# Patient Record
Sex: MALE | Race: WHITE | NOT HISPANIC OR LATINO | Employment: OTHER | URBAN - METROPOLITAN AREA
[De-identification: names, ages, dates, MRNs, and addresses within clinical notes are randomized per-mention and may not be internally consistent; named-entity substitution may affect disease eponyms.]

---

## 2017-07-03 ENCOUNTER — AMBULATORY - RIVER FALLS (OUTPATIENT)
Dept: FAMILY MEDICINE | Facility: CLINIC | Age: 67
End: 2017-07-03
Payer: COMMERCIAL

## 2017-07-04 LAB — CREAT SERPL-MCNC: 1.14 MG/DL (ref 0.7–1.25)

## 2017-09-27 ENCOUNTER — COMMUNICATION - HEALTHEAST (OUTPATIENT)
Dept: CARDIOLOGY | Facility: TELEHEALTH | Age: 67
End: 2017-09-27

## 2017-09-27 DIAGNOSIS — I10 ESSENTIAL HYPERTENSION WITH GOAL BLOOD PRESSURE LESS THAN 140/90: ICD-10-CM

## 2017-10-05 ENCOUNTER — AMBULATORY - RIVER FALLS (OUTPATIENT)
Dept: FAMILY MEDICINE | Facility: CLINIC | Age: 67
End: 2017-10-05
Payer: COMMERCIAL

## 2017-10-06 LAB
CREAT SERPL-MCNC: 1.18 MG/DL (ref 0.7–1.25)
GLUCOSE BLD-MCNC: 62 MG/DL (ref 65–99)
PSA SERPL-MCNC: 1.2 NG/ML

## 2017-10-11 ENCOUNTER — OFFICE VISIT - RIVER FALLS (OUTPATIENT)
Dept: FAMILY MEDICINE | Facility: CLINIC | Age: 67
End: 2017-10-11
Payer: COMMERCIAL

## 2017-10-11 ASSESSMENT — MIFFLIN-ST. JEOR: SCORE: 1440.76

## 2017-12-07 ENCOUNTER — OFFICE VISIT - RIVER FALLS (OUTPATIENT)
Dept: FAMILY MEDICINE | Facility: CLINIC | Age: 67
End: 2017-12-07
Payer: COMMERCIAL

## 2017-12-07 ASSESSMENT — MIFFLIN-ST. JEOR: SCORE: 1444.39

## 2017-12-14 ENCOUNTER — AMBULATORY - HEALTHEAST (OUTPATIENT)
Dept: CARDIOLOGY | Facility: CLINIC | Age: 67
End: 2017-12-14

## 2017-12-14 ENCOUNTER — RECORDS - HEALTHEAST (OUTPATIENT)
Dept: ADMINISTRATIVE | Facility: OTHER | Age: 67
End: 2017-12-14

## 2017-12-15 ENCOUNTER — OFFICE VISIT - HEALTHEAST (OUTPATIENT)
Dept: CARDIOLOGY | Facility: CLINIC | Age: 67
End: 2017-12-15

## 2017-12-15 DIAGNOSIS — E78.5 HYPERLIPIDEMIA: ICD-10-CM

## 2017-12-15 DIAGNOSIS — R07.2 PRECORDIAL PAIN: ICD-10-CM

## 2017-12-15 DIAGNOSIS — I10 ESSENTIAL HYPERTENSION: ICD-10-CM

## 2017-12-15 ASSESSMENT — MIFFLIN-ST. JEOR: SCORE: 1448.24

## 2017-12-18 ENCOUNTER — HOSPITAL ENCOUNTER (OUTPATIENT)
Dept: CARDIOLOGY | Facility: HOSPITAL | Age: 67
Discharge: HOME | End: 2017-12-18
Attending: INTERNAL MEDICINE

## 2017-12-18 DIAGNOSIS — R07.2 PRECORDIAL PAIN: ICD-10-CM

## 2017-12-18 DIAGNOSIS — E78.5 HYPERLIPIDEMIA: ICD-10-CM

## 2017-12-18 LAB
CV STRESS CURRENT BP HE: NORMAL
CV STRESS DEVIATION TIME HE: NORMAL
CV STRESS ECHO PERCENT HR HE: NORMAL
CV STRESS EXERCISE STAGE HE: NORMAL
CV STRESS FINAL RESTING BP HE: NORMAL
CV STRESS FINAL RESTING HR HE: 70
CV STRESS MAX HR HE: 133
CV STRESS MAX TREADMILL GRADE HE: 18
CV STRESS MAX TREADMILL SPEED HE: 5
CV STRESS PEAK DIA BP HE: NORMAL
CV STRESS PEAK SYS BP HE: NORMAL
CV STRESS PHASE HE: NORMAL
CV STRESS PROTOCOL HE: NORMAL
CV STRESS RESTING PT POSITION HE: NORMAL
CV STRESS RESTING PT POSITION HE: NORMAL
CV STRESS ST DEVIATION AMOUNT HE: NORMAL
CV STRESS ST DEVIATION ELEVATION HE: NORMAL
CV STRESS ST EVELATION AMOUNT HE: NORMAL
CV STRESS TEST TYPE HE: NORMAL
CV STRESS TOTAL STAGE TIME MIN 1 HE: NORMAL
ECHO EJECTION FRACTION ESTIMATED: 65 %
STRESS ECHO BASELINE BP: NORMAL
STRESS ECHO BASELINE HR: 54
STRESS ECHO CALCULATED PERCENT HR: 86 %
STRESS ECHO LAST STRESS BP: NORMAL
STRESS ECHO LAST STRESS HR: 133
STRESS ECHO POST ESTIMATED WORKLOAD: 12.9
STRESS ECHO POST EXERCISE DUR MIN: 12
STRESS ECHO POST EXERCISE DUR SEC: 40
STRESS ECHO TARGET HR: 132

## 2018-06-24 ENCOUNTER — COMMUNICATION - HEALTHEAST (OUTPATIENT)
Dept: CARDIOLOGY | Facility: TELEHEALTH | Age: 68
End: 2018-06-24

## 2018-06-24 DIAGNOSIS — I10 ESSENTIAL HYPERTENSION WITH GOAL BLOOD PRESSURE LESS THAN 140/90: ICD-10-CM

## 2018-06-27 ENCOUNTER — COMMUNICATION - HEALTHEAST (OUTPATIENT)
Dept: CARDIOLOGY | Facility: TELEHEALTH | Age: 68
End: 2018-06-27

## 2018-06-27 DIAGNOSIS — I10 ESSENTIAL HYPERTENSION WITH GOAL BLOOD PRESSURE LESS THAN 140/90: ICD-10-CM

## 2018-08-09 ENCOUNTER — OFFICE VISIT - RIVER FALLS (OUTPATIENT)
Dept: FAMILY MEDICINE | Facility: CLINIC | Age: 68
End: 2018-08-09
Payer: COMMERCIAL

## 2018-08-09 ENCOUNTER — COMMUNICATION - RIVER FALLS (OUTPATIENT)
Dept: FAMILY MEDICINE | Facility: CLINIC | Age: 68
End: 2018-08-09
Payer: COMMERCIAL

## 2018-08-09 ASSESSMENT — MIFFLIN-ST. JEOR: SCORE: 1415.36

## 2018-08-10 LAB
CREAT SERPL-MCNC: 1.13 MG/DL (ref 0.7–1.25)
GLUCOSE BLD-MCNC: 95 MG/DL (ref 65–99)
PSA SERPL-MCNC: 1.3 NG/ML

## 2018-08-21 ENCOUNTER — AMBULATORY - RIVER FALLS (OUTPATIENT)
Dept: FAMILY MEDICINE | Facility: CLINIC | Age: 68
End: 2018-08-21
Payer: COMMERCIAL

## 2018-08-31 ENCOUNTER — OFFICE VISIT - RIVER FALLS (OUTPATIENT)
Dept: FAMILY MEDICINE | Facility: CLINIC | Age: 68
End: 2018-08-31
Payer: COMMERCIAL

## 2018-08-31 ASSESSMENT — MIFFLIN-ST. JEOR: SCORE: 1415.36

## 2018-09-04 ENCOUNTER — AMBULATORY - RIVER FALLS (OUTPATIENT)
Dept: FAMILY MEDICINE | Facility: CLINIC | Age: 68
End: 2018-09-04
Payer: COMMERCIAL

## 2018-09-20 ENCOUNTER — OFFICE VISIT - RIVER FALLS (OUTPATIENT)
Dept: FAMILY MEDICINE | Facility: CLINIC | Age: 68
End: 2018-09-20
Payer: COMMERCIAL

## 2018-10-01 ENCOUNTER — OFFICE VISIT - RIVER FALLS (OUTPATIENT)
Dept: FAMILY MEDICINE | Facility: CLINIC | Age: 68
End: 2018-10-01
Payer: COMMERCIAL

## 2018-10-01 ASSESSMENT — MIFFLIN-ST. JEOR: SCORE: 1415.36

## 2018-10-10 ENCOUNTER — COMMUNICATION - HEALTHEAST (OUTPATIENT)
Dept: ADMINISTRATIVE | Facility: CLINIC | Age: 68
End: 2018-10-10

## 2018-11-02 ENCOUNTER — OFFICE VISIT - RIVER FALLS (OUTPATIENT)
Dept: FAMILY MEDICINE | Facility: CLINIC | Age: 68
End: 2018-11-02
Payer: COMMERCIAL

## 2018-11-02 ASSESSMENT — MIFFLIN-ST. JEOR: SCORE: 1415.36

## 2018-12-13 ENCOUNTER — OFFICE VISIT - RIVER FALLS (OUTPATIENT)
Dept: FAMILY MEDICINE | Facility: CLINIC | Age: 68
End: 2018-12-13
Payer: COMMERCIAL

## 2018-12-13 ASSESSMENT — MIFFLIN-ST. JEOR: SCORE: 1415.36

## 2018-12-16 ENCOUNTER — COMMUNICATION - HEALTHEAST (OUTPATIENT)
Dept: CARDIOLOGY | Facility: TELEHEALTH | Age: 68
End: 2018-12-16

## 2018-12-16 DIAGNOSIS — I10 ESSENTIAL HYPERTENSION WITH GOAL BLOOD PRESSURE LESS THAN 140/90: ICD-10-CM

## 2018-12-17 ENCOUNTER — OFFICE VISIT - RIVER FALLS (OUTPATIENT)
Dept: FAMILY MEDICINE | Facility: CLINIC | Age: 68
End: 2018-12-17
Payer: COMMERCIAL

## 2018-12-17 ASSESSMENT — MIFFLIN-ST. JEOR: SCORE: 1428.97

## 2018-12-27 ENCOUNTER — RECORDS - HEALTHEAST (OUTPATIENT)
Dept: ADMINISTRATIVE | Facility: OTHER | Age: 68
End: 2018-12-27

## 2018-12-27 ENCOUNTER — AMBULATORY - HEALTHEAST (OUTPATIENT)
Dept: CARDIOLOGY | Facility: CLINIC | Age: 68
End: 2018-12-27

## 2019-01-02 ENCOUNTER — OFFICE VISIT - HEALTHEAST (OUTPATIENT)
Dept: CARDIOLOGY | Facility: TELEHEALTH | Age: 69
End: 2019-01-02

## 2019-01-02 DIAGNOSIS — I10 ESSENTIAL HYPERTENSION: ICD-10-CM

## 2019-01-02 DIAGNOSIS — E78.5 HYPERLIPIDEMIA LDL GOAL <100: ICD-10-CM

## 2019-01-02 ASSESSMENT — MIFFLIN-ST. JEOR: SCORE: 1407.42

## 2019-02-20 ENCOUNTER — COMMUNICATION - HEALTHEAST (OUTPATIENT)
Dept: CARDIOLOGY | Facility: TELEHEALTH | Age: 69
End: 2019-02-20

## 2019-02-20 DIAGNOSIS — I10 ESSENTIAL HYPERTENSION WITH GOAL BLOOD PRESSURE LESS THAN 140/90: ICD-10-CM

## 2019-03-11 ENCOUNTER — COMMUNICATION - HEALTHEAST (OUTPATIENT)
Dept: CARDIOLOGY | Facility: TELEHEALTH | Age: 69
End: 2019-03-11

## 2019-03-11 DIAGNOSIS — I10 ESSENTIAL HYPERTENSION WITH GOAL BLOOD PRESSURE LESS THAN 140/90: ICD-10-CM

## 2019-06-17 ENCOUNTER — COMMUNICATION - HEALTHEAST (OUTPATIENT)
Dept: CARDIOLOGY | Facility: CLINIC | Age: 69
End: 2019-06-17

## 2019-06-17 DIAGNOSIS — R07.2 PRECORDIAL PAIN: ICD-10-CM

## 2019-06-18 ENCOUNTER — COMMUNICATION - HEALTHEAST (OUTPATIENT)
Dept: CARDIOLOGY | Facility: CLINIC | Age: 69
End: 2019-06-18

## 2019-06-18 DIAGNOSIS — R07.2 PRECORDIAL PAIN: ICD-10-CM

## 2019-06-28 ENCOUNTER — HOSPITAL ENCOUNTER (OUTPATIENT)
Dept: CARDIOLOGY | Facility: HOSPITAL | Age: 69
Discharge: HOME | End: 2019-06-28
Attending: INTERNAL MEDICINE

## 2019-06-28 DIAGNOSIS — R07.2 PRECORDIAL PAIN: ICD-10-CM

## 2019-06-28 LAB
CV STRESS CURRENT BP HE: NORMAL
CV STRESS CURRENT HR HE: 102
CV STRESS CURRENT HR HE: 109
CV STRESS CURRENT HR HE: 111
CV STRESS CURRENT HR HE: 114
CV STRESS CURRENT HR HE: 121
CV STRESS CURRENT HR HE: 132
CV STRESS CURRENT HR HE: 62
CV STRESS CURRENT HR HE: 74
CV STRESS CURRENT HR HE: 75
CV STRESS CURRENT HR HE: 76
CV STRESS CURRENT HR HE: 76
CV STRESS CURRENT HR HE: 77
CV STRESS CURRENT HR HE: 78
CV STRESS CURRENT HR HE: 79
CV STRESS CURRENT HR HE: 80
CV STRESS CURRENT HR HE: 83
CV STRESS CURRENT HR HE: 84
CV STRESS CURRENT HR HE: 86
CV STRESS CURRENT HR HE: 88
CV STRESS CURRENT HR HE: 92
CV STRESS CURRENT HR HE: 96
CV STRESS CURRENT HR HE: 98
CV STRESS DEVIATION TIME HE: NORMAL
CV STRESS ECHO PERCENT HR HE: NORMAL
CV STRESS EXERCISE STAGE HE: NORMAL
CV STRESS FINAL RESTING BP HE: NORMAL
CV STRESS FINAL RESTING HR HE: 74
CV STRESS MAX HR HE: 140
CV STRESS MAX TREADMILL GRADE HE: 18
CV STRESS MAX TREADMILL SPEED HE: 5
CV STRESS PEAK DIA BP HE: NORMAL
CV STRESS PEAK SYS BP HE: NORMAL
CV STRESS PHASE HE: NORMAL
CV STRESS PROTOCOL HE: NORMAL
CV STRESS RESTING PT POSITION HE: NORMAL
CV STRESS ST DEVIATION AMOUNT HE: NORMAL
CV STRESS ST DEVIATION ELEVATION HE: NORMAL
CV STRESS ST EVELATION AMOUNT HE: NORMAL
CV STRESS TEST TYPE HE: NORMAL
CV STRESS TOTAL STAGE TIME MIN 1 HE: NORMAL
ECHO EJECTION FRACTION ESTIMATED: 60 %
STRESS ECHO BASELINE BP: NORMAL
STRESS ECHO BASELINE HR: 61
STRESS ECHO CALCULATED PERCENT HR: 92 %
STRESS ECHO LAST STRESS BP: NORMAL
STRESS ECHO LAST STRESS HR: 132
STRESS ECHO POST ESTIMATED WORKLOAD: 13.5
STRESS ECHO POST EXERCISE DUR MIN: 13
STRESS ECHO POST EXERCISE DUR SEC: 0
STRESS ECHO TARGET HR: 130

## 2019-08-20 ENCOUNTER — AMBULATORY - RIVER FALLS (OUTPATIENT)
Dept: FAMILY MEDICINE | Facility: CLINIC | Age: 69
End: 2019-08-20
Payer: COMMERCIAL

## 2019-08-21 LAB
BUN SERPL-MCNC: 21 MG/DL (ref 7–25)
BUN/CREAT RATIO - HISTORICAL: 16 (ref 6–22)
CALCIUM SERPL-MCNC: 9.4 MG/DL (ref 8.6–10.3)
CHLORIDE BLD-SCNC: 104 MMOL/L (ref 98–110)
CHOLEST SERPL-MCNC: 147 MG/DL
CHOLEST/HDLC SERPL: 3.6 {RATIO}
CO2 SERPL-SCNC: 31 MMOL/L (ref 20–32)
CREAT SERPL-MCNC: 1.31 MG/DL (ref 0.7–1.25)
EGFRCR SERPLBLD CKD-EPI 2021: 56 ML/MIN/1.73M2
GLUCOSE BLD-MCNC: 86 MG/DL (ref 65–99)
HDLC SERPL-MCNC: 41 MG/DL
LDLC SERPL CALC-MCNC: 83 MG/DL
NONHDLC SERPL-MCNC: 106 MG/DL
POTASSIUM BLD-SCNC: 4.6 MMOL/L (ref 3.5–5.3)
PSA SERPL-MCNC: 1.5 NG/ML
SODIUM SERPL-SCNC: 141 MMOL/L (ref 135–146)
TRIGL SERPL-MCNC: 133 MG/DL
TSH SERPL DL<=0.005 MIU/L-ACNC: 0.61 MIU/L (ref 0.4–4.5)

## 2019-08-27 ENCOUNTER — OFFICE VISIT - RIVER FALLS (OUTPATIENT)
Dept: FAMILY MEDICINE | Facility: CLINIC | Age: 69
End: 2019-08-27
Payer: COMMERCIAL

## 2019-08-27 ASSESSMENT — MIFFLIN-ST. JEOR: SCORE: 1428.97

## 2019-09-03 ENCOUNTER — AMBULATORY - HEALTHEAST (OUTPATIENT)
Dept: CARDIOLOGY | Facility: CLINIC | Age: 69
End: 2019-09-03

## 2019-10-04 ENCOUNTER — AMBULATORY - RIVER FALLS (OUTPATIENT)
Dept: FAMILY MEDICINE | Facility: CLINIC | Age: 69
End: 2019-10-04
Payer: COMMERCIAL

## 2019-10-23 ENCOUNTER — OFFICE VISIT - RIVER FALLS (OUTPATIENT)
Dept: FAMILY MEDICINE | Facility: CLINIC | Age: 69
End: 2019-10-23
Payer: COMMERCIAL

## 2019-10-30 ENCOUNTER — COMMUNICATION - HEALTHEAST (OUTPATIENT)
Dept: CARDIOLOGY | Facility: TELEHEALTH | Age: 69
End: 2019-10-30

## 2019-10-30 ENCOUNTER — OFFICE VISIT - RIVER FALLS (OUTPATIENT)
Dept: FAMILY MEDICINE | Facility: CLINIC | Age: 69
End: 2019-10-30
Payer: COMMERCIAL

## 2020-05-27 ENCOUNTER — RECORDS - HEALTHEAST (OUTPATIENT)
Dept: ADMINISTRATIVE | Facility: OTHER | Age: 70
End: 2020-05-27

## 2020-05-27 ENCOUNTER — AMBULATORY - HEALTHEAST (OUTPATIENT)
Dept: CARDIOLOGY | Facility: CLINIC | Age: 70
End: 2020-05-27

## 2020-05-29 ENCOUNTER — OFFICE VISIT - HEALTHEAST (OUTPATIENT)
Dept: CARDIOLOGY | Facility: CLINIC | Age: 70
End: 2020-05-29

## 2020-05-29 DIAGNOSIS — R07.89 ATYPICAL CHEST PAIN: ICD-10-CM

## 2020-05-29 DIAGNOSIS — E78.5 HYPERLIPIDEMIA LDL GOAL <100: ICD-10-CM

## 2020-05-29 DIAGNOSIS — I10 ESSENTIAL HYPERTENSION: ICD-10-CM

## 2020-08-25 ENCOUNTER — COMMUNICATION - RIVER FALLS (OUTPATIENT)
Dept: FAMILY MEDICINE | Facility: CLINIC | Age: 70
End: 2020-08-25
Payer: COMMERCIAL

## 2020-08-26 ENCOUNTER — AMBULATORY - RIVER FALLS (OUTPATIENT)
Dept: FAMILY MEDICINE | Facility: CLINIC | Age: 70
End: 2020-08-26
Payer: COMMERCIAL

## 2020-08-27 ENCOUNTER — COMMUNICATION - RIVER FALLS (OUTPATIENT)
Dept: FAMILY MEDICINE | Facility: CLINIC | Age: 70
End: 2020-08-27
Payer: COMMERCIAL

## 2020-08-27 LAB
BUN SERPL-MCNC: 21 MG/DL (ref 7–25)
BUN/CREAT RATIO - HISTORICAL: NORMAL (ref 6–22)
CALCIUM SERPL-MCNC: 9.7 MG/DL (ref 8.6–10.3)
CHLORIDE BLD-SCNC: 102 MMOL/L (ref 98–110)
CHOLEST SERPL-MCNC: 164 MG/DL
CHOLEST/HDLC SERPL: 3 {RATIO}
CO2 SERPL-SCNC: 30 MMOL/L (ref 20–32)
CREAT SERPL-MCNC: 1.21 MG/DL (ref 0.7–1.25)
EGFRCR SERPLBLD CKD-EPI 2021: 61 ML/MIN/1.73M2
GLUCOSE BLD-MCNC: 87 MG/DL (ref 65–99)
HDLC SERPL-MCNC: 55 MG/DL
LDLC SERPL CALC-MCNC: 93 MG/DL
NONHDLC SERPL-MCNC: 109 MG/DL
POTASSIUM BLD-SCNC: 4.9 MMOL/L (ref 3.5–5.3)
PSA SERPL-MCNC: 1.3 NG/ML
SODIUM SERPL-SCNC: 138 MMOL/L (ref 135–146)
TRIGL SERPL-MCNC: 69 MG/DL
TSH SERPL DL<=0.005 MIU/L-ACNC: 9.35 MIU/L (ref 0.4–4.5)

## 2020-09-09 ENCOUNTER — OFFICE VISIT - RIVER FALLS (OUTPATIENT)
Dept: FAMILY MEDICINE | Facility: CLINIC | Age: 70
End: 2020-09-09
Payer: COMMERCIAL

## 2020-09-09 ENCOUNTER — TRANSFERRED RECORDS (OUTPATIENT)
Dept: MULTI SPECIALTY CLINIC | Facility: CLINIC | Age: 70
End: 2020-09-09
Payer: COMMERCIAL

## 2020-09-09 ASSESSMENT — MIFFLIN-ST. JEOR: SCORE: 1428.97

## 2020-11-25 ENCOUNTER — AMBULATORY - RIVER FALLS (OUTPATIENT)
Dept: FAMILY MEDICINE | Facility: CLINIC | Age: 70
End: 2020-11-25
Payer: COMMERCIAL

## 2020-11-26 LAB — TSH SERPL DL<=0.005 MIU/L-ACNC: 2.28 MIU/L (ref 0.4–4.5)

## 2020-12-01 ENCOUNTER — COMMUNICATION - RIVER FALLS (OUTPATIENT)
Dept: FAMILY MEDICINE | Facility: CLINIC | Age: 70
End: 2020-12-01
Payer: COMMERCIAL

## 2021-03-16 ENCOUNTER — COMMUNICATION - RIVER FALLS (OUTPATIENT)
Dept: FAMILY MEDICINE | Facility: CLINIC | Age: 71
End: 2021-03-16
Payer: COMMERCIAL

## 2021-05-29 NOTE — TELEPHONE ENCOUNTER
----- Message -----  From: Tramaine Drummond MD  Sent: 6/17/2019   8:46 PM  To: Ximena Lynch RN    Stress echo would be good to evaluate his chest pain.    ThanksTramaine

## 2021-05-29 NOTE — TELEPHONE ENCOUNTER
Patient called back to review recommendations per Dr. Drummond. Patient agreeable to proceed with stress echo-- order placed and patient was warm transferred to scheduling to arrange testing. -duglas

## 2021-05-29 NOTE — TELEPHONE ENCOUNTER
Return call to patient. Patient advised he would receive call from nurse prior to test to review prep. He verbalized understanding and can call me directly with any other questions or concerns. -duglas

## 2021-05-29 NOTE — TELEPHONE ENCOUNTER
"Return call to patient to discuss symptoms. Patient reports that he had an episode of left sided chest pain that lasted about 10-15 minutes. He states the pain started when he was driving and was located on the left side of his chest just below his nipple line. Patient notes that the pain was sharp in nature. He denies any shortness of breath, lightheadedness, diaphoresis or radiation of pain during this episode. He has since done activities requiring exertion without recurrence of pain.     Reviewed with patient reasons to seek emergent evaluation. Patient verbalized understanding. Patient also wonders states it has been \"awhile\" since last Stress Echo and wonders if it would be worth repeating. Dr. Drummond, please review patient updated and advise. -ejb  "

## 2021-05-29 NOTE — TELEPHONE ENCOUNTER
----- Message from Stephanie Paredes sent at 6/17/2019  9:45 AM CDT -----  Contact: patient  General phone call:    Caller: Patient  Primary cardiologist: Dr. Drummond  Detailed reason for call: Patient experienced a sharp pain yesterday that lasted 10-15 minutes it was 2 inches  Below the heart on the left side.  Since then he has been feeling like he got punched(dull pain).  He is leaving Wednesday for a 4 day vacation.  New or active symptoms? sharp pain yesterday that lasted 10-15 minutes it was 2 inches  Below the heart on the left side.  Since then he has been feeling like he got punched(dull pain).    Best phone number: [phone]  Best time to contact: Anytime  Ok to leave a detailed message? Yes  Device? no    Additional Info:

## 2021-05-29 NOTE — TELEPHONE ENCOUNTER
----- Message from Cinthia Barrett sent at 6/18/2019  9:52 AM CDT -----  Contact: Patient  General phone call:    Caller: Patient  Primary cardiologist: Dr. Drummond  Detailed reason for call: Pt sched for stress Echo 6/28- has ques re taking meds before test  New or active symptoms?   Best phone number: [phone]  Best time to contact:   Ok to leave a detailed message?   Device?     Additional Info:

## 2021-05-31 VITALS — WEIGHT: 158 LBS | HEIGHT: 68 IN | BODY MASS INDEX: 23.95 KG/M2

## 2021-06-02 VITALS — BODY MASS INDEX: 22.58 KG/M2 | HEIGHT: 68 IN | WEIGHT: 149 LBS

## 2021-06-02 NOTE — TELEPHONE ENCOUNTER
----- Message -----  From: Tramaine Drummond MD  Sent: 10/31/2019  12:46 PM CDT  To: Ximena Lynch RN    Thank you.

## 2021-06-02 NOTE — TELEPHONE ENCOUNTER
Patient calling to report PCP, Dr. Cortez, reduced his Metoprolol from 50 mg daily to 25 mg daily to due bradycardia. Patient will monitor heart rate and blood pressure and call with further concerns.     Dr. Drummond, patient wanted to make sure you were aware and in agreement with this change. -ejb

## 2021-06-04 VITALS — WEIGHT: 152 LBS | BODY MASS INDEX: 23.46 KG/M2

## 2021-06-08 NOTE — PROGRESS NOTES
Review of Systems - History obtained from the patient  General ROS: negative  Psychological ROS: negative  Ophthalmic ROS: negative  ENT ROS: negative  Allergy and Immunology ROS: negative  Hematological and Lymphatic ROS: negative  Endocrine ROS: negative  Respiratory ROS: negative  Cardiovascular ROS: negative  Gastrointestinal ROS: negative  Genito-Urinary ROS: negative  Musculoskeletal ROS: negative  Neurological ROS: positive for - dizziness  Dermatological ROS: negative

## 2021-06-25 NOTE — PROGRESS NOTES
Progress Notes by Tramaine Drummond MD at 12/15/2017  9:10 AM     Author: Tramaine Drummond MD Service: -- Author Type: Physician    Filed: 12/15/2017  9:52 AM Encounter Date: 12/15/2017 Status: Signed    : Tramaine Drummond MD (Physician)           Click to link to [facility] HEART CARE NOTE    Thank you, Dr. Cortez, for asking us to see Jarrod Mancera at the [facility].      Assessment/Recommendations   Patient with known risk factors for coronary artery disease.  His blood pressures been well treated, his hyperlipidemia is well treated as well.  He takes an antiplatelet agent.    He does have some discomfort which I think is atypical for angina but would recommend a stress echocardiogram as it is been a couple of years.  He is agreeable to this and will try to arrange that in the near future.    I have encouraged him to continue his active lifestyle and regular exercise and to call if there is change in his functional capacity.  If he remains stable, we will see him back in 1 year.    Thank you for allowing us to participate in his care.         History of Present Illness    Mr. Jarrod Mancera is a 67 y.o. male with hypertension as well as a history of hyperlipidemia.  He has a fairly dramatic family history of premature coronary artery disease in his sister who had bypass surgery recently had to have some stents placed.  He has been feeling well.  He exercises for 30 minutes almost every day.  He alternates with weightlifting and walking on the treadmill.  He has had some discomfort in his left chest.  It is in a line just to the left of the sternum and occurs typically when he is in bed.  It will be mild to moderate in intensity and last about 15 minutes and then go away.  The discomfort is not associated with shortness of breath or diaphoresis.  It does not radiate to his neck or arm.  It is mild to moderate in intensity.    He denies orthopnea, paroxysmal  nocturnal dyspnea, peripheral edema, syncope or near syncopal episodes.  He has not had palpitations.  Recent lipid panel drawn at his work showed a total cholesterol of 164, LDL of 69, HDL of 51 and triglycerides of 171.  His blood sugar was normal.    He is thinking about retiring and likely will retire in March 2018.         Physical Examination Review of Systems   Vitals:    12/15/17 0913   BP: 130/76   Pulse: (!) 54   Resp: 16     Body mass index is 24.38 kg/(m^2).  Wt Readings from Last 3 Encounters:   12/15/17 158 lb (71.7 kg)   09/21/16 153 lb (69.4 kg)   08/05/15 152 lb (68.9 kg)     General Appearance:   Alert, cooperative and in no acute distress.   ENT/Mouth: Oral mucuos membranes pink and moist .      EYES:  No scleral icterus. No Xanthelasma.    Neck: JVP normal. No Hepatojugular reflux. Thyroid not visualized   Chest/Lungs:   Lungs are clear to auscultation, equal chest wall expansion    Cardiovascular:   S1, S2 without murmur ,clicks or rubs. Brachial, radial and posterior tibial pulses are intact and symetric. No carotid bruits noted   Abdomen:  Nontender. BS+. No bruits.      Extremities: No cyanosis, clubbing or edema   Skin: no xanthelasma, warm.    Psych: Appropriate affect.   Neurologic: normal gait, normal  bilateral, no tremors        General: WNL  Eyes: WNL  Ears/Nose/Throat: WNL  Lungs: WNL  Heart: Chest Pain  Stomach: WNL  Bladder: WNL  Muscle/Joints: WNL  Skin: WNL  Nervous System: WNL  Mental Health: WNL     Blood: WNL     Medical History  Surgical History Family History Social History   No past medical history on file. No past surgical history on file. Family History   Problem Relation Age of Onset   ? Acute Myocardial Infarction Mother    ? Acute Myocardial Infarction Father    ? CABG Sister     Social History     Social History   ? Marital status:      Spouse name: N/A   ? Number of children: N/A   ? Years of education: N/A     Occupational History   ? Not on file.      Social History Main Topics   ? Smoking status: Former Smoker     Quit date: 8/5/1977   ? Smokeless tobacco: Not on file   ? Alcohol use Not on file   ? Drug use: Not on file   ? Sexual activity: Not on file     Other Topics Concern   ? Not on file     Social History Narrative          Medications  Allergies   Current Outpatient Prescriptions   Medication Sig Dispense Refill   ? aspirin 325 MG EC tablet Take 325 mg by mouth daily.     ? atorvastatin (LIPITOR) 80 MG tablet Take 80 mg by mouth bedtime.     ? baclofen (LIORESAL) 20 MG tablet Take 20 mg by mouth 3 (three) times a day.      ? cholecalciferol, vitamin D3, (VITAMIN D3) 2,000 unit Tab Take 2 tablets by mouth daily. 3,000 units once daily.     ? citalopram (CELEXA) 40 MG tablet Take 20 mg by mouth daily.      ? gabapentin (NEURONTIN) 300 MG capsule Take 300 mg by mouth 3 (three) times a day.     ? glatiramer (COPAXONE) 40 mg/mL Syrg injection 3 times weekly.     ? levothyroxine (SYNTHROID, LEVOTHROID) 125 MCG tablet Take 125 mcg by mouth daily.     ? losartan (COZAAR) 50 MG tablet TAKE 1 1/2 TABLETS (75 MG) BY MOUTH DAILY 135 tablet 2   ? multivitamin (MULTIVITAMIN) per tablet Take 1 tablet by mouth daily.     ? nitroglycerin (NITROSTAT) 0.4 MG SL tablet Place 0.4 mg under the tongue every 5 (five) minutes as needed for chest pain.     ? COPAXONE 20 mg/mL Syrg injection 40 mg. 3 times a week.       No current facility-administered medications for this visit.       Allergies   Allergen Reactions   ? Codeine          Lab Results    Chemistry/lipid CBC Cardiac Enzymes/BNP/TSH/INR   No results found for: CHOL, HDL, LDLCALC, TRIG, CREATININE, BUN, K, NA, CL, CO2 No results found for: WBC, HGB, HCT, MCV, PLT No results found for: CKTOTAL, CKMB, CKMBINDEX, TROPONINI, BNP, TSH, INR

## 2021-06-27 NOTE — PROGRESS NOTES
"Progress Notes by Tramaine Drummond MD at 1/2/2019  3:10 PM     Author: Tramaine Drummond MD Service: -- Author Type: Physician    Filed: 1/2/2019  3:40 PM Encounter Date: 1/2/2019 Status: Signed    : Tramaine Drummond MD (Physician)           Click to link to [facility] HEART CARE NOTE    Thank you, Dr. Cortez, for asking us to see Jarrod Mancera at the [facility].      Assessment/Recommendations   Patient with known risk factors for coronary artery disease but no symptoms and a normal stress echocardiogram 1 year ago.  He takes an excellent dose of our atorvastatin.  I would repeat his lipid panel in the next few months and he will check with his primary care physician in this regard.    His blood pressure is at goal but he will continue to check at home and  some readings to ensure that it is not rising.  If he did have rising blood pressures I would have a low threshold to increase his anti-hypertensive regimen.    He will continue to exercise on a regular basis and call us if he has any changes in his functional capacity or new symptomatology in particular shortness of breath, chest jaw or arm discomfort.    Thank you for allowing us to participate in his care.         History of Present Illness    Mr. Jarrod Mancera is a 68 y.o. male with known risk factors for coronary artery disease including positive family history, hyperlipidemia, and hypertension.  He had a stress echocardiogram about 1 year ago which was normal.  He takes our atorvastatin 80 mg and his last LDL cholesterol was in the 70s in late 2017.  His blood pressure at home tends to run in the normal range.  He continues to exercise on a daily basis.  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  Her graph he denies orthopnea, paroxysmal nocturnal dyspnea, peripheral edema, syncope or near syncopal " episodes.  He denies palpitations or chest discomfort.         Physical Examination Review of Systems   Vitals:    01/02/19 1535   BP: 132/82   Pulse:    Resp:      Body mass index is 22.99 kg/m .  Wt Readings from Last 3 Encounters:   01/02/19 149 lb (67.6 kg)   12/15/17 158 lb (71.7 kg)   09/21/16 153 lb (69.4 kg)     General Appearance:   Alert, cooperative and in no acute distress.   ENT/Mouth: Oral mucuos membranes pink and moist .      EYES:  No scleral icterus. No Xanthelasma.    Neck: JVP normal. No Hepatojugular reflux. Thyroid not visualized   Chest/Lungs:   Lungs are clear to auscultation, equal chest wall expansion    Cardiovascular:   S1, S2 without murmur ,clicks or rubs. Brachial, radial and posterior tibial pulses are intact and symetric. No carotid bruits noted   Abdomen:  Nontender. BS+.       Extremities: No cyanosis, clubbing or edema   Skin: no xanthelasma, warm.    Psych: Appropriate affect.   Neurologic: normal gait, normal  bilateral, no tremors        General: WNL  Eyes: WNL  Ears/Nose/Throat: WNL  Lungs: WNL  Heart: WNL  Stomach: WNL  Bladder: WNL  Muscle/Joints: WNL  Skin: WNL  Nervous System: WNL  Mental Health: WNL     Blood: WNL     Medical History  Surgical History Family History Social History   Past Medical History:   Diagnosis Date   ? Family history of myocardial infarction    ? High cholesterol    ? Hypertension    ? Multiple sclerosis (H)     No past surgical history on file. Family History   Problem Relation Age of Onset   ? Acute Myocardial Infarction Mother    ? Acute Myocardial Infarction Father    ? CABG Sister     Social History     Socioeconomic History   ? Marital status:      Spouse name: Not on file   ? Number of children: Not on file   ? Years of education: Not on file   ? Highest education level: Not on file   Social Needs   ? Financial resource strain: Not on file   ? Food insecurity - worry: Not on file   ? Food insecurity - inability: Not on file   ?  Transportation needs - medical: Not on file   ? Transportation needs - non-medical: Not on file   Occupational History   ? Not on file   Tobacco Use   ? Smoking status: Former Smoker     Last attempt to quit: 1977     Years since quittin.4   ? Smokeless tobacco: Never Used   Substance and Sexual Activity   ? Alcohol use: Not on file   ? Drug use: Not on file   ? Sexual activity: Not on file   Other Topics Concern   ? Not on file   Social History Narrative   ? Not on file          Medications  Allergies   Current Outpatient Medications   Medication Sig Dispense Refill   ? aspirin 325 MG EC tablet Take 325 mg by mouth daily.     ? atorvastatin (LIPITOR) 80 MG tablet Take 80 mg by mouth bedtime.     ? baclofen (LIORESAL) 20 MG tablet Take 20 mg by mouth 3 (three) times a day.      ? cholecalciferol, vitamin D3, (VITAMIN D3) 2,000 unit Tab Take 2 tablets by mouth daily. 3,000 units once daily.     ? COPAXONE 20 mg/mL Syrg injection 40 mg. 3 times a week.     ? gabapentin (NEURONTIN) 300 MG capsule Take 300 mg by mouth 3 (three) times a day.     ? glatiramer (COPAXONE) 40 mg/mL Syrg injection 3 times weekly.     ? levothyroxine (SYNTHROID, LEVOTHROID) 125 MCG tablet Take 125 mcg by mouth daily.     ? losartan (COZAAR) 50 MG tablet TAKE ONE AND ONE-HALF TABLETS [75MG] BY MOUTH EVERY  tablet 0   ? multivitamin (MULTIVITAMIN) per tablet Take 1 tablet by mouth daily.     ? nitroglycerin (NITROSTAT) 0.4 MG SL tablet Place 0.4 mg under the tongue every 5 (five) minutes as needed for chest pain.       No current facility-administered medications for this visit.       Allergies   Allergen Reactions   ? Codeine          Lab Results    Chemistry/lipid CBC Cardiac Enzymes/BNP/TSH/INR   No results found for: CHOL, HDL, LDLCALC, TRIG, CREATININE, BUN, K, NA, CL, CO2 No results found for: WBC, HGB, HCT, MCV, PLT No results found for: CKTOTAL, CKMB, CKMBINDEX, TROPONINI, BNP, TSH, INR

## 2021-06-29 NOTE — PROGRESS NOTES
"Progress Notes by Tramaine Drummond MD at 5/29/2020  1:10 PM     Author: Tramaine Drummond MD Service: -- Author Type: Physician    Filed: 5/29/2020  1:34 PM Encounter Date: 5/29/2020 Status: Signed    : Tramaine Drummond MD (Physician)           The patient has been notified of following:     \"This video visit will be conducted via a call between you and your physician/provider. We have found that certain health care needs can be provided without the need for an in-person physical exam.  This service lets us provide the care you need with a video conversation.  If a prescription is necessary we can send it directly to your pharmacy.  If lab work is needed we can place an order for that and you can then stop by our lab to have the test done at a later time.      Patient has given verbal consent to a Video visit? Yes    HEART CARE VIDEO ENCOUNTER        The patient has chosen to have the visit conducted as a video visit, to reduce risk of exposure given the current status of Coronavirus in our community. This video visit is being conducted via a call between the patient and physician/provider. Health care needs are being provided without a physical exam.     Assessment/Recommendations   Assessment/Plan:  Patient with known risk factors for coronary artery disease and we have been aggressive at modifying his risk factors.  His most recent LDL cholesterol was 83, blood pressures have been at goal, and he exercises for an hour almost every day.  He has no new symptomatology.    I encouraged him to continue his exercise which I know he will do and to call if he has any new symptoms.    His chest discomfort that comes only at rest and last 1 minute is not angina and I reassured him in this regard.    Thank you for allowing us to participate in his care.      I have reviewed the note as documented.  This accurately captures the substance of my conversation with the patient.    Total time of video between patient " and provider was 12 minutes   Start Time: 1:15 PM  Stop Time: 1:27 PM    Originating Location (pt. Location):  Home    Distant Location (provider location):  [facility]    Mode of Communication:  Video Conference via [email or web address]       History of Present Illness/Subjective    Jarrod Mancera is a 70 y.o. male who is being evaluated via a billable video visit and has consented to a video visit. Jarrod Mancera has a history of risk factors for coronary artery disease and we have been aggressive at modifying his risk factors.  He had a lipid panel last fall which showed an LDL of 83.  His blood pressures at home in the morning run in the 120 systolic range.  Because of some reduced reduction in his heart rate his primary care physician reduced his metoprolol.  His heart rate still run 48-50 at rest.  When he did a stress test last year he was able to get his heart rate up to 140 and he took his beta-blocker that morning.  He has not had any chest discomfort he exercises every day.  He has not had chest discomfort with exercise that is but he does get a sharp type pain once in a while when he lies down which is on the left side of his chest and does not radiate.  It lasts about 1 minute and then goes away on its own.  It is not associated with shortness of breath nausea or vomiting and it does not radiate.  This pain does not come on when he is physically active and he does not have any other kinds of pain when he is physically active.  He denies orthopnea, paroxysmal nocturnal dyspnea and has a little bit of peripheral edema in his left lower extremity.  He had a fracture in that extremity.      I have reviewed and updated the patient's Past Medical History, Social History, Family History and Medication List.     Physical Examination performed via live video encounter Review of Systems   General Appearance:   no distress, normal body habitus, upright.   ENT/Mouth: membranes moist, no nasal discharge or bleeding  gums.  Normal head shape, no evidence of injury or laceration.     EYES:  no scleral icterus, normal conjunctivae   Neck: no evidence of thyromegaly.  Supple   Chest/Lungs:   No audible wheezing equal chest wall expansion. Non labored breathing.  No cough.   Cardiovascular:   No evidence of elevated jugular venous pressure.  No evidence of pitting edema bilaterally.  There is a little puffiness noted when visualizing his left ankle.   Abdomen:  no evidence of abdominal distention. No observe juandice.     Extremities: no cyanosis or clubbing noted.    Skin: no xanthelasma, normal skin color. No evidence of facial lacerations.      Neurologic: Normal arm motion bilateral, no tremors.  No evidence of focal defect.       Psychiatric: alert and oriented , calm                                               Medical History  Surgical History Family History Social History   Past Medical History:   Diagnosis Date   ? Family history of myocardial infarction    ? High cholesterol    ? Hypertension    ? Multiple sclerosis (H)     No past surgical history on file. Family History   Problem Relation Age of Onset   ? Acute Myocardial Infarction Mother    ? Acute Myocardial Infarction Father    ? CABG Sister       Social History     Socioeconomic History   ? Marital status:      Spouse name: Not on file   ? Number of children: Not on file   ? Years of education: Not on file   ? Highest education level: Not on file   Occupational History   ? Not on file   Social Needs   ? Financial resource strain: Not on file   ? Food insecurity     Worry: Not on file     Inability: Not on file   ? Transportation needs     Medical: Not on file     Non-medical: Not on file   Tobacco Use   ? Smoking status: Former Smoker     Last attempt to quit: 1977     Years since quittin.8   ? Smokeless tobacco: Never Used   Substance and Sexual Activity   ? Alcohol use: Not on file   ? Drug use: Not on file   ? Sexual activity: Not on file    Lifestyle   ? Physical activity     Days per week: Not on file     Minutes per session: Not on file   ? Stress: Not on file   Relationships   ? Social connections     Talks on phone: Not on file     Gets together: Not on file     Attends Synagogue service: Not on file     Active member of club or organization: Not on file     Attends meetings of clubs or organizations: Not on file     Relationship status: Not on file   ? Intimate partner violence     Fear of current or ex partner: Not on file     Emotionally abused: Not on file     Physically abused: Not on file     Forced sexual activity: Not on file   Other Topics Concern   ? Not on file   Social History Narrative   ? Not on file          Medications  Allergies   Current Outpatient Medications   Medication Sig Dispense Refill   ? aspirin 325 MG EC tablet Take 325 mg by mouth daily.     ? atorvastatin (LIPITOR) 80 MG tablet Take 80 mg by mouth bedtime.     ? baclofen (LIORESAL) 20 MG tablet Take 20 mg by mouth 3 (three) times a day.      ? cholecalciferol, vitamin D3, (VITAMIN D3) 2,000 unit Tab Take 1 tablet by mouth daily. 3,000 units once daily.     ? citalopram (CELEXA) 40 MG tablet Take 40 mg by mouth daily.     ? COPAXONE 20 mg/mL Syrg injection 40 mg. 3 times a week.     ? gabapentin (NEURONTIN) 300 MG capsule Take 300 mg by mouth 3 (three) times a day.     ? glatiramer (COPAXONE) 40 mg/mL Syrg injection 3 times weekly.     ? levothyroxine (SYNTHROID, LEVOTHROID) 125 MCG tablet Take 125 mcg by mouth daily.     ? losartan (COZAAR) 50 MG tablet TAKE ONE AND ONE-HALF TABLETS BY MOUTH EVERY  tablet 1   ? losartan (COZAAR) 50 MG tablet TAKE ONE AND ONE-HALF TABLETS BY MOUTH EVERY  tablet 1   ? metoprolol tartrate (LOPRESSOR) 25 MG tablet Take 25 mg by mouth daily.     ? multivitamin (MULTIVITAMIN) per tablet Take 1 tablet by mouth daily.       No current facility-administered medications for this visit.     Allergies   Allergen Reactions   ?  Codeine          Lab Results    Chemistry/lipid CBC Cardiac Enzymes/BNP/TSH/INR   No results found for: CHOL, HDL, LDLCALC, TRIG, CREATININE, BUN, K, NA, CL, CO2 No results found for: WBC, HGB, HCT, MCV, PLT No results found for: CKTOTAL, CKMB, CKMBINDEX, TROPONINI, BNP, TSH, INR     Tramaine Drummond

## 2021-08-25 ENCOUNTER — COMMUNICATION - RIVER FALLS (OUTPATIENT)
Dept: FAMILY MEDICINE | Facility: CLINIC | Age: 71
End: 2021-08-25
Payer: COMMERCIAL

## 2021-08-27 ENCOUNTER — OFFICE VISIT (OUTPATIENT)
Dept: CARDIOLOGY | Facility: CLINIC | Age: 71
End: 2021-08-27
Payer: COMMERCIAL

## 2021-08-27 VITALS
DIASTOLIC BLOOD PRESSURE: 96 MMHG | RESPIRATION RATE: 16 BRPM | WEIGHT: 152.7 LBS | HEART RATE: 64 BPM | BODY MASS INDEX: 23.56 KG/M2 | SYSTOLIC BLOOD PRESSURE: 150 MMHG

## 2021-08-27 DIAGNOSIS — E78.00 PURE HYPERCHOLESTEROLEMIA: Primary | ICD-10-CM

## 2021-08-27 DIAGNOSIS — I20.9 ANGINA PECTORIS, UNSPECIFIED (H): ICD-10-CM

## 2021-08-27 DIAGNOSIS — G35 MULTIPLE SCLEROSIS (H): ICD-10-CM

## 2021-08-27 DIAGNOSIS — R07.2 PRECORDIAL PAIN: ICD-10-CM

## 2021-08-27 PROBLEM — R07.9 CHEST PAIN: Status: ACTIVE | Noted: 2021-08-27

## 2021-08-27 PROCEDURE — 93000 ELECTROCARDIOGRAM COMPLETE: CPT | Performed by: INTERNAL MEDICINE

## 2021-08-27 PROCEDURE — 99214 OFFICE O/P EST MOD 30 MIN: CPT | Performed by: INTERNAL MEDICINE

## 2021-08-27 RX ORDER — BACLOFEN 10 MG/1
1 TABLET ORAL 3 TIMES DAILY
COMMUNITY
Start: 2021-03-06 | End: 2024-01-19

## 2021-08-27 RX ORDER — GABAPENTIN 300 MG/1
2 CAPSULE ORAL 3 TIMES DAILY
COMMUNITY
Start: 2021-03-06 | End: 2022-09-28

## 2021-08-27 NOTE — PROGRESS NOTES
Assessment/Recommendations   Patient with multiple sclerosis in her recent flare but also has known significant elevation in his calcium score.  He has a newer type of chest discomfort which have some typical features and some atypical features.  I think it would be important to clarify if he has significant epicardial coronary artery disease and has had multiple stress test in the past so I am recommending a CT angiogram.  We will obtain a basic metabolic panel early next week and hopefully can accomplish the CT angiogram next week.    He is on a good dose of statin.  His blood pressure is up a bit today and if his basic metabolic panel on Monday is unremarkable, we will go up on his H2 receptor blocker or add low-dose beta-blocker.    He did have a soft carotid bruit and after his CT scan I think we will also arrange for a carotid ultrasound.    Thank you for allowing us to participate in his care.    35 minutes spent in chart review, patient visit, and documentation.       History of Present Illness/Subjective    Mr. Jarrod Mancera is a 71 year old male with known family history of premature coronary artery disease as well as elevated calcium score.  We have been treating his lipids and blood pressure over the last several years.  He has had chest discomforts of varying types over the years and has had several stress echocardiograms which have been unremarkable.  He came up with a flare of his multiple sclerosis recently which he described as his right foot feeling heavy and numb although he can use it.  He also had a numbness across his shoulders and a feeling in his chest some numbness which is sometimes described as a tightness.  He continues to workout going 30 minutes on a stationary bike and this has no impact on the discomfort.  The discomforts and numbness are constant nature.  His breathing has been stable and he denies orthopnea, paroxysmal nocturnal dyspnea, peripheral edema, syncope or near  syncopal episodes.    ECG: Personally reviewed. {Sinus rhythm with right bundle branch block pattern.  No acute ST-T wave changes noted.       Physical Examination Review of Systems   BP (!) 150/96 (BP Location: Right arm, Patient Position: Sitting, Cuff Size: Adult Regular)   Pulse 64   Resp 16   Wt 69.3 kg (152 lb 11.2 oz)   BMI 23.56 kg/m    Body mass index is 23.56 kg/m .  Wt Readings from Last 3 Encounters:   08/27/21 69.3 kg (152 lb 11.2 oz)   05/29/20 68.9 kg (152 lb)   01/02/19 67.6 kg (149 lb)     General Appearance:   Alert, cooperative and in no acute distress.   ENT/Mouth: Patient wearing a mask.      EYES:  no scleral icterus, normal conjunctivae   Neck: JVP normal. No Hepatojugular reflux. Thyroid not visualized.   Chest/Lungs:   Lungs are clear to auscultation, equal chest wall expansion.   Cardiovascular:   S1, S2 without murmur ,clicks or rubs. Brachial, radial and posterior tibial pulses are intact and symetric.  Soft right carotid bruits noted.   Abdomen:  Nontender. BS+.    Extremities: No cyanosis, clubbing or edema   Skin: no xanthelasma, warm.    Neurologic: normal arm movement bilateral, no tremors     Psychiatric: Appropriate affect.      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;     Family history of myocardial infarction      High cholesterol      Hypertension      Hypertension      MS (multiple sclerosis) (H)      Multiple sclerosis (H)     Past Surgical History:   Procedure Laterality Date     ARTHROSCOPY SHOULDER  1990    L Shoulder     HEAD & NECK SURGERY      Robotic Surgery @ [place] 2011     HERNIA REPAIR  2006     KNEE SURGERY  1970    Family History   Problem Relation Age of Onset     Cancer No family hx of         no skin cancer     Acute  Myocardial Infarction Mother      Acute Myocardial Infarction Father      CABG Sister     Social History     Socioeconomic History     Marital status:      Spouse name: Not on file     Number of children: Not on file     Years of education: Not on file     Highest education level: Not on file   Occupational History     Not on file   Tobacco Use     Smoking status: Former Smoker     Quit date: 1977     Years since quittin.0     Smokeless tobacco: Never Used   Substance and Sexual Activity     Alcohol use: Not on file     Drug use: Not on file     Sexual activity: Not on file   Other Topics Concern     Parent/sibling w/ CABG, MI or angioplasty before 65F 55M? Not Asked   Social History Narrative     Not on file     Social Determinants of Health     Financial Resource Strain:      Difficulty of Paying Living Expenses:    Food Insecurity:      Worried About Running Out of Food in the Last Year:      Ran Out of Food in the Last Year:    Transportation Needs:      Lack of Transportation (Medical):      Lack of Transportation (Non-Medical):    Physical Activity:      Days of Exercise per Week:      Minutes of Exercise per Session:    Stress:      Feeling of Stress :    Social Connections:      Frequency of Communication with Friends and Family:      Frequency of Social Gatherings with Friends and Family:      Attends Church Services:      Active Member of Clubs or Organizations:      Attends Club or Organization Meetings:      Marital Status:    Intimate Partner Violence:      Fear of Current or Ex-Partner:      Emotionally Abused:      Physically Abused:      Sexually Abused:           Medications  Allergies   Current Outpatient Medications   Medication Sig Dispense Refill     aspirin 325 MG tablet Take 1 tablet by mouth daily. 100 tablet 3     atorvastatin (LIPITOR) 40 MG tablet Take 40 mg by mouth daily       baclofen (LIORESAL) 10 MG tablet Take 1 tablet by mouth 3 times daily       baclofen  (LIORESAL) 20 MG tablet Take 20 mg by mouth 3 times daily. 270 tablet 3     cholecalciferol (VITAMIN-D) 1000 UNIT capsule Take 1 capsule by mouth daily. 90 capsule 3     citalopram (CELEXA) 20 MG tablet Take 2 tablets by mouth daily. 180 tablet 1     gabapentin (NEURONTIN) 300 MG capsule Take 2 capsules by mouth 3 times daily       glatiramer (COPAXONE) 20 MG/ML injection Inject 40 mg Subcutaneous three times a week  1 each 12     levothyroxine (LEVOTHROID) 125 MCG tablet Take 1 tablet by mouth daily.       losartan (COZAAR) 50 MG tablet Take 1 tablet by mouth daily. 90 tablet 3     Multiple Vitamin (MULTIVITAMIN) per tablet Take 1 tablet by mouth daily. 100 tablet 12    Allergies   Allergen Reactions     Codeine Sulfate Rash         Lab Results    Chemistry/lipid CBC Cardiac Enzymes/BNP/TSH/INR   No results found for: CHOL, HDL, TRIG, CREATININE, BUN, NA, CO2 No results found for: WBC, HGB, HCT, MCV, PLT Lab Results   Component Value Date    TSH 6.97 (A) 08/02/2012

## 2021-08-27 NOTE — H&P (VIEW-ONLY)
Assessment/Recommendations   Patient with multiple sclerosis in her recent flare but also has known significant elevation in his calcium score.  He has a newer type of chest discomfort which have some typical features and some atypical features.  I think it would be important to clarify if he has significant epicardial coronary artery disease and has had multiple stress test in the past so I am recommending a CT angiogram.  We will obtain a basic metabolic panel early next week and hopefully can accomplish the CT angiogram next week.    He is on a good dose of statin.  His blood pressure is up a bit today and if his basic metabolic panel on Monday is unremarkable, we will go up on his H2 receptor blocker or add low-dose beta-blocker.    He did have a soft carotid bruit and after his CT scan I think we will also arrange for a carotid ultrasound.    Thank you for allowing us to participate in his care.    35 minutes spent in chart review, patient visit, and documentation.       History of Present Illness/Subjective    Mr. Jarrod Mancera is a 71 year old male with known family history of premature coronary artery disease as well as elevated calcium score.  We have been treating his lipids and blood pressure over the last several years.  He has had chest discomforts of varying types over the years and has had several stress echocardiograms which have been unremarkable.  He came up with a flare of his multiple sclerosis recently which he described as his right foot feeling heavy and numb although he can use it.  He also had a numbness across his shoulders and a feeling in his chest some numbness which is sometimes described as a tightness.  He continues to workout going 30 minutes on a stationary bike and this has no impact on the discomfort.  The discomforts and numbness are constant nature.  His breathing has been stable and he denies orthopnea, paroxysmal nocturnal dyspnea, peripheral edema, syncope or near  syncopal episodes.    ECG: Personally reviewed. {Sinus rhythm with right bundle branch block pattern.  No acute ST-T wave changes noted.       Physical Examination Review of Systems   BP (!) 150/96 (BP Location: Right arm, Patient Position: Sitting, Cuff Size: Adult Regular)   Pulse 64   Resp 16   Wt 69.3 kg (152 lb 11.2 oz)   BMI 23.56 kg/m    Body mass index is 23.56 kg/m .  Wt Readings from Last 3 Encounters:   08/27/21 69.3 kg (152 lb 11.2 oz)   05/29/20 68.9 kg (152 lb)   01/02/19 67.6 kg (149 lb)     General Appearance:   Alert, cooperative and in no acute distress.   ENT/Mouth: Patient wearing a mask.      EYES:  no scleral icterus, normal conjunctivae   Neck: JVP normal. No Hepatojugular reflux. Thyroid not visualized.   Chest/Lungs:   Lungs are clear to auscultation, equal chest wall expansion.   Cardiovascular:   S1, S2 without murmur ,clicks or rubs. Brachial, radial and posterior tibial pulses are intact and symetric.  Soft right carotid bruits noted.   Abdomen:  Nontender. BS+.    Extremities: No cyanosis, clubbing or edema   Skin: no xanthelasma, warm.    Neurologic: normal arm movement bilateral, no tremors     Psychiatric: Appropriate affect.      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;     Family history of myocardial infarction      High cholesterol      Hypertension      Hypertension      MS (multiple sclerosis) (H)      Multiple sclerosis (H)     Past Surgical History:   Procedure Laterality Date     ARTHROSCOPY SHOULDER  1990    L Shoulder     HEAD & NECK SURGERY      Robotic Surgery @ [place] 2011     HERNIA REPAIR  2006     KNEE SURGERY  1970    Family History   Problem Relation Age of Onset     Cancer No family hx of         no skin cancer     Acute  Myocardial Infarction Mother      Acute Myocardial Infarction Father      CABG Sister     Social History     Socioeconomic History     Marital status:      Spouse name: Not on file     Number of children: Not on file     Years of education: Not on file     Highest education level: Not on file   Occupational History     Not on file   Tobacco Use     Smoking status: Former Smoker     Quit date: 1977     Years since quittin.0     Smokeless tobacco: Never Used   Substance and Sexual Activity     Alcohol use: Not on file     Drug use: Not on file     Sexual activity: Not on file   Other Topics Concern     Parent/sibling w/ CABG, MI or angioplasty before 65F 55M? Not Asked   Social History Narrative     Not on file     Social Determinants of Health     Financial Resource Strain:      Difficulty of Paying Living Expenses:    Food Insecurity:      Worried About Running Out of Food in the Last Year:      Ran Out of Food in the Last Year:    Transportation Needs:      Lack of Transportation (Medical):      Lack of Transportation (Non-Medical):    Physical Activity:      Days of Exercise per Week:      Minutes of Exercise per Session:    Stress:      Feeling of Stress :    Social Connections:      Frequency of Communication with Friends and Family:      Frequency of Social Gatherings with Friends and Family:      Attends [employer] Services:      Active Member of Clubs or Organizations:      Attends Club or Organization Meetings:      Marital Status:    Intimate Partner Violence:      Fear of Current or Ex-Partner:      Emotionally Abused:      Physically Abused:      Sexually Abused:           Medications  Allergies   Current Outpatient Medications   Medication Sig Dispense Refill     aspirin 325 MG tablet Take 1 tablet by mouth daily. 100 tablet 3     atorvastatin (LIPITOR) 40 MG tablet Take 40 mg by mouth daily       baclofen (LIORESAL) 10 MG tablet Take 1 tablet by mouth 3 times daily       baclofen  (LIORESAL) 20 MG tablet Take 20 mg by mouth 3 times daily. 270 tablet 3     cholecalciferol (VITAMIN-D) 1000 UNIT capsule Take 1 capsule by mouth daily. 90 capsule 3     citalopram (CELEXA) 20 MG tablet Take 2 tablets by mouth daily. 180 tablet 1     gabapentin (NEURONTIN) 300 MG capsule Take 2 capsules by mouth 3 times daily       glatiramer (COPAXONE) 20 MG/ML injection Inject 40 mg Subcutaneous three times a week  1 each 12     levothyroxine (LEVOTHROID) 125 MCG tablet Take 1 tablet by mouth daily.       losartan (COZAAR) 50 MG tablet Take 1 tablet by mouth daily. 90 tablet 3     Multiple Vitamin (MULTIVITAMIN) per tablet Take 1 tablet by mouth daily. 100 tablet 12    Allergies   Allergen Reactions     Codeine Sulfate Rash         Lab Results    Chemistry/lipid CBC Cardiac Enzymes/BNP/TSH/INR   No results found for: CHOL, HDL, TRIG, CREATININE, BUN, NA, CO2 No results found for: WBC, HGB, HCT, MCV, PLT Lab Results   Component Value Date    TSH 6.97 (A) 08/02/2012

## 2021-08-27 NOTE — LETTER
8/27/2021    Danyel Cortez MD  [facility] [address] [facility] [address]    RE: Jarrod Mancera       Dear Colleague,    I had the pleasure of seeing Jarrod Mancera in the [facility].            Assessment/Recommendations   Patient with multiple sclerosis in her recent flare but also has known significant elevation in his calcium score.  He has a newer type of chest discomfort which have some typical features and some atypical features.  I think it would be important to clarify if he has significant epicardial coronary artery disease and has had multiple stress test in the past so I am recommending a CT angiogram.  We will obtain a basic metabolic panel early next week and hopefully can accomplish the CT angiogram next week.    He is on a good dose of statin.  His blood pressure is up a bit today and if his basic metabolic panel on Monday is unremarkable, we will go up on his H2 receptor blocker or add low-dose beta-blocker.    He did have a soft carotid bruit and after his CT scan I think we will also arrange for a carotid ultrasound.    Thank you for allowing us to participate in his care.    35 minutes spent in chart review, patient visit, and documentation.       History of Present Illness/Subjective    Mr. Jarrod Mancera is a 71 year old male with known family history of premature coronary artery disease as well as elevated calcium score.  We have been treating his lipids and blood pressure over the last several years.  He has had chest discomforts of varying types over the years and has had several stress echocardiograms which have been unremarkable.  He came up with a flare of his multiple sclerosis recently which he described as his right foot feeling heavy and numb although he can use it.  He also had a numbness across his shoulders and a feeling in his chest some numbness which is sometimes described as a tightness.  He continues to  workout going 30 minutes on a stationary bike and this has no impact on the discomfort.  The discomforts and numbness are constant nature.  His breathing has been stable and he denies orthopnea, paroxysmal nocturnal dyspnea, peripheral edema, syncope or near syncopal episodes.    ECG: Personally reviewed. {Sinus rhythm with right bundle branch block pattern.  No acute ST-T wave changes noted.       Physical Examination Review of Systems   BP (!) 150/96 (BP Location: Right arm, Patient Position: Sitting, Cuff Size: Adult Regular)   Pulse 64   Resp 16   Wt 69.3 kg (152 lb 11.2 oz)   BMI 23.56 kg/m    Body mass index is 23.56 kg/m .  Wt Readings from Last 3 Encounters:   08/27/21 69.3 kg (152 lb 11.2 oz)   05/29/20 68.9 kg (152 lb)   01/02/19 67.6 kg (149 lb)     General Appearance:   Alert, cooperative and in no acute distress.   ENT/Mouth: Patient wearing a mask.      EYES:  no scleral icterus, normal conjunctivae   Neck: JVP normal. No Hepatojugular reflux. Thyroid not visualized.   Chest/Lungs:   Lungs are clear to auscultation, equal chest wall expansion.   Cardiovascular:   S1, S2 without murmur ,clicks or rubs. Brachial, radial and posterior tibial pulses are intact and symetric.  Soft right carotid bruits noted.   Abdomen:  Nontender. BS+.    Extremities: No cyanosis, clubbing or edema   Skin: no xanthelasma, warm.    Neurologic: normal arm movement bilateral, no tremors     Psychiatric: Appropriate affect.      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;     Family history of myocardial infarction      High cholesterol      Hypertension      Hypertension      MS (multiple sclerosis) (H)      Multiple sclerosis (H)     Past Surgical History:   Procedure Laterality Date      ARTHROSCOPY SHOULDER      L Shoulder     HEAD & NECK SURGERY      Robotic Surgery @ [place]      HERNIA REPAIR  2006     KNEE SURGERY  1970    Family History   Problem Relation Age of Onset     Cancer No family hx of         no skin cancer     Acute Myocardial Infarction Mother      Acute Myocardial Infarction Father      CABG Sister     Social History     Socioeconomic History     Marital status:      Spouse name: Not on file     Number of children: Not on file     Years of education: Not on file     Highest education level: Not on file   Occupational History     Not on file   Tobacco Use     Smoking status: Former Smoker     Quit date: 1977     Years since quittin.0     Smokeless tobacco: Never Used   Substance and Sexual Activity     Alcohol use: Not on file     Drug use: Not on file     Sexual activity: Not on file   Other Topics Concern     Parent/sibling w/ CABG, MI or angioplasty before 65F 55M? Not Asked   Social History Narrative     Not on file     Social Determinants of Health     Financial Resource Strain:      Difficulty of Paying Living Expenses:    Food Insecurity:      Worried About Running Out of Food in the Last Year:      Ran Out of Food in the Last Year:    Transportation Needs:      Lack of Transportation (Medical):      Lack of Transportation (Non-Medical):    Physical Activity:      Days of Exercise per Week:      Minutes of Exercise per Session:    Stress:      Feeling of Stress :    Social Connections:      Frequency of Communication with Friends and Family:      Frequency of Social Gatherings with Friends and Family:      Attends Mu-ism Services:      Active Member of Clubs or Organizations:      Attends Club or Organization Meetings:      Marital Status:    Intimate Partner Violence:      Fear of Current or Ex-Partner:      Emotionally Abused:      Physically Abused:      Sexually Abused:           Medications  Allergies   Current Outpatient Medications    Medication Sig Dispense Refill     aspirin 325 MG tablet Take 1 tablet by mouth daily. 100 tablet 3     atorvastatin (LIPITOR) 40 MG tablet Take 40 mg by mouth daily       baclofen (LIORESAL) 10 MG tablet Take 1 tablet by mouth 3 times daily       baclofen (LIORESAL) 20 MG tablet Take 20 mg by mouth 3 times daily. 270 tablet 3     cholecalciferol (VITAMIN-D) 1000 UNIT capsule Take 1 capsule by mouth daily. 90 capsule 3     citalopram (CELEXA) 20 MG tablet Take 2 tablets by mouth daily. 180 tablet 1     gabapentin (NEURONTIN) 300 MG capsule Take 2 capsules by mouth 3 times daily       glatiramer (COPAXONE) 20 MG/ML injection Inject 40 mg Subcutaneous three times a week  1 each 12     levothyroxine (LEVOTHROID) 125 MCG tablet Take 1 tablet by mouth daily.       losartan (COZAAR) 50 MG tablet Take 1 tablet by mouth daily. 90 tablet 3     Multiple Vitamin (MULTIVITAMIN) per tablet Take 1 tablet by mouth daily. 100 tablet 12    Allergies   Allergen Reactions     Codeine Sulfate Rash         Lab Results    Chemistry/lipid CBC Cardiac Enzymes/BNP/TSH/INR   No results found for: CHOL, HDL, TRIG, CREATININE, BUN, NA, CO2 No results found for: WBC, HGB, HCT, MCV, PLT Lab Results   Component Value Date    TSH 6.97 (A) 08/02/2012                                               Thank you for allowing me to participate in the care of your patient.      Sincerely,     Tramaine Drummond MD     [facility]  cc:   No referring provider defined for this encounter.

## 2021-08-30 ENCOUNTER — AMBULATORY - RIVER FALLS (OUTPATIENT)
Dept: FAMILY MEDICINE | Facility: CLINIC | Age: 71
End: 2021-08-30
Payer: COMMERCIAL

## 2021-08-30 DIAGNOSIS — I10 ESSENTIAL HYPERTENSION: Primary | ICD-10-CM

## 2021-08-30 LAB
ATRIAL RATE - MUSE: 64 BPM
DIASTOLIC BLOOD PRESSURE - MUSE: NORMAL MMHG
INTERPRETATION ECG - MUSE: NORMAL
P AXIS - MUSE: 61 DEGREES
PR INTERVAL - MUSE: 190 MS
QRS DURATION - MUSE: 132 MS
QT - MUSE: 428 MS
QTC - MUSE: 441 MS
R AXIS - MUSE: -17 DEGREES
SYSTOLIC BLOOD PRESSURE - MUSE: NORMAL MMHG
T AXIS - MUSE: 17 DEGREES
VENTRICULAR RATE- MUSE: 64 BPM

## 2021-08-31 LAB
BUN SERPL-MCNC: 18 MG/DL (ref 7–25)
BUN/CREAT RATIO - HISTORICAL: ABNORMAL (ref 6–22)
CALCIUM SERPL-MCNC: 9.5 MG/DL (ref 8.6–10.3)
CHLORIDE BLD-SCNC: 101 MMOL/L (ref 98–110)
CO2 SERPL-SCNC: 32 MMOL/L (ref 20–32)
CREAT SERPL-MCNC: 1.1 MG/DL (ref 0.7–1.25)
EGFRCR SERPLBLD CKD-EPI 2021: 68 ML/MIN/1.73M2
GLUCOSE BLD-MCNC: 105 MG/DL (ref 65–99)
POTASSIUM BLD-SCNC: 4.3 MMOL/L (ref 3.5–5.3)
SODIUM SERPL-SCNC: 139 MMOL/L (ref 135–146)

## 2021-09-07 ENCOUNTER — HOSPITAL ENCOUNTER (OUTPATIENT)
Dept: CT IMAGING | Facility: CLINIC | Age: 71
Discharge: HOME | End: 2021-09-07
Attending: INTERNAL MEDICINE | Admitting: INTERNAL MEDICINE
Payer: MEDICARE

## 2021-09-07 VITALS
HEART RATE: 65 BPM | BODY MASS INDEX: 22.73 KG/M2 | SYSTOLIC BLOOD PRESSURE: 155 MMHG | WEIGHT: 150 LBS | DIASTOLIC BLOOD PRESSURE: 83 MMHG | HEIGHT: 68 IN

## 2021-09-07 DIAGNOSIS — I20.9 ANGINA PECTORIS, UNSPECIFIED (H): ICD-10-CM

## 2021-09-07 DIAGNOSIS — G35 MULTIPLE SCLEROSIS (H): ICD-10-CM

## 2021-09-07 DIAGNOSIS — R07.9 CHEST PAIN: Primary | ICD-10-CM

## 2021-09-07 DIAGNOSIS — E78.00 PURE HYPERCHOLESTEROLEMIA: ICD-10-CM

## 2021-09-07 LAB
BSA FOR ECHO PROCEDURE: 1.81 M2
CCTA ASCENDING AORTA: 3.5
CCTA SINUS: 3.6
CV CALCIUM SCORE AGATSTON LM: 42
CV CALCIUM SCORING AGATSON LAD: 649
CV CALCIUM SCORING AGATSTON CX: 142
CV CALCIUM SCORING AGATSTON RCA: 0
CV CALCIUM SCORING AGATSTON TOTAL: 833

## 2021-09-07 PROCEDURE — 75574 CT ANGIO HRT W/3D IMAGE: CPT | Mod: 26 | Performed by: GENERAL ACUTE CARE HOSPITAL

## 2021-09-07 PROCEDURE — 250N000013 HC RX MED GY IP 250 OP 250 PS 637: Performed by: INTERNAL MEDICINE

## 2021-09-07 PROCEDURE — 250N000011 HC RX IP 250 OP 636: Performed by: INTERNAL MEDICINE

## 2021-09-07 PROCEDURE — 75574 CT ANGIO HRT W/3D IMAGE: CPT

## 2021-09-07 RX ORDER — METOPROLOL TARTRATE 1 MG/ML
5-20 INJECTION, SOLUTION INTRAVENOUS
Status: DISCONTINUED | OUTPATIENT
Start: 2021-09-07 | End: 2021-09-08 | Stop reason: HOSPADM

## 2021-09-07 RX ORDER — DILTIAZEM HYDROCHLORIDE 5 MG/ML
5-25 INJECTION INTRAVENOUS
Status: DISCONTINUED | OUTPATIENT
Start: 2021-09-07 | End: 2021-09-08 | Stop reason: HOSPADM

## 2021-09-07 RX ORDER — IOPAMIDOL 755 MG/ML
100 INJECTION, SOLUTION INTRAVASCULAR ONCE
Status: COMPLETED | OUTPATIENT
Start: 2021-09-07 | End: 2021-09-07

## 2021-09-07 RX ORDER — DILTIAZEM HYDROCHLORIDE 5 MG/ML
10 INJECTION INTRAVENOUS
Status: DISCONTINUED | OUTPATIENT
Start: 2021-09-07 | End: 2021-09-08 | Stop reason: HOSPADM

## 2021-09-07 RX ORDER — NITROGLYCERIN 0.4 MG/1
0.4 TABLET SUBLINGUAL ONCE
Status: COMPLETED | OUTPATIENT
Start: 2021-09-07 | End: 2021-09-07

## 2021-09-07 RX ADMIN — NITROGLYCERIN 0.4 MG: 0.4 TABLET SUBLINGUAL at 09:17

## 2021-09-07 RX ADMIN — IOPAMIDOL 100 ML: 755 INJECTION, SOLUTION INTRAVENOUS at 09:43

## 2021-09-07 ASSESSMENT — MIFFLIN-ST. JEOR: SCORE: 1419.9

## 2021-09-08 ENCOUNTER — PREP FOR PROCEDURE (OUTPATIENT)
Dept: CARDIOLOGY | Facility: CLINIC | Age: 71
End: 2021-09-08

## 2021-09-08 ENCOUNTER — TELEPHONE (OUTPATIENT)
Dept: CARDIOLOGY | Facility: CLINIC | Age: 71
End: 2021-09-08

## 2021-09-08 DIAGNOSIS — R93.1 ABNORMAL COMPUTED TOMOGRAPHY ANGIOGRAPHY OF HEART: Primary | ICD-10-CM

## 2021-09-08 RX ORDER — DIAZEPAM 5 MG
5 TABLET ORAL
Status: CANCELLED | OUTPATIENT
Start: 2021-09-08

## 2021-09-08 RX ORDER — FENTANYL CITRATE 50 UG/ML
25 INJECTION, SOLUTION INTRAMUSCULAR; INTRAVENOUS
Status: DISCONTINUED | OUTPATIENT
Start: 2021-09-08 | End: 2021-09-08 | Stop reason: HOSPADM

## 2021-09-08 RX ORDER — ASPIRIN 81 MG/1
243 TABLET, CHEWABLE ORAL ONCE
Status: CANCELLED | OUTPATIENT
Start: 2021-09-08

## 2021-09-08 RX ORDER — ASPIRIN 325 MG
325 TABLET ORAL ONCE
Status: CANCELLED | OUTPATIENT
Start: 2021-09-08 | End: 2021-09-08

## 2021-09-08 RX ORDER — SODIUM CHLORIDE 9 MG/ML
INJECTION, SOLUTION INTRAVENOUS CONTINUOUS
Status: CANCELLED | OUTPATIENT
Start: 2021-09-08

## 2021-09-08 RX ORDER — LIDOCAINE 40 MG/G
CREAM TOPICAL
Status: CANCELLED | OUTPATIENT
Start: 2021-09-08

## 2021-09-08 NOTE — TELEPHONE ENCOUNTER
Jarrod Mancera  [address]  [facility] [address] [phone] (home)   Emergency Contact: Estela Nicholson, [phone]    Procedure cardiologist:  Dr. Ang or Dr. Glass  PCP:  Danyel Cortez  H&P completed by:  8/27/21, [facility]  Admit date: 9/9/21  Arrival time:  0730  Anticoagulation: None  CPAP: No  Previous PCI: No  Bypass Grafts: No, Received iodinated contrast dye on 9/7/21  Renal Issues: No  Diabetic?: No  Device?: No  Type:  n/a  Ambulation status: Independent    Patient Education/  Angiogram Teaching    Reason for Visit:  Telephone call to discuss pre-procedure education in preparation for: CA poss PCI    Procedure Prep:  Primary Cardiologist note dated: 8/27/21  EKG results obtained, dated: am of procedure  Hemogram results obtained: am of procedure  Basic Metabolic Panel results obtained: am of procedure  Lipid Profile results obtained: am of procedure  Pertinent cardiac test results: 9/7/21 CCTA w/ Ca+ Score  COVID-19 test results obtained: am of procedure      Pre-procedure instructions  Patient instructed to be NPO after midnight.  Patient instructed to shower the evening before or the morning of the procedure.  Patient instructed to arrange for transportation home following procedure from a responsible family member of friend. No driving for at least 24 hours.  Patient instructed to have a responsible adult with them for 24 hours post-procedure.  Post-procedure follow up process.  Conscious sedation discussed.    Pre-procedure medication instructions  Patient instructed on antiplatelet medication.  Continue medications as scheduled, with a small amount of water on the day of the procedure unless indicated.  Patient instructed to take 325 mg of Aspirin am of procedure: Yes  Other medication: instructed to hold over-the-counter vitamins, minerals or supplements a.m. of the procedure.      Patient states understanding of procedure and agrees to proceed.    *PATIENTS RECORDS AVAILABLE IN  EPIC UNLESS OTHERWISE INDICATED*      Patient Active Problem List   Diagnosis     Squamous cell carcinoma of tonsil (H)     Multiple sclerosis (H)     Depressive disorder, not elsewhere classified     Pure hypercholesterolemia     HYPOTHYROIDISM IATROGENIC, OTHER     Chest pain       Current Outpatient Medications   Medication Sig Dispense Refill     aspirin 325 MG tablet Take 1 tablet by mouth daily. 100 tablet 3     atorvastatin (LIPITOR) 40 MG tablet Take 40 mg by mouth daily       baclofen (LIORESAL) 10 MG tablet Take 1 tablet by mouth 3 times daily       baclofen (LIORESAL) 20 MG tablet Take 20 mg by mouth 3 times daily. 270 tablet 3     cholecalciferol (VITAMIN-D) 1000 UNIT capsule Take 1 capsule by mouth daily. 90 capsule 3     citalopram (CELEXA) 20 MG tablet Take 2 tablets by mouth daily. 180 tablet 1     gabapentin (NEURONTIN) 300 MG capsule Take 2 capsules by mouth 3 times daily       glatiramer (COPAXONE) 20 MG/ML injection Inject 40 mg Subcutaneous three times a week  1 each 12     levothyroxine (LEVOTHROID) 125 MCG tablet Take 1 tablet by mouth daily.       losartan (COZAAR) 50 MG tablet Take 1 tablet by mouth daily. 90 tablet 3     Multiple Vitamin (MULTIVITAMIN) per tablet Take 1 tablet by mouth daily. 100 tablet 12       Allergies   Allergen Reactions     Codeine Sulfate Rash         Ximena Lynch, RN

## 2021-09-08 NOTE — TELEPHONE ENCOUNTER
----- Message -----  From: Tramaine Drummond MD  Sent: 9/8/2021   7:22 AM CDT  To: KALPANA Carlos,    Should get cor/possible ASAP.    Thanks,    Tramaine

## 2021-09-08 NOTE — TELEPHONE ENCOUNTER
----- Message -----  From: Clifford Cobos  Sent: 9/8/2021  12:03 PM CDT  To: Ximena Lynch RN  Subject: [facility] ORDERING                                     CORS POSS PCI WITH [facility]/EM[facility]     730AM ADMIT ON 9/9    H&P ON 8/27 WITH [facility]     NO ALERTS     RAPID [place] [facility] [place] PER NP SIL[place]    THANKS!   -CLIFFORD

## 2021-09-09 ENCOUNTER — HOSPITAL ENCOUNTER (OUTPATIENT)
Facility: HOSPITAL | Age: 71
Discharge: HOME | End: 2021-09-09
Attending: INTERNAL MEDICINE | Admitting: INTERNAL MEDICINE
Payer: MEDICARE

## 2021-09-09 VITALS
HEIGHT: 68 IN | WEIGHT: 150 LBS | HEART RATE: 57 BPM | BODY MASS INDEX: 22.73 KG/M2 | DIASTOLIC BLOOD PRESSURE: 98 MMHG | TEMPERATURE: 97.9 F | RESPIRATION RATE: 22 BRPM | SYSTOLIC BLOOD PRESSURE: 171 MMHG | OXYGEN SATURATION: 96 %

## 2021-09-09 DIAGNOSIS — I25.119 CORONARY ARTERY DISEASE INVOLVING NATIVE CORONARY ARTERY OF NATIVE HEART WITH ANGINA PECTORIS (H): Primary | ICD-10-CM

## 2021-09-09 DIAGNOSIS — R93.1 ABNORMAL COMPUTED TOMOGRAPHY ANGIOGRAPHY OF HEART: ICD-10-CM

## 2021-09-09 LAB
ACT BLD: 276 SECONDS (ref 74–150)
ANION GAP SERPL CALCULATED.3IONS-SCNC: 11 MMOL/L (ref 5–18)
ATRIAL RATE - MUSE: 65 BPM
BUN SERPL-MCNC: 19 MG/DL (ref 8–22)
CALCIUM SERPL-MCNC: 9.6 MG/DL (ref 8.5–10.5)
CHLORIDE BLD-SCNC: 106 MMOL/L (ref 98–107)
CHOLEST SERPL-MCNC: 138 MG/DL
CO2 SERPL-SCNC: 24 MMOL/L (ref 22–31)
CREAT SERPL-MCNC: 1.07 MG/DL (ref 0.7–1.3)
DIASTOLIC BLOOD PRESSURE - MUSE: NORMAL MMHG
ERYTHROCYTE [DISTWIDTH] IN BLOOD BY AUTOMATED COUNT: 12.7 % (ref 10–15)
FASTING STATUS PATIENT QL REPORTED: YES
GFR SERPL CREATININE-BSD FRML MDRD: 70 ML/MIN/1.73M2
GLUCOSE BLD-MCNC: 81 MG/DL (ref 70–125)
HCT VFR BLD AUTO: 41.7 % (ref 40–53)
HDLC SERPL-MCNC: 44 MG/DL
HGB BLD-MCNC: 14 G/DL (ref 13.3–17.7)
INTERPRETATION ECG - MUSE: NORMAL
LDLC SERPL CALC-MCNC: 81 MG/DL
MCH RBC QN AUTO: 32.6 PG (ref 26.5–33)
MCHC RBC AUTO-ENTMCNC: 33.6 G/DL (ref 31.5–36.5)
MCV RBC AUTO: 97 FL (ref 78–100)
P AXIS - MUSE: 57 DEGREES
PLATELET # BLD AUTO: 230 10E3/UL (ref 150–450)
POTASSIUM BLD-SCNC: 4.3 MMOL/L (ref 3.5–5)
PR INTERVAL - MUSE: 206 MS
QRS DURATION - MUSE: 90 MS
QT - MUSE: 404 MS
QTC - MUSE: 420 MS
R AXIS - MUSE: 2 DEGREES
RBC # BLD AUTO: 4.29 10E6/UL (ref 4.4–5.9)
SARS-COV-2 RNA RESP QL NAA+PROBE: NEGATIVE
SODIUM SERPL-SCNC: 141 MMOL/L (ref 136–145)
SYSTOLIC BLOOD PRESSURE - MUSE: NORMAL MMHG
T AXIS - MUSE: 11 DEGREES
TRIGL SERPL-MCNC: 66 MG/DL
VENTRICULAR RATE- MUSE: 65 BPM
WBC # BLD AUTO: 6.1 10E3/UL (ref 4–11)

## 2021-09-09 PROCEDURE — C1894 INTRO/SHEATH, NON-LASER: HCPCS | Performed by: INTERNAL MEDICINE

## 2021-09-09 PROCEDURE — 36415 COLL VENOUS BLD VENIPUNCTURE: CPT | Performed by: INTERNAL MEDICINE

## 2021-09-09 PROCEDURE — 258N000003 HC RX IP 258 OP 636: Performed by: INTERNAL MEDICINE

## 2021-09-09 PROCEDURE — 93458 L HRT ARTERY/VENTRICLE ANGIO: CPT | Mod: 26 | Performed by: INTERNAL MEDICINE

## 2021-09-09 PROCEDURE — 93458 L HRT ARTERY/VENTRICLE ANGIO: CPT | Performed by: INTERNAL MEDICINE

## 2021-09-09 PROCEDURE — 93010 ELECTROCARDIOGRAM REPORT: CPT | Performed by: INTERNAL MEDICINE

## 2021-09-09 PROCEDURE — 255N000002 HC RX 255 OP 636: Performed by: INTERNAL MEDICINE

## 2021-09-09 PROCEDURE — 250N000011 HC RX IP 250 OP 636: Performed by: INTERNAL MEDICINE

## 2021-09-09 PROCEDURE — 250N000013 HC RX MED GY IP 250 OP 250 PS 637: Performed by: INTERNAL MEDICINE

## 2021-09-09 PROCEDURE — 93571 IV DOP VEL&/PRESS C FLO 1ST: CPT | Mod: LD | Performed by: INTERNAL MEDICINE

## 2021-09-09 PROCEDURE — 999N000054 HC STATISTIC EKG NON-CHARGEABLE

## 2021-09-09 PROCEDURE — 99152 MOD SED SAME PHYS/QHP 5/>YRS: CPT | Performed by: INTERNAL MEDICINE

## 2021-09-09 PROCEDURE — 80061 LIPID PANEL: CPT | Performed by: INTERNAL MEDICINE

## 2021-09-09 PROCEDURE — 250N000009 HC RX 250: Performed by: INTERNAL MEDICINE

## 2021-09-09 PROCEDURE — 93005 ELECTROCARDIOGRAM TRACING: CPT

## 2021-09-09 PROCEDURE — C1769 GUIDE WIRE: HCPCS | Performed by: INTERNAL MEDICINE

## 2021-09-09 PROCEDURE — 272N000001 HC OR GENERAL SUPPLY STERILE: Performed by: INTERNAL MEDICINE

## 2021-09-09 PROCEDURE — 99153 MOD SED SAME PHYS/QHP EA: CPT | Performed by: INTERNAL MEDICINE

## 2021-09-09 PROCEDURE — 80048 BASIC METABOLIC PNL TOTAL CA: CPT | Performed by: INTERNAL MEDICINE

## 2021-09-09 PROCEDURE — 87635 SARS-COV-2 COVID-19 AMP PRB: CPT | Performed by: NURSE PRACTITIONER

## 2021-09-09 PROCEDURE — 85014 HEMATOCRIT: CPT | Performed by: INTERNAL MEDICINE

## 2021-09-09 PROCEDURE — 250N000013 HC RX MED GY IP 250 OP 250 PS 637: Performed by: NURSE PRACTITIONER

## 2021-09-09 PROCEDURE — 85347 COAGULATION TIME ACTIVATED: CPT

## 2021-09-09 PROCEDURE — 93571 IV DOP VEL&/PRESS C FLO 1ST: CPT | Mod: 26 | Performed by: INTERNAL MEDICINE

## 2021-09-09 RX ORDER — GABAPENTIN 300 MG/1
300 CAPSULE ORAL ONCE
Status: COMPLETED | OUTPATIENT
Start: 2021-09-09 | End: 2021-09-09

## 2021-09-09 RX ORDER — ATORVASTATIN CALCIUM 80 MG/1
80 TABLET, FILM COATED ORAL DAILY
Qty: 90 TABLET | Refills: 3 | Status: SHIPPED | OUTPATIENT
Start: 2021-09-09 | End: 2021-09-17

## 2021-09-09 RX ORDER — BACLOFEN 10 MG/1
20 TABLET ORAL ONCE
Status: COMPLETED | OUTPATIENT
Start: 2021-09-09 | End: 2021-09-09

## 2021-09-09 RX ORDER — NALOXONE HYDROCHLORIDE 0.4 MG/ML
0.4 INJECTION, SOLUTION INTRAMUSCULAR; INTRAVENOUS; SUBCUTANEOUS
Status: DISCONTINUED | OUTPATIENT
Start: 2021-09-09 | End: 2021-09-09 | Stop reason: HOSPADM

## 2021-09-09 RX ORDER — ATORVASTATIN CALCIUM 80 MG/1
80 TABLET, FILM COATED ORAL DAILY
Qty: 30 TABLET | Refills: 11 | Status: SHIPPED | OUTPATIENT
Start: 2021-09-09 | End: 2022-09-28

## 2021-09-09 RX ORDER — METOPROLOL TARTRATE 1 MG/ML
5-10 INJECTION, SOLUTION INTRAVENOUS
Status: CANCELLED | OUTPATIENT
Start: 2021-09-09

## 2021-09-09 RX ORDER — HYDRALAZINE HYDROCHLORIDE 20 MG/ML
10 INJECTION INTRAMUSCULAR; INTRAVENOUS EVERY 4 HOURS PRN
Status: CANCELLED | OUTPATIENT
Start: 2021-09-09

## 2021-09-09 RX ORDER — ONDANSETRON 2 MG/ML
4 INJECTION INTRAMUSCULAR; INTRAVENOUS EVERY 6 HOURS PRN
Status: CANCELLED | OUTPATIENT
Start: 2021-09-09

## 2021-09-09 RX ORDER — FLUMAZENIL 0.1 MG/ML
0.2 INJECTION, SOLUTION INTRAVENOUS
Status: CANCELLED | OUTPATIENT
Start: 2021-09-09 | End: 2021-09-09

## 2021-09-09 RX ORDER — ACETAMINOPHEN 325 MG/1
650 TABLET ORAL EVERY 4 HOURS PRN
Status: CANCELLED | OUTPATIENT
Start: 2021-09-09

## 2021-09-09 RX ORDER — DIAZEPAM 5 MG
5 TABLET ORAL
Status: COMPLETED | OUTPATIENT
Start: 2021-09-09 | End: 2021-09-09

## 2021-09-09 RX ORDER — NALOXONE HYDROCHLORIDE 0.4 MG/ML
0.2 INJECTION, SOLUTION INTRAMUSCULAR; INTRAVENOUS; SUBCUTANEOUS
Status: CANCELLED | OUTPATIENT
Start: 2021-09-09 | End: 2021-09-09

## 2021-09-09 RX ORDER — SODIUM CHLORIDE 9 MG/ML
INJECTION, SOLUTION INTRAVENOUS CONTINUOUS
Status: CANCELLED | OUTPATIENT
Start: 2021-09-09 | End: 2021-09-09

## 2021-09-09 RX ORDER — NALOXONE HYDROCHLORIDE 0.4 MG/ML
0.4 INJECTION, SOLUTION INTRAMUSCULAR; INTRAVENOUS; SUBCUTANEOUS
Status: CANCELLED | OUTPATIENT
Start: 2021-09-09 | End: 2021-09-09

## 2021-09-09 RX ORDER — LIDOCAINE 40 MG/G
CREAM TOPICAL
Status: DISCONTINUED | OUTPATIENT
Start: 2021-09-09 | End: 2021-09-09 | Stop reason: HOSPADM

## 2021-09-09 RX ORDER — NALOXONE HYDROCHLORIDE 0.4 MG/ML
0.2 INJECTION, SOLUTION INTRAMUSCULAR; INTRAVENOUS; SUBCUTANEOUS
Status: DISCONTINUED | OUTPATIENT
Start: 2021-09-09 | End: 2021-09-09 | Stop reason: HOSPADM

## 2021-09-09 RX ORDER — FLUMAZENIL 0.1 MG/ML
0.2 INJECTION, SOLUTION INTRAVENOUS
Status: DISCONTINUED | OUTPATIENT
Start: 2021-09-09 | End: 2021-09-09 | Stop reason: HOSPADM

## 2021-09-09 RX ORDER — FENTANYL CITRATE 50 UG/ML
25 INJECTION, SOLUTION INTRAMUSCULAR; INTRAVENOUS
Status: CANCELLED | OUTPATIENT
Start: 2021-09-09

## 2021-09-09 RX ORDER — ASPIRIN 81 MG/1
81 TABLET, CHEWABLE ORAL ONCE
Status: CANCELLED | OUTPATIENT
Start: 2021-09-09 | End: 2021-09-09

## 2021-09-09 RX ORDER — ATROPINE SULFATE 0.1 MG/ML
0.5 INJECTION INTRAVENOUS
Status: DISCONTINUED | OUTPATIENT
Start: 2021-09-09 | End: 2021-09-09 | Stop reason: HOSPADM

## 2021-09-09 RX ORDER — HEPARIN SODIUM 1000 [USP'U]/ML
INJECTION, SOLUTION INTRAVENOUS; SUBCUTANEOUS
Status: DISCONTINUED | OUTPATIENT
Start: 2021-09-09 | End: 2021-09-09 | Stop reason: HOSPADM

## 2021-09-09 RX ORDER — SODIUM CHLORIDE 9 MG/ML
INJECTION, SOLUTION INTRAVENOUS CONTINUOUS
Status: DISCONTINUED | OUTPATIENT
Start: 2021-09-09 | End: 2021-09-09 | Stop reason: HOSPADM

## 2021-09-09 RX ORDER — NITROGLYCERIN 0.4 MG/1
0.4 TABLET SUBLINGUAL EVERY 5 MIN PRN
Status: CANCELLED | OUTPATIENT
Start: 2021-09-09

## 2021-09-09 RX ORDER — ASPIRIN 81 MG/1
81 TABLET ORAL DAILY
Status: CANCELLED | OUTPATIENT
Start: 2021-09-10

## 2021-09-09 RX ORDER — ASPIRIN 325 MG
325 TABLET ORAL ONCE
Status: DISCONTINUED | OUTPATIENT
Start: 2021-09-09 | End: 2021-09-09 | Stop reason: HOSPADM

## 2021-09-09 RX ORDER — ASPIRIN 81 MG/1
243 TABLET, CHEWABLE ORAL ONCE
Status: DISCONTINUED | OUTPATIENT
Start: 2021-09-09 | End: 2021-09-09 | Stop reason: HOSPADM

## 2021-09-09 RX ORDER — ONDANSETRON 4 MG/1
4 TABLET, ORALLY DISINTEGRATING ORAL EVERY 6 HOURS PRN
Status: CANCELLED | OUTPATIENT
Start: 2021-09-09

## 2021-09-09 RX ORDER — FENTANYL CITRATE 50 UG/ML
INJECTION, SOLUTION INTRAMUSCULAR; INTRAVENOUS
Status: DISCONTINUED | OUTPATIENT
Start: 2021-09-09 | End: 2021-09-09 | Stop reason: HOSPADM

## 2021-09-09 RX ORDER — AMLODIPINE BESYLATE 2.5 MG/1
2.5 TABLET ORAL DAILY
Qty: 30 TABLET | Refills: 3 | Status: SHIPPED | OUTPATIENT
Start: 2021-09-09 | End: 2021-09-17 | Stop reason: DRUGHIGH

## 2021-09-09 RX ORDER — ATROPINE SULFATE 0.1 MG/ML
0.5 INJECTION INTRAVENOUS
Status: CANCELLED | OUTPATIENT
Start: 2021-09-09 | End: 2021-09-09

## 2021-09-09 RX ORDER — LIDOCAINE 40 MG/G
CREAM TOPICAL
Status: CANCELLED | OUTPATIENT
Start: 2021-09-09

## 2021-09-09 RX ORDER — IODIXANOL 320 MG/ML
INJECTION, SOLUTION INTRAVASCULAR
Status: DISCONTINUED | OUTPATIENT
Start: 2021-09-09 | End: 2021-09-09 | Stop reason: HOSPADM

## 2021-09-09 RX ORDER — FENTANYL CITRATE 50 UG/ML
25 INJECTION, SOLUTION INTRAMUSCULAR; INTRAVENOUS
Status: DISCONTINUED | OUTPATIENT
Start: 2021-09-09 | End: 2021-09-09 | Stop reason: HOSPADM

## 2021-09-09 RX ADMIN — GABAPENTIN 300 MG: 300 CAPSULE ORAL at 09:28

## 2021-09-09 RX ADMIN — GABAPENTIN 300 MG: 300 CAPSULE ORAL at 11:00

## 2021-09-09 RX ADMIN — DIAZEPAM 5 MG: 5 TABLET ORAL at 09:28

## 2021-09-09 RX ADMIN — SODIUM CHLORIDE: 9 INJECTION, SOLUTION INTRAVENOUS at 09:31

## 2021-09-09 RX ADMIN — BACLOFEN 20 MG: 10 TABLET ORAL at 09:28

## 2021-09-09 ASSESSMENT — MIFFLIN-ST. JEOR: SCORE: 1419.77

## 2021-09-09 NOTE — DISCHARGE INSTRUCTIONS
Amlodipine Besylate Oral tablet  What is this medicine?  AMLODIPINE (am TOY maria) is a calcium-channel blocker. It affects the amount of calcium found in your heart and muscle cells. This relaxes your blood vessels, which can reduce the amount of work the heart has to do. This medicine is used to lower high blood pressure. It is also used to prevent chest pain.  This medicine may be used for other purposes; ask your health care provider or pharmacist if you have questions.  What should I tell my health care provider before I take this medicine?  They need to know if you have any of these conditions:    heart problems like heart failure or aortic stenosis    liver disease    an unusual or allergic reaction to amlodipine, other medicines, foods, dyes, or preservatives    pregnant or trying to get pregnant    breast-feeding  How should I use this medicine?  Take this medicine by mouth with a glass of water. Follow the directions on the prescription label. Take your medicine at regular intervals. Do not take more medicine than directed.  Talk to your pediatrician regarding the use of this medicine in children. Special care may be needed. This medicine has been used in children as young as 6.  Persons over 65 years old may have a stronger reaction to this medicine and need smaller doses.  Overdosage: If you think you have taken too much of this medicine contact a poison control center or emergency room at once.  NOTE: This medicine is only for you. Do not share this medicine with others.  What if I miss a dose?  If you miss a dose, take it as soon as you can. If it is almost time for your next dose, take only that dose. Do not take double or extra doses.  What may interact with this medicine?    herbal or dietary supplements    local or general anesthetics    medicines for high blood pressure    medicines for prostate problems    rifampin  This list may not describe all possible interactions. Give your health care  provider a list of all the medicines, herbs, non-prescription drugs, or dietary supplements you use. Also tell them if you smoke, drink alcohol, or use illegal drugs. Some items may interact with your medicine.  What should I watch for while using this medicine?  Visit your doctor or health care professional for regular check ups. Check your blood pressure and pulse rate regularly. Ask your health care professional what your blood pressure and pulse rate should be, and when you should contact him or her.  This medicine may make you feel confused, dizzy or lightheaded. Do not drive, use machinery, or do anything that needs mental alertness until you know how this medicine affects you. To reduce the risk of dizzy or fainting spells, do not sit or stand up quickly, especially if you are an older patient. Avoid alcoholic drinks; they can make you more dizzy.  Do not suddenly stop taking amlodipine. Ask your doctor or health care professional how you can gradually reduce the dose.  What side effects may I notice from receiving this medicine?  Side effects that you should report to your doctor or health care professional as soon as possible:    allergic reactions like skin rash, itching or hives, swelling of the face, lips, or tongue    breathing problems    changes in vision or hearing    chest pain    fast, irregular heartbeat    swelling of legs or ankles  Side effects that usually do not require medical attention (report to your doctor or health care professional if they continue or are bothersome):    dry mouth    facial flushing    nausea, vomiting    stomach gas, pain    tired, weak    trouble sleeping  This list may not describe all possible side effects. Call your doctor for medical advice about side effects. You may report side effects to FDA at 8-845-FDA-3151.  Where should I keep my medicine?  Keep out of the reach of children.  Store at room temperature between 59 and 86 degrees F (15 and 30 degrees C).  Protect from light. Keep container tightly closed. Throw away any unused medicine after the expiration date.  NOTE:This sheet is a summary. It may not cover all possible information. If you have questions about this medicine, talk to your doctor, pharmacist, or health care provider. Copyright  2016 Gold Standard      Interventional Cardiology  Coronary Angiogram/Angioplasty/Stent/Atherectomy Discharge  Instructions -   Radial (wrist) Approach     The instructions below are to help you understand how to take care of yourself. There is also information about when to call the doctor or emergency services.    **Do not stop your aspirin or platelet inhibitor unless directed by your Cardiologist.  These medications help to prevent platelets in your blood from sticking together and forming a clot.   Examples of these medications are: Ticagrelor (Brilinta), Clopidogrel (Plavix), Prasugrel (Effient)    For 24 hours after procedure:    Do not subject hand/arm to any forceful movements for 24 hours, such as supporting weight when rising from a chair or bed.    Do not drive a car for 24 hours.    The dressing on the puncture site may be removed after 24 hours and left open to air. If minor oozing, you may apply a Band-Aid and remove after 12 hours.     You may shower on the day after your procedure. Do not take a tub bath or wash dishes (no soaking wrist) with the puncture site in water for 3 days after the procedure.    For 48 hours following the procedure:    Do not operate a lawnmower, motorcycle, chainsaw or all-terrain vehicle.    Do not lift anything heavier than 5-10 pounds with affected arm for 5 days.    Avoid excessive bending (flexion/extension) wrist movement.    Do not engage in vigorous exercise (i.e. tennis, golf) using the affected arm for 5 days after discharge.    You may return to work in 72 hours if no complications and no heavy lifting.    If bleeding should occur following discharge:    Sit down and apply  firm pressure with your thumb against the puncture site and fingers against back of wrist for 10 minutes.    If the bleeding stops, continue to rest, keeping your wrist still for 2 hours. Notify your doctor as soon as possible.    If bleeding does not stop after 10 minutes or if there is a large amount of bleeding or spurting, call 911 immediately. Do not drive yourself to the hospital.           Contact the [facility] at [phone] if you develop:    Fever over 100.4, that lasts more than one day    Redness, heat, or pus at the puncture site    Change in color or temperature of the hand or arm    Expect mild tingling of hand and tenderness at the puncture site for up to 3 days. You may take Tylenol or a pain medicine recommended by your doctor.                       Our Cardiac Rehab staff may visit briefly with you while your in the hospital.   If they miss you, someone will contact you after you are home.  You are encouraged to enroll in an Outpatient Cardiac Rehab Program         Your Procedural Physician was: Dr. Placido Ang  the phone number is: [phone]    [facility]:  [phone]  If you are calling after hours, please listen to the entire voicemail, a live  will answer at the end of the message

## 2021-09-09 NOTE — PRE-PROCEDURE
GENERAL PRE-PROCEDURE:   Procedure:  Coronary angiogram with possible PCI  Date/Time:  9/9/2021 8:45 AM    Written consent obtained?: Yes    Risks and benefits: Risks, benefits and alternatives were discussed    Consent given by:  Patient  Patient states understanding of procedure being performed: Yes    Patient's understanding of procedure matches consent: Yes    Procedure consent matches procedure scheduled: Yes    Expected level of sedation:  Moderate  Appropriately NPO:  Yes  ASA Class:  3 (Chest pain, elevated Ca2+ score, + family hx of CAD, Multiple Sclerosis)  Mallampati  :  Grade 1- soft palate, uvula, tonsillar pillars, and posterior pharyngeal wall visible  Lungs:  Lungs clear with good breath sounds bilaterally  Heart:  Normal heart sounds and rate  History & Physical reviewed:  History and physical reviewed and no updates needed  Statement of review:  I have reviewed the lab findings, diagnostic data, medications, and the plan for sedation

## 2021-09-17 ENCOUNTER — OFFICE VISIT (OUTPATIENT)
Dept: CARDIOLOGY | Facility: CLINIC | Age: 71
End: 2021-09-17
Payer: COMMERCIAL

## 2021-09-17 VITALS
BODY MASS INDEX: 23.48 KG/M2 | HEART RATE: 68 BPM | WEIGHT: 154.4 LBS | SYSTOLIC BLOOD PRESSURE: 150 MMHG | DIASTOLIC BLOOD PRESSURE: 68 MMHG | RESPIRATION RATE: 16 BRPM

## 2021-09-17 DIAGNOSIS — R07.2 PRECORDIAL PAIN: ICD-10-CM

## 2021-09-17 DIAGNOSIS — R09.89 BRUIT OF RIGHT CAROTID ARTERY: ICD-10-CM

## 2021-09-17 DIAGNOSIS — E78.00 PURE HYPERCHOLESTEROLEMIA: ICD-10-CM

## 2021-09-17 DIAGNOSIS — R93.1 ABNORMAL COMPUTED TOMOGRAPHY ANGIOGRAPHY OF HEART: ICD-10-CM

## 2021-09-17 DIAGNOSIS — I25.119 CORONARY ARTERY DISEASE INVOLVING NATIVE CORONARY ARTERY OF NATIVE HEART WITH ANGINA PECTORIS (H): Primary | ICD-10-CM

## 2021-09-17 DIAGNOSIS — I10 BENIGN ESSENTIAL HYPERTENSION: ICD-10-CM

## 2021-09-17 PROCEDURE — 99215 OFFICE O/P EST HI 40 MIN: CPT | Performed by: NURSE PRACTITIONER

## 2021-09-17 RX ORDER — AMLODIPINE BESYLATE 5 MG/1
5 TABLET ORAL DAILY
Qty: 90 TABLET | Refills: 3 | Status: SHIPPED | OUTPATIENT
Start: 2021-09-17 | End: 2022-09-19

## 2021-09-17 NOTE — PATIENT INSTRUCTIONS
Jarrod Mancera,    It was a pleasure to see you today at the [facility].     My recommendations after this visit include:    - Increased Amlodipine from 2.5 mg to 5 mg daily    - Please call if you develop lightheaded, dizziness or any new side effects    - Please seek medical attention if you develop recurrent chest pain or shortness of breath or similar symptoms you experienced prior to recent cardiac event    - Follow up with Dr. Drummond as scheduled in November    - Please call Ximena (Dr. Drummond's nurse) or KALPANA Gregory  On [phone], if you have any questions    - Please call [phone], if you have any questions or concerns    Celena Good CNP    Medication     o Take all your medications as prescribed  o Do not stop any medications without talking with a healthcare provider    Exercise      o Physical activity is important for overall health  o Set a goal of 150 minutes of exercise each week  o For example, 30 minutes of exercise 5 days each week.    o These 30 minutes can be broken into shorter periods of 15 minutes twice daily or 10 minutes three times daily  o Start any exercise program slowly and work towards the goal of 150 minutes each week  o For example, you may start with 10 minutes and plan to add a few minutes each week as you get stronger   o Examples of exercise include walking, swimming, or biking  o Remember to stretch and stay hydrated with exercise    Diet     o A heart healthy diet includes:  o A variety of fruits and vegetables  o Whole grains  o Low-fat dairy (fat-free, 1% fat, and low-fat)  o Lean meats and poultry without skin   o Fish (eat fish 2 times each week)  o Nuts  o Limit saturated fat to about 13 grams each day (based on a 2000 calorie diet)  o Limit red meat  o Limit sugars (sweets and sugary beverages)  o Limit your portion sizes  o Do not add salt to your food when cooking or at the table  o Limit alcohol intake (no more than 1 drink each day for  women or 2 drinks each day for men)    Weight Loss     o Work on losing weight with diet and exercise  o You BMI (body mass index) should be between 18.5-24.9  o This is a calculation of your weight and height  o Please ask your healthcare provider for your BMI    Manage Other Chronic Health Conditions     o Control cholesterol  o Eat a diet low in saturated fat  o Exercise   o Take a statin medication as prescribed  o Manage blood pressure  o Eat a diet low in sodium  o Exercise  o Reduce stress  o Lose weight   o Take blood pressure medications as prescribed  o Control blood sugars if diabetic  o Monitor sugars and carbohydrates in your diet  o Lose weight   o Take diabetes medications as prescribed  o Follow-up with your primary care provider to make sure your blood sugars are well controlled    Stress Reduction     o Find time each day to relax  o Reading, listening to music, yoga, meditation, exercise, spending time with friends and family, volunteering   o Get 6-8 hours of sleep each night    Smoking Cessation     o Smoking causes numerous health problems including coronary artery disease  o It is never too late to quit  o Set realistic goals for quitting  o Decrease the number of cigarettes used each week  o Use nicotine gum or patches to help you quit    Information from the American Heart Association.  Please visit their website at www.heart.org    Patient Education     Eating Heart-Healthy Foods  Eating has a big impact on your heart health. In fact, eating healthier can improve several of your heart risks at once. For instance, it helps you manage weight, cholesterol, and blood pressure. Here are ideas to help you make heart-healthy changes without giving up all the foods and flavors you love.   Getting started    Talk with your healthcare provider about eating plans, such as the DASH or Mediterranean diet. You may also be referred to a dietitian.    Change a few things at a time. Give yourself time to  get used to a few eating changes before adding more.    Work to create a tasty, healthy eating plan that you can stick to for the rest of your life.    Goals for healthy eating  Below are some tips to improve your eating habits:     Limit saturated fats and trans fats. Saturated fats raise your levels of cholesterol, so keep these fats to a minimum. They are found in foods such as fatty meats, whole milk, cheese, and palm and coconut oils. Avoid trans fats because they lower good cholesterol as well as raise bad cholesterol. Trans fats are most often found in processed foods, such as pastries, cookies, pies, muffins, fried foods, stick margarines, and shortening.    Reduce how much sodium (salt) you have. Eating too much salt may increase your blood pressure. Limit your sodium intake to 2,300 milligrams (mg) per day (the amount in 1 teaspoon of salt), or less if your healthcare provider recommends it. Dining out less often and eating fewer processed foods are two great ways to decrease the amount of salt you consume. At home, flavor your foods with other spices and herbs instead of salt.    Managing calories. A calorie is a unit of energy. Your body burns calories for fuel, but if you eat more calories than your body burns, the extras are stored as fat. Your healthcare provider can help you create a diet plan to manage your calories. This will likely include eating healthier foods and getting regular exercise. To help you track your progress, keep a diary to record what you eat and how often you exercise.  Choose the right foods  Aim to make these foods staples of your diet. If you have diabetes, you may have different recommendations than what is listed here:     Fruits and vegetables provide plenty of nutrients without a lot of calories. At meals, fill half your plate with these foods. Choose between fresh, frozen, canned, or dried without added sauces, salt, or sugars. Split the other half of your plate between  whole grains and lean protein.    Whole grains are high in fiber and rich in vitamins and nutrients. Good choices include whole wheat bread, pasta, oats, and brown rice. Make at least half of your grains whole grains.    Lean proteins give you nutrition with less fat. Good choices include fish, skinless chicken and turkey, and beans. Draining the fat from cooked ground meat is another way to reduce the amount of fat you eat.    Low-fat and nonfat dairy provide nutrients without a lot of fat. Try low-fat or nonfat milk, cheese, or yogurt.    Healthy fats can be good for you in small amounts. These are unsaturated fats, such as olive oil, nuts, and fish. Try to have at least 2 servings per week of fatty fish, such as salmon, sardines, mackerel, rainbow trout, and albacore tuna. These contain omega-3 fatty acids, which are good for your heart. Flaxseed and walnuts are other sources of heart-healthy fats.  More on heart-healthy eating  Read food labels  Healthy eating starts at the grocery store. Be sure to pay attention to food labels on packaged foods. Look for products that are high in fiber and protein, and low in saturated fat, added sugars, and sodium. Avoid products that contain trans fat. And pay close attention to serving size. For instance, if you plan to eat two servings, double all the numbers on the label.   Prepare food right  A key part of healthy cooking is cutting down on added fat, sugar and salt. Look on the internet for lower-fat, lower-sodium recipes without a lot of added sugars. Also try these tips:     Remove fat from meat and skin from poultry before cooking.    Skim fat from the surface of soups and sauces.    Broil, roast, boil, bake, steam, grill, or microwave food without added fats.    Choose ingredients that spice up your food without adding calories, fat, sugar, or sodium. Try these items: horseradish, hot sauce, lemon, mustard, nonfat salad dressings, and vinegar. Small amounts of olive  oil-based vinaigrettes are OK, too. For salt-free herbs and spices, try basil, cilantro, cinnamon, cumin, paprika, pepper, and rosemary.  [employer] last reviewed this educational content on 7/1/2020 2000-2021 [employer]. All rights reserved. This information is not intended as a substitute for professional medical care. Always follow your healthcare professional's instructions.

## 2021-09-17 NOTE — PROGRESS NOTES
Assessment/Recommendations   Assessment:    1.  Coronary artery disease: Coronary angiogram done on 9/9/2021 showed 55% lesion in mid LAD, 45% lesion in RPDA, 40% lesion in ostial to proximal LAD, and 15% lesion in left main to mid left main.    Patient was initiated on amlodipine 2.5 mg daily for angina.  Dr. Ang has recommended to consider PCI to lesion in mid LAD if patient does not tolerate amlodipine or persistent symptoms.    He is currently on aspirin 325 mg daily.  Patient reports feeling overall better with his chest symptoms since recent coronary angiogram and started on amlodipine.    Reviewed most recent BMP, Hgb, platelet- stable.    2.  Dyslipidemia with LDL goal <70/Obesity with a BMI of: Jarrod Mancera is on high intensity statin therapy with atorvastatin 80 mg daily.  Most recent LDL 81 on 9/9/21.     3.  Hypertension: His blood pressure is elevated at 150/68.  He reports his home blood pressure runs in 120s to 130s systolic.  He is on amlodipine, and losartan 50 mg daily.    4.  Bruit on right carotid artery per assessment: Patient reports feeling weak in his right arm and right leg which she thinks is due to MS.  Dr. Drummond has recommended bilateral carotid ultrasound.  Patient is interested.    Plan:  -Continue aspirin    -Increase amlodipine from 2.5 mg daily to 5 mg daily.  Encouraged to call if experiences lightheadedness, dizziness or any new side effects.    - Encouraged to seek medical attention if recurrent chest pain or shortness of breath.    - Risk factor modification and lifestyle management topics were discussed including managing comorbidities, heart healthy diet, exercise, stress reduction, alcohol use and drug use.      -  We discussed a diet low in saturated fat, weight loss, and exercise along with medication for better control of cholesterol.  Highly encouraged to participate in nutrition class in cardiac rehab.    -I will request Dr. Bhanu Bueno's nurse to help  process the order for carotid ultrasound.    Follow up with Dr. Drummond as scheduled in November     History of Present Illness/Subjective    Mr. Jarrod Mancera is a 71 year old male with a past medical history of multiple sclerosis with most recent flareup in June 2021, pure hypercholesterolemia, hypothyroidism, ongoing intermittent chest discomfort and recent abnormal CTA coronary who is seen at [facility] for post coronary intervention follow up.     Patient saw Dr. Drummond in late August complaining of new vertebral chest discomfort with mixed of typical and atypical features.  He underwent CT coronary on 9/7/2021 showed calcium score of 833 with dense calcific plaque in ostial and proximal LAD which obscures the coronary lumen.  Also noted moderate to severe disease in mid LAD.  Patient underwent coronary angiogram showed mild to moderate disease.  He was recommended medical therapy.    Today, Jarrod reports that he is overall feeling better since recent coronary angiogram and was started on amlodipine.  However, he continues to have some weakness in his right arm and right leg which he thinks is due to multiple sclerosis.  He denies fatigue, lightheadedness, shortness of breath, dyspnea on exertion, orthopnea, PND, palpitations, chest pain, abdominal fullness/bloating and lower extremity edema.  He is interested in undergoing carotid ultrasound.  He does not appear any bleeding complications.  He continues to remain active and exercises regularly.    Coronary Angiogram done on 9/8/21- reviewed:  Conclusion       Mid LAD lesion is 55% stenosed.    Pressure wire/FFR was performed on the lesion. FFR: 0.81.    The left ventricular ejection fraction is grossly normal.    Ost LM to Mid LM lesion is 15% stenosed.    Ost LAD to Prox LAD lesion is 40% stenosed.    RPDA lesion is 45% stenosed.     70 yo male with strong family hx of CAD and multiple sclerosis with abn CTA coronary  arteries with fatigue and chest pressure     Noted prior to angiography baseline  to 184 mmHg     Angiography via left radial  LM mild ostial dz  LAD calcified prox 40-50% and mid 50-60% with FFR 0.81  Circ mild dz  RCA PDA diffuse 40%     LVEDP 7mmHg  LV gram normal EF no WMA noted mild LVH     Imp/plan  1. CAD - moderate to severe dz in mid LAD with boarderline FFR in the setting of fatigue but not exertional chest pain, would first trial medical therapy adding amlodipine 5mg daily for both HTN control and potential angina relief, noted LDL 80mg/dL, consider raising atorvastatin 80mg if no contraindications from having muscular dystrophy with goal LDL < 70mg/dL.  If no improvement or intolerance of amlodipine, would suggest return for IVUS and PCI with rotablator to mid LAD.  Will set up cardiac rehab for stable angina, follow up with NP and Dr. Drummond in [facility].         CTA Coronary Angiogram done on 9/7/21: Reviewed  Interpretation Summary    The total Agatston score is 833. A calcium score in this range places the individual in the 83rd percentile when compared to an age and gender matched control group.    Dense calcific plaque in the ostial and proximal LAD which obscures the coronary lumen. Cannot rule out the possiblity of severe stenosis.    Mixed calcific and noncalcific plaque in the mid LAD causing moderate-to-severe (60-70%) stenosis.        Physical Examination Review of Systems   BP (!) 150/68 (BP Location: Left arm, Patient Position: Sitting, Cuff Size: Adult Regular)   Pulse 68   Resp 16   Wt 70 kg (154 lb 6.4 oz)   BMI 23.48 kg/m    Body mass index is 23.48 kg/m .  Wt Readings from Last 3 Encounters:   09/17/21 70 kg (154 lb 6.4 oz)   09/09/21 68 kg (150 lb)   09/07/21 68 kg (150 lb)     General Appearance:   no distress, normal body habitus   ENT/Mouth: membranes moist, no oral lesions or bleeding gums.      EYES:  no scleral icterus, normal conjunctivae   Neck: no carotid bruits  or thyromegaly   Chest/Lungs:   lungs are clear to auscultation, no rales or wheezing, equal chest wall expansion    Cardiovascular:   Heart rate regular. Normal first and second heart sounds with no murmurs, rubs, or gallops; the carotid, radial and posterior tibial pulses are intact, Jugular venous pressure flat with no edema bilaterally    Abdomen:  no organomegaly, masses, bruits, or tenderness; bowel sounds are present   Extremities  Puncture Site: no cyanosis or clubbing  Left radial site is soft with .  Radial pulses and Pedal pulses intact and symmetrical.  CMS intact.   Skin: no xanthelasma, warm.    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;     Family history of myocardial infarction      High cholesterol      Hypertension      Hypertension      MS (multiple sclerosis) (H)      Multiple sclerosis (H)     Past Surgical History:   Procedure Laterality Date     ARTHROSCOPY SHOULDER  1990    L Shoulder     CV CORONARY ANGIOGRAM N/A 9/9/2021    Procedure: Coronary Angiogram;  Surgeon: Placido Ang MD;  Location: [facility] CATH LAB CV     CV FRACTIONAL FLOW RATIO WIRE N/A 9/9/2021    Procedure: Fractional Flow Ratio Wire;  Surgeon: Placido Ang MD;  Location: [facility] CATH LAB CV     CV LEFT VENTRICULOGRAM N/A 9/9/2021    Procedure: Left Ventriculogram;  Surgeon: Placido Ang MD;  Location: [facility] CATH LAB CV     HEAD & NECK SURGERY      Robotic Surgery @ [place] 2011     HERNIA REPAIR  2006     KNEE SURGERY  1970    Family History   Problem Relation Age of Onset     Cancer No family hx of         no skin cancer     Acute Myocardial Infarction Mother      Acute Myocardial Infarction Father      CABG Sister     Social  History     Socioeconomic History     Marital status:      Spouse name: Not on file     Number of children: Not on file     Years of education: Not on file     Highest education level: Not on file   Occupational History     Not on file   Tobacco Use     Smoking status: Former Smoker     Quit date: 1977     Years since quittin.1     Smokeless tobacco: Never Used   Substance and Sexual Activity     Alcohol use: Not on file     Drug use: Not on file     Sexual activity: Not on file   Other Topics Concern     Parent/sibling w/ CABG, MI or angioplasty before 65F 55M? Not Asked   Social History Narrative     Not on file     Social Determinants of Health     Financial Resource Strain:      Difficulty of Paying Living Expenses:    Food Insecurity:      Worried About Running Out of Food in the Last Year:      Ran Out of Food in the Last Year:    Transportation Needs:      Lack of Transportation (Medical):      Lack of Transportation (Non-Medical):    Physical Activity:      Days of Exercise per Week:      Minutes of Exercise per Session:    Stress:      Feeling of Stress :    Social Connections:      Frequency of Communication with Friends and Family:      Frequency of Social Gatherings with Friends and Family:      Attends Worship Services:      Active Member of Clubs or Organizations:      Attends Club or Organization Meetings:      Marital Status:    Intimate Partner Violence:      Fear of Current or Ex-Partner:      Emotionally Abused:      Physically Abused:      Sexually Abused:           Medications  Allergies   Current Outpatient Medications   Medication Sig Dispense Refill     amLODIPine (NORVASC) 5 MG tablet Take 1 tablet (5 mg) by mouth daily 90 tablet 3     aspirin 325 MG tablet Take 1 tablet by mouth daily. 100 tablet 3     atorvastatin (LIPITOR) 80 MG tablet Take 1 tablet (80 mg) by mouth daily 30 tablet 11     baclofen (LIORESAL) 10 MG tablet Take 1 tablet by mouth 3 times daily        cholecalciferol (VITAMIN-D) 1000 UNIT capsule Take 1 capsule by mouth daily. 90 capsule 3     citalopram (CELEXA) 20 MG tablet Take 2 tablets by mouth daily. 180 tablet 1     gabapentin (NEURONTIN) 300 MG capsule Take 2 capsules by mouth 3 times daily       glatiramer (COPAXONE) 20 MG/ML injection Inject 40 mg Subcutaneous three times a week  1 each 12     levothyroxine (LEVOTHROID) 125 MCG tablet Take 1 tablet by mouth daily.       losartan (COZAAR) 50 MG tablet Take 1 tablet by mouth daily. 90 tablet 3     Multiple Vitamin (MULTIVITAMIN) per tablet Take 1 tablet by mouth daily. 100 tablet 12     ANTIPLATELET MEDICATION NOT PRESCRIBED, INTENTIONAL, Antiplatelet medication not prescribed intentionally due to not indicated (Patient not taking: Reported on 9/17/2021)      Allergies   Allergen Reactions     Codeine Sulfate Rash         Lab Results    Chemistry/lipid CBC Cardiac Enzymes/BNP/TSH/INR   Lab Results   Component Value Date    CHOL 138 09/09/2021    HDL 44 09/09/2021    TRIG 66 09/09/2021    BUN 19 09/09/2021     09/09/2021    CO2 24 09/09/2021    Lab Results   Component Value Date    WBC 6.1 09/09/2021    HGB 14.0 09/09/2021    HCT 41.7 09/09/2021    MCV 97 09/09/2021     09/09/2021    Lab Results   Component Value Date    TSH 6.97 (A) 08/02/2012        50  minutes spent on the date of encounter doing chart review, review of test results, interpretation with above tests, patient visit, documentation and discussion with other provider.        This note has been dictated using voice recognition software. Any grammatical, typographical, or context distortions are unintentional and inherent to the software

## 2021-09-17 NOTE — LETTER
9/17/2021    Danyel Cortez MD  [facility] [address] [facility] [address]    RE: Jarrod Mancera       Dear Colleague,    I had the pleasure of seeing Jarrod Mancera in the [facility].            Assessment/Recommendations   Assessment:    1.  Coronary artery disease: Coronary angiogram done on 9/9/2021 showed 55% lesion in mid LAD, 45% lesion in RPDA, 40% lesion in ostial to proximal LAD, and 15% lesion in left main to mid left main.    Patient was initiated on amlodipine 2.5 mg daily for angina.  Dr. Ang has recommended to consider PCI to lesion in mid LAD if patient does not tolerate amlodipine or persistent symptoms.    He is currently on aspirin 325 mg daily.  Patient reports feeling overall better with his chest symptoms since recent coronary angiogram and started on amlodipine.    Reviewed most recent BMP, Hgb, platelet- stable.    2.  Dyslipidemia with LDL goal <70/Obesity with a BMI of: Jarrod Mancera is on high intensity statin therapy with atorvastatin 80 mg daily.  Most recent LDL 81 on 9/9/21.     3.  Hypertension: His blood pressure is elevated at 150/68.  He reports his home blood pressure runs in 120s to 130s systolic.  He is on amlodipine, and losartan 50 mg daily.    4.  Bruit on right carotid artery per assessment: Patient reports feeling weak in his right arm and right leg which she thinks is due to MS.  Dr. Drummond has recommended bilateral carotid ultrasound.  Patient is interested.    Plan:  -Continue aspirin    -Increase amlodipine from 2.5 mg daily to 5 mg daily.  Encouraged to call if experiences lightheadedness, dizziness or any new side effects.    - Encouraged to seek medical attention if recurrent chest pain or shortness of breath.    - Risk factor modification and lifestyle management topics were discussed including managing comorbidities, heart healthy diet, exercise, stress reduction, alcohol use and drug use.       -  We discussed a diet low in saturated fat, weight loss, and exercise along with medication for better control of cholesterol.  Highly encouraged to participate in nutrition class in cardiac rehab.    -I will request Dr. Bhanu Bueno's nurse to help process the order for carotid ultrasound.    Follow up with Dr. Drummond as scheduled in November     History of Present Illness/Subjective    Mr. Jarrod Mancera is a 71 year old male with a past medical history of multiple sclerosis with most recent flareup in June 2021, pure hypercholesterolemia, hypothyroidism, ongoing intermittent chest discomfort and recent abnormal CTA coronary who is seen at [facility] for post coronary intervention follow up.     Patient saw Dr. Drummond in late August complaining of new vertebral chest discomfort with mixed of typical and atypical features.  He underwent CT coronary on 9/7/2021 showed calcium score of 833 with dense calcific plaque in ostial and proximal LAD which obscures the coronary lumen.  Also noted moderate to severe disease in mid LAD.  Patient underwent coronary angiogram showed mild to moderate disease.  He was recommended medical therapy.    Today, Jarrod reports that he is overall feeling better since recent coronary angiogram and was started on amlodipine.  However, he continues to have some weakness in his right arm and right leg which he thinks is due to multiple sclerosis.  He denies fatigue, lightheadedness, shortness of breath, dyspnea on exertion, orthopnea, PND, palpitations, chest pain, abdominal fullness/bloating and lower extremity edema.  He is interested in undergoing carotid ultrasound.  He does not appear any bleeding complications.  He continues to remain active and exercises regularly.    Coronary Angiogram done on 9/8/21- reviewed:  Conclusion       Mid LAD lesion is 55% stenosed.    Pressure wire/FFR was performed on the lesion. FFR: 0.81.    The left  ventricular ejection fraction is grossly normal.    Ost LM to Mid LM lesion is 15% stenosed.    Ost LAD to Prox LAD lesion is 40% stenosed.    RPDA lesion is 45% stenosed.     72 yo male with strong family hx of CAD and multiple sclerosis with abn CTA coronary arteries with fatigue and chest pressure     Noted prior to angiography baseline  to 184 mmHg     Angiography via left radial  LM mild ostial dz  LAD calcified prox 40-50% and mid 50-60% with FFR 0.81  Circ mild dz  RCA PDA diffuse 40%     LVEDP 7mmHg  LV gram normal EF no WMA noted mild LVH     Imp/plan  1. CAD - moderate to severe dz in mid LAD with boarderline FFR in the setting of fatigue but not exertional chest pain, would first trial medical therapy adding amlodipine 5mg daily for both HTN control and potential angina relief, noted LDL 80mg/dL, consider raising atorvastatin 80mg if no contraindications from having muscular dystrophy with goal LDL < 70mg/dL.  If no improvement or intolerance of amlodipine, would suggest return for IVUS and PCI with rotablator to mid LAD.  Will set up cardiac rehab for stable angina, [facility]         CTA Coronary Angiogram done on 9/7/21: Reviewed  Interpretation Summary    The total Agatston score is 833. A calcium score in this range places the individual in the 83rd percentile when compared to an age and gender matched control group.    Dense calcific plaque in the ostial and proximal LAD which obscures the coronary lumen. Cannot rule out the possiblity of severe stenosis.    Mixed calcific and noncalcific plaque in the mid LAD causing moderate-to-severe (60-70%) stenosis.        Physical Examination Review of Systems   BP (!) 150/68 (BP Location: Left arm, Patient Position: Sitting, Cuff Size: Adult Regular)   Pulse 68   Resp 16   Wt 70 kg (154 lb 6.4 oz)   BMI 23.48 kg/m    Body mass index is 23.48 kg/m .  Wt Readings from Last 3 Encounters:   09/17/21 70 kg (154 lb  6.4 oz)   09/09/21 68 kg (150 lb)   09/07/21 68 kg (150 lb)     General Appearance:   no distress, normal body habitus   ENT/Mouth: membranes moist, no oral lesions or bleeding gums.      EYES:  no scleral icterus, normal conjunctivae   Neck: no carotid bruits or thyromegaly   Chest/Lungs:   lungs are clear to auscultation, no rales or wheezing, equal chest wall expansion    Cardiovascular:   Heart rate regular. Normal first and second heart sounds with no murmurs, rubs, or gallops; the carotid, radial and posterior tibial pulses are intact, Jugular venous pressure flat with no edema bilaterally    Abdomen:  no organomegaly, masses, bruits, or tenderness; bowel sounds are present   Extremities  Puncture Site: no cyanosis or clubbing  Left radial site is soft with .  Radial pulses and Pedal pulses intact and symmetrical.  CMS intact.   Skin: no xanthelasma, warm.    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;     Family history of myocardial infarction      High cholesterol      Hypertension      Hypertension      MS (multiple sclerosis) (H)      Multiple sclerosis (H)     Past Surgical History:   Procedure Laterality Date     ARTHROSCOPY SHOULDER  1990    L Shoulder     CV CORONARY ANGIOGRAM N/A 9/9/2021    Procedure: Coronary Angiogram;  Surgeon: Placido Ang MD;  Location: [facility] CV     CV FRACTIONAL FLOW RATIO WIRE N/A 9/9/2021    Procedure: Fractional Flow Ratio Wire;  Surgeon: Placido Ang MD;  Location: [facility] LAB CV     CV LEFT VENTRICULOGRAM N/A 9/9/2021    Procedure: Left Ventriculogram;  Surgeon: Placido Ang MD;  Location: [facility] CATH LAB CV     HEAD & NECK SURGERY      Robotic Surgery @ [facility] 2011      HERNIA REPAIR  2006     KNEE SURGERY  1970    Family History   Problem Relation Age of Onset     Cancer No family hx of         no skin cancer     Acute Myocardial Infarction Mother      Acute Myocardial Infarction Father      CABG Sister     Social History     Socioeconomic History     Marital status:      Spouse name: Not on file     Number of children: Not on file     Years of education: Not on file     Highest education level: Not on file   Occupational History     Not on file   Tobacco Use     Smoking status: Former Smoker     Quit date: 1977     Years since quittin.1     Smokeless tobacco: Never Used   Substance and Sexual Activity     Alcohol use: Not on file     Drug use: Not on file     Sexual activity: Not on file   Other Topics Concern     Parent/sibling w/ CABG, MI or angioplasty before 65F 55M? Not Asked   Social History Narrative     Not on file     Social Determinants of Health     Financial Resource Strain:      Difficulty of Paying Living Expenses:    Food Insecurity:      Worried About Running Out of Food in the Last Year:      Ran Out of Food in the Last Year:    Transportation Needs:      Lack of Transportation (Medical):      Lack of Transportation (Non-Medical):    Physical Activity:      Days of Exercise per Week:      Minutes of Exercise per Session:    Stress:      Feeling of Stress :    Social Connections:      Frequency of Communication with Friends and Family:      Frequency of Social Gatherings with Friends and Family:      Attends [employer] Services:      Active Member of Clubs or Organizations:      Attends Club or Organization Meetings:      Marital Status:    Intimate Partner Violence:      Fear of Current or Ex-Partner:      Emotionally Abused:      Physically Abused:      Sexually Abused:           Medications  Allergies   Current Outpatient Medications   Medication Sig Dispense Refill     amLODIPine (NORVASC) 5 MG tablet Take 1 tablet (5 mg) by mouth daily 90  tablet 3     aspirin 325 MG tablet Take 1 tablet by mouth daily. 100 tablet 3     atorvastatin (LIPITOR) 80 MG tablet Take 1 tablet (80 mg) by mouth daily 30 tablet 11     baclofen (LIORESAL) 10 MG tablet Take 1 tablet by mouth 3 times daily       cholecalciferol (VITAMIN-D) 1000 UNIT capsule Take 1 capsule by mouth daily. 90 capsule 3     citalopram (CELEXA) 20 MG tablet Take 2 tablets by mouth daily. 180 tablet 1     gabapentin (NEURONTIN) 300 MG capsule Take 2 capsules by mouth 3 times daily       glatiramer (COPAXONE) 20 MG/ML injection Inject 40 mg Subcutaneous three times a week  1 each 12     levothyroxine (LEVOTHROID) 125 MCG tablet Take 1 tablet by mouth daily.       losartan (COZAAR) 50 MG tablet Take 1 tablet by mouth daily. 90 tablet 3     Multiple Vitamin (MULTIVITAMIN) per tablet Take 1 tablet by mouth daily. 100 tablet 12     ANTIPLATELET MEDICATION NOT PRESCRIBED, INTENTIONAL, Antiplatelet medication not prescribed intentionally due to not indicated (Patient not taking: Reported on 9/17/2021)      Allergies   Allergen Reactions     Codeine Sulfate Rash         Lab Results    Chemistry/lipid CBC Cardiac Enzymes/BNP/TSH/INR   Lab Results   Component Value Date    CHOL 138 09/09/2021    HDL 44 09/09/2021    TRIG 66 09/09/2021    BUN 19 09/09/2021     09/09/2021    CO2 24 09/09/2021    Lab Results   Component Value Date    WBC 6.1 09/09/2021    HGB 14.0 09/09/2021    HCT 41.7 09/09/2021    MCV 97 09/09/2021     09/09/2021    Lab Results   Component Value Date    TSH 6.97 (A) 08/02/2012        50  minutes spent on the date of encounter doing chart review, review of test results, interpretation with above tests, patient visit, documentation and discussion with other provider.        This note has been dictated using voice recognition software. Any grammatical, typographical, or context distortions are unintentional and inherent to the software          Thank you for allowing me to participate  in the care of your patient.      Sincerely,     DALLAS Villanueva [facility]  cc:   No referring provider defined for this encounter.

## 2021-09-20 DIAGNOSIS — R09.89 BRUIT OF RIGHT CAROTID ARTERY: Primary | ICD-10-CM

## 2021-09-22 ENCOUNTER — AMBULATORY - RIVER FALLS (OUTPATIENT)
Dept: FAMILY MEDICINE | Facility: CLINIC | Age: 71
End: 2021-09-22
Payer: COMMERCIAL

## 2021-09-23 LAB
25(OH)D3 SERPL-MCNC: 62 NG/ML (ref 30–100)
BUN SERPL-MCNC: 22 MG/DL (ref 7–25)
BUN/CREAT RATIO - HISTORICAL: ABNORMAL (ref 6–22)
CALCIUM SERPL-MCNC: 9.9 MG/DL (ref 8.6–10.3)
CHLORIDE BLD-SCNC: 101 MMOL/L (ref 98–110)
CO2 SERPL-SCNC: 29 MMOL/L (ref 20–32)
CREAT SERPL-MCNC: 1.21 MG/DL (ref 0.7–1.25)
EGFRCR SERPLBLD CKD-EPI 2021: 61 ML/MIN/1.73M2
GLUCOSE BLD-MCNC: 108 MG/DL (ref 65–99)
POTASSIUM BLD-SCNC: 5.1 MMOL/L (ref 3.5–5.3)
PSA SERPL-MCNC: 1.5 NG/ML
SODIUM SERPL-SCNC: 139 MMOL/L (ref 135–146)
TSH SERPL DL<=0.005 MIU/L-ACNC: 0.16 MIU/L (ref 0.4–4.5)

## 2021-09-24 ENCOUNTER — HOSPITAL ENCOUNTER (OUTPATIENT)
Dept: ULTRASOUND IMAGING | Facility: CLINIC | Age: 71
Discharge: HOME | End: 2021-09-24
Attending: INTERNAL MEDICINE | Admitting: INTERNAL MEDICINE
Payer: MEDICARE

## 2021-09-24 ENCOUNTER — COMMUNICATION - RIVER FALLS (OUTPATIENT)
Dept: FAMILY MEDICINE | Facility: CLINIC | Age: 71
End: 2021-09-24
Payer: COMMERCIAL

## 2021-09-24 DIAGNOSIS — R09.89 BRUIT OF RIGHT CAROTID ARTERY: ICD-10-CM

## 2021-09-24 PROCEDURE — 93880 EXTRACRANIAL BILAT STUDY: CPT

## 2021-09-29 ENCOUNTER — COMMUNICATION - RIVER FALLS (OUTPATIENT)
Dept: FAMILY MEDICINE | Facility: CLINIC | Age: 71
End: 2021-09-29
Payer: COMMERCIAL

## 2021-10-08 ENCOUNTER — OFFICE VISIT - RIVER FALLS (OUTPATIENT)
Dept: FAMILY MEDICINE | Facility: CLINIC | Age: 71
End: 2021-10-08
Payer: COMMERCIAL

## 2021-10-25 ENCOUNTER — AMBULATORY - RIVER FALLS (OUTPATIENT)
Dept: FAMILY MEDICINE | Facility: CLINIC | Age: 71
End: 2021-10-25
Payer: COMMERCIAL

## 2021-11-03 ENCOUNTER — OFFICE VISIT - RIVER FALLS (OUTPATIENT)
Dept: FAMILY MEDICINE | Facility: CLINIC | Age: 71
End: 2021-11-03

## 2021-11-03 ENCOUNTER — OFFICE VISIT (OUTPATIENT)
Dept: CARDIOLOGY | Facility: TELEHEALTH | Age: 71
End: 2021-11-03
Payer: COMMERCIAL

## 2021-11-03 VITALS
OXYGEN SATURATION: 95 % | DIASTOLIC BLOOD PRESSURE: 78 MMHG | SYSTOLIC BLOOD PRESSURE: 142 MMHG | BODY MASS INDEX: 23.12 KG/M2 | WEIGHT: 152 LBS | HEART RATE: 56 BPM

## 2021-11-03 DIAGNOSIS — E78.00 PURE HYPERCHOLESTEROLEMIA: ICD-10-CM

## 2021-11-03 DIAGNOSIS — I25.119 CORONARY ARTERY DISEASE INVOLVING NATIVE CORONARY ARTERY OF NATIVE HEART WITH ANGINA PECTORIS (H): Primary | ICD-10-CM

## 2021-11-03 PROCEDURE — 99213 OFFICE O/P EST LOW 20 MIN: CPT | Performed by: INTERNAL MEDICINE

## 2021-11-03 RX ORDER — LEVOTHYROXINE SODIUM 112 UG/1
TABLET ORAL
COMMUNITY
Start: 2021-10-08 | End: 2022-09-19

## 2021-11-03 NOTE — PROGRESS NOTES
[facility]  [phone]          Assessment/Recommendations   Patient with known coronary artery disease with recent coronary angiogram showing moderate disease with no flow-limiting lesions.  Worse stenosis was 55% in the left anterior descending coronary artery.    He is on a good medical regimen.  Blood pressures at home are 119/67 this morning his blood pressure cuff checked out today and correlated with ours.  He also has some lightheadedness so I will not increase his amlodipine because his blood pressures at home are at goal.    His LDL cholesterol is a little bit above the goal and he is going to try harder and work with the cardiac rehab people on dietary changes and recheck in 6 months.  We could consider adding Zetia to his medical regimen as well in the future.    I have encouraged him to maintain an active lifestyle and to continue exercising on a regular basis after cardiac rehab is completed.    Thank you for allowing us to participate in his care.       History of Present Illness/Subjective    Mr. Jarrod Mancera is a 71 year old male with known coronary artery disease with recent angiogram showing moderate disease as noted above.  He has been feeling well.  He is not having any angina and is going to cardiac rehab on a regular basis here in [place].  He denies unusual shortness of breath on the days he does not go to rehab he does weight lifting.  His LDL cholesterol was just above 80.  Blood pressures at home of been 119/67 this morning and he brought his cuff in and it correlates very nicely with the measurement that we obtained here.    ECG: Personally reviewed. {       Physical Examination Review of Systems   BP (!) 142/78   Pulse 56   Wt 68.9 kg (152 lb)   SpO2 95%   BMI 23.12 kg/m    Body mass index is 23.12 kg/m .  Wt Readings from Last 3 Encounters:   11/03/21 68.9 kg (152 lb)   09/17/21 70 kg (154 lb 6.4 oz)   09/09/21 68 kg (150 lb)     General Appearance:    Alert, cooperative and in no acute distress.   ENT/Mouth: Patient wearing a mask.      EYES:  no scleral icterus, normal conjunctivae   Neck: JVP normal. No Hepatojugular reflux. Thyroid not visualized.   Chest/Lungs:   Lungs are clear to auscultation, equal chest wall expansion.   Cardiovascular:   S1, S2 without murmur ,clicks or rubs. Brachial, radial and posterior tibial pulses are intact and symetric. No carotid bruits noted   Abdomen:  Nontender. BS+.    Extremities: No cyanosis, clubbing or edema   Skin: no xanthelasma, warm.    Neurologic: normal arm movement bilateral, no tremors     Psychiatric: Appropriate affect.      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;     Family history of myocardial infarction      High cholesterol      Hypertension      Hypertension      MS (multiple sclerosis) (H)      Multiple sclerosis (H)     Past Surgical History:   Procedure Laterality Date     ARTHROSCOPY SHOULDER  1990    L Shoulder     CV CORONARY ANGIOGRAM N/A 9/9/2021    Procedure: Coronary Angiogram;  Surgeon: Placido Ang MD;  Location: [facility] CATH LAB CV     CV FRACTIONAL FLOW RATIO WIRE N/A 9/9/2021    Procedure: Fractional Flow Ratio Wire;  Surgeon: Placido Ang MD;  Location: [facility] CATH LAB CV     CV LEFT VENTRICULOGRAM N/A 9/9/2021    Procedure: Left Ventriculogram;  Surgeon: Placido Ang MD;  Location: [facility] CATH LAB CV     HEAD & NECK SURGERY      Robotic Surgery @ [place] 2011     HERNIA REPAIR  2006     KNEE SURGERY  1970    Family History   Problem Relation Age of Onset     Cancer No family hx of         no skin cancer     Acute Myocardial Infarction Mother      Acute Myocardial Infarction Father      CABG Sister     Social History      Socioeconomic History     Marital status:      Spouse name: Not on file     Number of children: Not on file     Years of education: Not on file     Highest education level: Not on file   Occupational History     Not on file   Tobacco Use     Smoking status: Former Smoker     Quit date: 1977     Years since quittin.2     Smokeless tobacco: Never Used   Substance and Sexual Activity     Alcohol use: Not on file     Drug use: Not on file     Sexual activity: Not on file   Other Topics Concern     Parent/sibling w/ CABG, MI or angioplasty before 65F 55M? Not Asked   Social History Narrative     Not on file     Social Determinants of Health     Financial Resource Strain:      Difficulty of Paying Living Expenses:    Food Insecurity:      Worried About Running Out of Food in the Last Year:      Ran Out of Food in the Last Year:    Transportation Needs:      Lack of Transportation (Medical):      Lack of Transportation (Non-Medical):    Physical Activity:      Days of Exercise per Week:      Minutes of Exercise per Session:    Stress:      Feeling of Stress :    Social Connections:      Frequency of Communication with Friends and Family:      Frequency of Social Gatherings with Friends and Family:      Attends Jainism Services:      Active Member of Clubs or Organizations:      Attends Club or Organization Meetings:      Marital Status:    Intimate Partner Violence:      Fear of Current or Ex-Partner:      Emotionally Abused:      Physically Abused:      Sexually Abused:           Medications  Allergies   Current Outpatient Medications   Medication Sig Dispense Refill     amLODIPine (NORVASC) 5 MG tablet Take 1 tablet (5 mg) by mouth daily 90 tablet 3     aspirin 325 MG tablet Take 1 tablet by mouth daily. 100 tablet 3     atorvastatin (LIPITOR) 80 MG tablet Take 1 tablet (80 mg) by mouth daily 30 tablet 11     baclofen (LIORESAL) 10 MG tablet Take 1 tablet by mouth 3 times daily       citalopram  (CELEXA) 20 MG tablet Take 2 tablets by mouth daily. 180 tablet 1     gabapentin (NEURONTIN) 300 MG capsule Take 2 capsules by mouth 3 times daily       glatiramer (COPAXONE) 20 MG/ML injection Inject 40 mg Subcutaneous three times a week  1 each 12     levothyroxine (SYNTHROID/LEVOTHROID) 112 MCG tablet        losartan (COZAAR) 50 MG tablet Take 1 tablet by mouth daily. 90 tablet 3     Multiple Vitamin (MULTIVITAMIN) per tablet Take 1 tablet by mouth daily. 100 tablet 12     ANTIPLATELET MEDICATION NOT PRESCRIBED, INTENTIONAL, Antiplatelet medication not prescribed intentionally due to not indicated (Patient not taking: Reported on 9/17/2021)       cholecalciferol (VITAMIN-D) 1000 UNIT capsule Take 1 capsule by mouth daily. 90 capsule 3    Allergies   Allergen Reactions     Codeine Sulfate Rash         Lab Results    Chemistry/lipid CBC Cardiac Enzymes/BNP/TSH/INR   Lab Results   Component Value Date    CHOL 138 09/09/2021    HDL 44 09/09/2021    TRIG 66 09/09/2021    BUN 19 09/09/2021     09/09/2021    CO2 24 09/09/2021    Lab Results   Component Value Date    WBC 6.1 09/09/2021    HGB 14.0 09/09/2021    HCT 41.7 09/09/2021    MCV 97 09/09/2021     09/09/2021    Lab Results   Component Value Date    TSH 6.97 (A) 08/02/2012

## 2021-11-03 NOTE — LETTER
11/3/2021    Danyel Cortez MD  [facility] [address] [facility] [address]    RE: Jarrod Mancera       Dear Colleague,    I had the pleasure of seeing Jarrod Mancera in the [facility].      [facility]  [phone]          Assessment/Recommendations   Patient with known coronary artery disease with recent coronary angiogram showing moderate disease with no flow-limiting lesions.  Worse stenosis was 55% in the left anterior descending coronary artery.    He is on a good medical regimen.  Blood pressures at home are 119/67 this morning his blood pressure cuff checked out today and correlated with ours.  He also has some lightheadedness so I will not increase his amlodipine because his blood pressures at home are at goal.    His LDL cholesterol is a little bit above the goal and he is going to try harder and work with the cardiac rehab people on dietary changes and recheck in 6 months.  We could consider adding Zetia to his medical regimen as well in the future.    I have encouraged him to maintain an active lifestyle and to continue exercising on a regular basis after cardiac rehab is completed.    Thank you for allowing us to participate in his care.       History of Present Illness/Subjective    Mr. Jarrod Mancera is a 71 year old male with known coronary artery disease with recent angiogram showing moderate disease as noted above.  He has been feeling well.  He is not having any angina and is going to cardiac rehab on a regular basis here in [place].  He denies unusual shortness of breath on the days he does not go to rehab he does weight lifting.  His LDL cholesterol was just above 80.  Blood pressures at home of been 119/67 this morning and he brought his cuff in and it correlates very nicely with the measurement that we obtained here.    ECG: Personally reviewed. {       Physical Examination Review of Systems   BP (!)  142/78   Pulse 56   Wt 68.9 kg (152 lb)   SpO2 95%   BMI 23.12 kg/m    Body mass index is 23.12 kg/m .  Wt Readings from Last 3 Encounters:   11/03/21 68.9 kg (152 lb)   09/17/21 70 kg (154 lb 6.4 oz)   09/09/21 68 kg (150 lb)     General Appearance:   Alert, cooperative and in no acute distress.   ENT/Mouth: Patient wearing a mask.      EYES:  no scleral icterus, normal conjunctivae   Neck: JVP normal. No Hepatojugular reflux. Thyroid not visualized.   Chest/Lungs:   Lungs are clear to auscultation, equal chest wall expansion.   Cardiovascular:   S1, S2 without murmur ,clicks or rubs. Brachial, radial and posterior tibial pulses are intact and symetric. No carotid bruits noted   Abdomen:  Nontender. BS+.    Extremities: No cyanosis, clubbing or edema   Skin: no xanthelasma, warm.    Neurologic: normal arm movement bilateral, no tremors     Psychiatric: Appropriate affect.      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;     Family history of myocardial infarction      High cholesterol      Hypertension      Hypertension      MS (multiple sclerosis) (H)      Multiple sclerosis (H)     Past Surgical History:   Procedure Laterality Date     ARTHROSCOPY SHOULDER  1990    L Shoulder     CV CORONARY ANGIOGRAM N/A 9/9/2021    Procedure: Coronary Angiogram;  Surgeon: Placdio Ang MD;  Location: [facility] CV     CV FRACTIONAL FLOW RATIO WIRE N/A 9/9/2021    Procedure: Fractional Flow Ratio Wire;  Surgeon: Placido Ang MD;  Location: [facility] CV     CV LEFT VENTRICULOGRAM N/A 9/9/2021    Procedure: Left Ventriculogram;  Surgeon: Placido Ang MD;  Location: [facility] LAB CV     HEAD & NECK SURGERY      Robotic Surgery @ [place] 2011      HERNIA REPAIR  2006     KNEE SURGERY  1970    Family History   Problem Relation Age of Onset     Cancer No family hx of         no skin cancer     Acute Myocardial Infarction Mother      Acute Myocardial Infarction Father      CABG Sister     Social History     Socioeconomic History     Marital status:      Spouse name: Not on file     Number of children: Not on file     Years of education: Not on file     Highest education level: Not on file   Occupational History     Not on file   Tobacco Use     Smoking status: Former Smoker     Quit date: 1977     Years since quittin.2     Smokeless tobacco: Never Used   Substance and Sexual Activity     Alcohol use: Not on file     Drug use: Not on file     Sexual activity: Not on file   Other Topics Concern     Parent/sibling w/ CABG, MI or angioplasty before 65F 55M? Not Asked   Social History Narrative     Not on file     Social Determinants of Health     Financial Resource Strain:      Difficulty of Paying Living Expenses:    Food Insecurity:      Worried About Running Out of Food in the Last Year:      Ran Out of Food in the Last Year:    Transportation Needs:      Lack of Transportation (Medical):      Lack of Transportation (Non-Medical):    Physical Activity:      Days of Exercise per Week:      Minutes of Exercise per Session:    Stress:      Feeling of Stress :    Social Connections:      Frequency of Communication with Friends and Family:      Frequency of Social Gatherings with Friends and Family:      Attends Christianity Services:      Active Member of Clubs or Organizations:      Attends Club or Organization Meetings:      Marital Status:    Intimate Partner Violence:      Fear of Current or Ex-Partner:      Emotionally Abused:      Physically Abused:      Sexually Abused:           Medications  Allergies   Current Outpatient Medications   Medication Sig Dispense Refill     amLODIPine (NORVASC) 5 MG tablet Take 1 tablet (5 mg) by mouth daily 90  tablet 3     aspirin 325 MG tablet Take 1 tablet by mouth daily. 100 tablet 3     atorvastatin (LIPITOR) 80 MG tablet Take 1 tablet (80 mg) by mouth daily 30 tablet 11     baclofen (LIORESAL) 10 MG tablet Take 1 tablet by mouth 3 times daily       citalopram (CELEXA) 20 MG tablet Take 2 tablets by mouth daily. 180 tablet 1     gabapentin (NEURONTIN) 300 MG capsule Take 2 capsules by mouth 3 times daily       glatiramer (COPAXONE) 20 MG/ML injection Inject 40 mg Subcutaneous three times a week  1 each 12     levothyroxine (SYNTHROID/LEVOTHROID) 112 MCG tablet        losartan (COZAAR) 50 MG tablet Take 1 tablet by mouth daily. 90 tablet 3     Multiple Vitamin (MULTIVITAMIN) per tablet Take 1 tablet by mouth daily. 100 tablet 12     ANTIPLATELET MEDICATION NOT PRESCRIBED, INTENTIONAL, Antiplatelet medication not prescribed intentionally due to not indicated (Patient not taking: Reported on 9/17/2021)       cholecalciferol (VITAMIN-D) 1000 UNIT capsule Take 1 capsule by mouth daily. 90 capsule 3    Allergies   Allergen Reactions     Codeine Sulfate Rash         Lab Results    Chemistry/lipid CBC Cardiac Enzymes/BNP/TSH/INR   Lab Results   Component Value Date    CHOL 138 09/09/2021    HDL 44 09/09/2021    TRIG 66 09/09/2021    BUN 19 09/09/2021     09/09/2021    CO2 24 09/09/2021    Lab Results   Component Value Date    WBC 6.1 09/09/2021    HGB 14.0 09/09/2021    HCT 41.7 09/09/2021    MCV 97 09/09/2021     09/09/2021    Lab Results   Component Value Date    TSH 6.97 (A) 08/02/2012

## 2021-11-07 ENCOUNTER — HEALTH MAINTENANCE LETTER (OUTPATIENT)
Age: 71
End: 2021-11-07

## 2022-02-11 VITALS
WEIGHT: 152 LBS | TEMPERATURE: 96.2 F | BODY MASS INDEX: 23.04 KG/M2 | HEART RATE: 60 BPM | HEIGHT: 68 IN | SYSTOLIC BLOOD PRESSURE: 136 MMHG | DIASTOLIC BLOOD PRESSURE: 78 MMHG

## 2022-02-11 VITALS
DIASTOLIC BLOOD PRESSURE: 71 MMHG | WEIGHT: 149 LBS | BODY MASS INDEX: 22.58 KG/M2 | HEART RATE: 52 BPM | SYSTOLIC BLOOD PRESSURE: 117 MMHG | HEART RATE: 60 BPM | SYSTOLIC BLOOD PRESSURE: 119 MMHG | HEART RATE: 57 BPM | WEIGHT: 149 LBS | BODY MASS INDEX: 22.58 KG/M2 | DIASTOLIC BLOOD PRESSURE: 89 MMHG | HEIGHT: 68 IN | HEIGHT: 68 IN | SYSTOLIC BLOOD PRESSURE: 145 MMHG | BODY MASS INDEX: 22.58 KG/M2 | DIASTOLIC BLOOD PRESSURE: 72 MMHG | HEIGHT: 68 IN | WEIGHT: 149 LBS

## 2022-02-11 VITALS
TEMPERATURE: 98.7 F | SYSTOLIC BLOOD PRESSURE: 156 MMHG | OXYGEN SATURATION: 98 % | DIASTOLIC BLOOD PRESSURE: 91 MMHG | BODY MASS INDEX: 23.53 KG/M2 | HEART RATE: 65 BPM | WEIGHT: 154.7 LBS

## 2022-02-11 VITALS
HEIGHT: 68 IN | HEIGHT: 68 IN | HEART RATE: 43 BPM | DIASTOLIC BLOOD PRESSURE: 78 MMHG | TEMPERATURE: 96.2 F | TEMPERATURE: 97 F | BODY MASS INDEX: 23.04 KG/M2 | DIASTOLIC BLOOD PRESSURE: 79 MMHG | WEIGHT: 152 LBS | HEART RATE: 62 BPM | SYSTOLIC BLOOD PRESSURE: 144 MMHG | BODY MASS INDEX: 23.11 KG/M2 | SYSTOLIC BLOOD PRESSURE: 122 MMHG

## 2022-02-11 VITALS
WEIGHT: 154.6 LBS | HEART RATE: 56 BPM | BODY MASS INDEX: 23.43 KG/M2 | HEIGHT: 68 IN | SYSTOLIC BLOOD PRESSURE: 132 MMHG | TEMPERATURE: 97 F | DIASTOLIC BLOOD PRESSURE: 80 MMHG

## 2022-02-11 VITALS
HEART RATE: 68 BPM | SYSTOLIC BLOOD PRESSURE: 128 MMHG | WEIGHT: 155.4 LBS | HEIGHT: 68 IN | BODY MASS INDEX: 23.55 KG/M2 | DIASTOLIC BLOOD PRESSURE: 70 MMHG | TEMPERATURE: 97.6 F

## 2022-02-11 VITALS
SYSTOLIC BLOOD PRESSURE: 132 MMHG | HEART RATE: 60 BPM | WEIGHT: 152 LBS | HEART RATE: 51 BPM | BODY MASS INDEX: 23.04 KG/M2 | BODY MASS INDEX: 22.58 KG/M2 | HEIGHT: 68 IN | TEMPERATURE: 96 F | WEIGHT: 149 LBS | DIASTOLIC BLOOD PRESSURE: 82 MMHG | RESPIRATION RATE: 16 BRPM | HEIGHT: 68 IN | DIASTOLIC BLOOD PRESSURE: 76 MMHG | SYSTOLIC BLOOD PRESSURE: 139 MMHG

## 2022-02-11 VITALS
DIASTOLIC BLOOD PRESSURE: 80 MMHG | HEIGHT: 68 IN | BODY MASS INDEX: 22.58 KG/M2 | SYSTOLIC BLOOD PRESSURE: 126 MMHG | OXYGEN SATURATION: 99 % | WEIGHT: 149 LBS | HEART RATE: 60 BPM | TEMPERATURE: 98.1 F

## 2022-02-16 NOTE — TELEPHONE ENCOUNTER
"---------------------  From: Kalyani Booth LPN (MARIA ESTHER Message [employer] ([id number]_[facility])   )   To: Hao Domingo MD;     Sent: 5/4/2020 9:50:42 AM CDT  Subject: FW: General Message-requesting a call     Jarrod called last week wanting to speak about wife. Was told you do not have permission to speak to him about his wife.    She has a phone visit scheduled for today.     Please advise      ---------------------  From: Linnea Lucas CMA (Phone Messages Pool ([id number]_[facility]))   To: MARIA ESTHER Message Pool ([id number]_[facility])   ;     Sent: 5/4/2020 9:43:16 AM CDT  Subject: General Message-requesting a call     Phone Message    PCP:   MARIA ESTHER      Time of Call:  0920       Person Calling:  Jaren Mancera  Phone number:  [phone]    Returned call at: _    Note:   Jaren requesting a call back from [facility] - states \"he will be much nicer than last week\"  "

## 2022-02-16 NOTE — PROGRESS NOTES
Chief Complaint    sleep study results  History of Present Illness      For follow-up of a home sleep test.  He has had chronic heroic snoring.  Has had MS since 1990.  Head and neck cancer couple of years ago.  Chronic daytime sleepiness.  Witnessed apneas.  History of hypertension.  Eppsworth score is 7.  Review of Systems      Patient has nocturia once per night.  No heartburn or morning headaches.  No chest pain, dyspnea, edema, swallowing problems.  His MS is primarily manifested in some right-sided numbness generalized unsteadiness.  This is been very stable.  Physical Exam   Vitals & Measurements    HR: 60(Peripheral)  BP: 119/71       Patient is a healthy-appearing man in no distress.  Alert and oriented.  HEENT exam reveals a Mallampati1.  Neck reveals postoperative changes on the left.  Chest clear.  Heart exam regular.  No edema.  Cranial nerves normal.  Assessment/Plan       H/O laryngeal cancer(Z85.21)        Status post surgical cure.         Orders:          [id number] office outpatient new 30 minutes (Charge), Quantity: 1, Obstructive sleep apnea syndrome, moderate  Hypothyroid  MS (multiple sclerosis)  H/O laryngeal cancer          Referral (Request), 08/31/18 9:17:00 CDT, Referred to: Other, Referred to: [facility], Reason for referral: Polysomnogram for CPAP titration for ELODIA by HST, Obstructive sleep apnea syndrome, moderate  Hypothyroid  MS (multiple sclerosis)  H/O laryngeal cancer       Hypothyroid(E03.4)        On replacement.         Orders:          [id number] office outpatient new 30 minutes (Charge), Quantity: 1, Obstructive sleep apnea syndrome, moderate  Hypothyroid  MS (multiple sclerosis)  H/O laryngeal cancer          Referral (Request), 08/31/18 9:17:00 CDT, Referred to: Other, Referred to: [facility], Reason for referral: Polysomnogram for CPAP titration for ELODIA by HST, Obstructive sleep apnea syndrome, moderate  Hypothyroid  MS (multiple sclerosis)  H/O laryngeal  cancer       MS (multiple sclerosis)(G35)        Not progressive.         Orders:          [id number] office outpatient new 30 minutes (Charge), Quantity: 1, Obstructive sleep apnea syndrome, moderate  Hypothyroid  MS (multiple sclerosis)  H/O laryngeal cancer          Referral (Request), 08/31/18 9:17:00 CDT, Referred to: Other, Referred to: [facility] [place], Reason for referral: Polysomnogram for CPAP titration for ELODIA by HST, Obstructive sleep apnea syndrome, moderate  Hypothyroid  MS (multiple sclerosis)  H/O laryngeal cancer       Obstructive sleep apnea syndrome, moderate(G47.33)        Moderate symptomatic obstructive sleep apnea.  Discussed treatment options and further diagnostic measures.  We decided to proceed with CPAP titration due to his history of neurologic disease and had no cancer.         Orders:          [id number] office outpatient new 30 minutes (Charge), Quantity: 1, Obstructive sleep apnea syndrome, moderate  Hypothyroid  MS (multiple sclerosis)  H/O laryngeal cancer          Referral (Request), 08/31/18 9:17:00 CDT, Referred to: Other, Referred to: [facility] [place], Reason for referral: Polysomnogram for CPAP titration for ELODIA by HST, Obstructive sleep apnea syndrome, moderate  Hypothyroid  MS (multiple sclerosis)  H/O laryngeal cancer  Patient Information     Name:TIGRE VARELA      Address:      [address] [place] [address]       Sex:Male      YOB: 1950      [phone]      Emergency Contact:JUAN M VARELA     MRN:[id number]     FIN:[id number]     Location:[facility]     Date of Service:08/31/2018      Primary Care Physician:       Eloy Cortez MD, [phone]      Attending Physician:       Mohsen Goldman MD, [phone]  Problem List/Past Medical History    Ongoing     H/O laryngeal cancer     Hyperlipidemia     Hyperlipidemia     Hypothyroid     MS (multiple sclerosis)       Comments: The MS is followed by Dr. Noriega. MS with dystonic seizures  diagnosed in 1991. Following an episode of shingles, he had severe exacerbation of MS that lead to a 2-week hospitalization in 1999. [facility] last flare-up was during a trip to [facility] in April of 2003.     Obstructive sleep apnea syndrome, moderate       Comments: On HST AHI 24 and oximetry maxine 80%.     Raynauds syndrome     Seizures    Historical     *Hospitalized@[facility] - Pneumonia, probable aspiration     Bicipital tendinitis of left shoulder       Comments: Left shoulder surgery in 1990.     Fx left ankle       Comments: Distal fibular fracture of the right ankle, healed.     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        Multiple Vitamins oral tablet: 1 tab(s), PO, Daily.        aspirin 325 mg oral tablet: 325 mg, 1 tab(s), PO, Daily.        Jobst Facioplasty Garment: See Instructions, use as directed, 1 EA.        nitroglycerin 0.4 mg sublingual tablet: 0.4 mg, 1 tab(s), SL, q5min, 10 tab(s), PRN: for chest pain.        baclofen 10 mg oral tablet: 20 mg, 2 tab(s), PO, tid, 540 tab(s), 3 Refill(s).        gabapentin 300 mg oral capsule: 300 mg, 1 cap(s), PO, TID, 90 cap(s), 0 Refill(s).        Vitamin D3 1000 intl units oral capsule: 3,000 International Unit, 3 cap(s), po, daily, 270 cap(s), 0 Refill(s).        citalopram 20 mg oral tablet: 20 mg, 1 tab(s), PO, Daily, 90 tab(s), 1 Refill(s).        Metoprolol Succinate  mg oral tablet, extended release: 50 mg, 0.5 tab(s), po, daily, 45 tab(s), 3 Refill(s).        levothyroxine 125 mcg (0.125 mg)  oral tablet: 125 mcg, 1 tab(s), po, daily, 90 tab(s), 3 Refill(s).        atorvastatin 80 mg oral tablet: 80 mg, 1 tab(s), po, daily, 90 tab(s), 3 Refill(s).        losartan 50 mg oral tablet: 75 mg, 1.5 tab(s), po, daily, 45 tab(s), 1 Refill(s).                Allergies    codeine (Hives)  Social History    Smoking Status - 08/31/2018     Former smoker     Alcohol      Current, Beer (12 oz), 6 times per week, 1 drinks/episode average., 10/16/2017     Employment and Education      Employed, Work/School description: ., 09/26/2015     Exercise and Physical Activity      Exercise frequency: 6-7 x/week. Exercise type: Weight lifting, treadmill., 11/02/2016     Home and Environment      Marital status: . Spouse/Partner name: Sara Mancera., 09/26/2015     Sexual      Sexually active: Yes. Sexual orientation: Heterosexual., 09/26/2015     Substance Abuse      Past, Marijuana, 08/08/2012     Tobacco      Past, 5 year(s)., 09/04/2013  Family History    ASHD - Atherosclerotic heart disease: Mother and Sister.    Angioplasty: Sister (Dx at 51 years) and Sister (Dx at 54 years).    CA - Cancer of ovary: Sister.    CABG - Coronary artery bypass graft: Mother.    Gallbladder: Mother.    Gallbladder disease: Mother.    Heart disease: Mother, Sister and Sister.    MI: Father.    Sister: History is negative  Immunizations      Vaccine Date Status Comments      influenza virus vaccine, inactivated 10/11/2017 Given      pneumococcal (PCV13) 10/11/2017 Given      influenza virus vaccine, inactivated 10/05/2016 Given      influenza virus vaccine, inactivated 09/14/2015 Given      influenza virus vaccine, inactivated 09/14/2015 Given      influenza virus vaccine, inactivated 08/29/2014 Given      tetanus/diphth/pertuss (Tdap) adult/adol 08/29/2014 Given      ZOS, shingles 09/04/2013 Given      influenza virus vaccine, inactivated 09/04/2013 Given      influenza virus vaccine, inactivated 10/04/2011 Given  [10/4/2011] Ok per Dr. Drummond.      pneumococcal (PPSV23) 07/29/2011 Recorded      influenza 10/08/2010 Given      influenza, H1N1, inactivated 11/25/2009 Recorded      Hep A 02/21/2003 Recorded      Td 02/01/2003 Recorded  Lab Results          Lab Results (Last 4 results within 90 days)           Sodium Level: 140 mmol/L [135 mmol/L - 146 mmol/L] (08/09/18 13:55:00)          Potassium Level: 4.6 mmol/L [3.5 mmol/L - 5.3 mmol/L] (08/09/18 13:55:00)          Chloride Level: 104 mmol/L [98 mmol/L - 110 mmol/L] (08/09/18 13:55:00)          CO2 Level: 30 mmol/L [20 mmol/L - 32 mmol/L] (08/09/18 13:55:00)          Glucose Level: 95 mg/dL [65 mg/dL - 99 mg/dL] (08/09/18 13:55:00)          BUN: 20 mg/dL [7 mg/dL - 25 mg/dL] (08/09/18 13:55:00)          Creatinine Level: 1.13 mg/dL [0.7 mg/dL - 1.25 mg/dL] (08/09/18 13:55:00)          BUN/Creat Ratio: NOT APPLICABLE [6  - 22] (08/09/18 13:55:00)          eGFR: 67 mL/min/1.73m2 [8.6 mg/dL - 10.3 mg/dL] (08/09/18 13:55:00)          eGFR African American: 78 mL/min/1.73m2 [6  - 22] (08/09/18 13:55:00)          Calcium Level: 9.8 mg/dL [8.6 mg/dL - 10.3 mg/dL] (08/09/18 13:55:00)          TSH: 0.3 mIU/L Low [0.4 mIU/L - 4.5 mIU/L] (08/09/18 13:55:00)          PSA: 1.3 ng/mL [3.5 mmol/L - 5.3 mmol/L] (08/09/18 13:55:00)  Diagnostic Results   Home sleep test revealed moderate obstructive sleep apnea with AHI of 2480% on August 21, 2018

## 2022-02-16 NOTE — TELEPHONE ENCOUNTER
Entered by Zuleika Trujillo on October 16, 2019 12:21:46 PM CDT  Patient notified and transferred to scheduling.       ---------------------  From: Mohsen Goldman MD   To: [facility] Message Pool ([id number] [facility] [place]);     Sent: 10/16/2019 11:45:17 AM CDT  Subject: General Message     Needs a preop visit before colonoscopy with MAC 10/30/19---------------------  From: Zuleika Trujillo ([facility] Message Pool ([id number] [facility] [place]))   To: Mohsen Goldman MD;     Sent: 10/16/2019 12:21:54 PM CDT  Subject: FW: General MessagePt called back at 2:40pmwondering if he still needs a pre-op px a week before seventhough he just had his annual px 8/27/19 with Dr. Cortez? Instructed pt to yes keep his appt on 10/23 for pre-op for Colonoscopy guidelines and make sure he is cleared for the procedure. Pt agrees.

## 2022-02-16 NOTE — NURSING NOTE
Depression Screening Entered On:  8/27/2019 2:32 PM CDT    Performed On:  8/27/2019 2:31 PM CDT by Linnea Lucas CMA               Depression Screening   Little Interest - Pleasure in Activities :   More than half the days   Feeling Down, Depressed, Hopeless :   Several days   Initial Depression Screen Score :   3    Trouble Falling or Staying Asleep :   More than half the days   Feeling Tired or Little Energy :   More than half the days   Poor Appetite or Overeating :   Not at all   Feeling Bad About Yourself :   Several days   Trouble Concentrating :   Several days   Moving or Speaking Slowly :   Not at all   Thoughts Better Off Dead or Hurting Self :   Not at all   Detailed Depression Screen Score :   6    Total Depression Screen Score :   9    BRENDAN Difficulty with Work, Home, Others :   Somewhat difficult   Linnea Lucas CMA - 8/27/2019 2:31 PM CDT

## 2022-02-16 NOTE — TELEPHONE ENCOUNTER
"---------------------  From: Pauly Echevarria CMA (Phone Messages Pool ([id number]_[facility]))   To: [facility] Message Pool ([id number] [facility] [place]);     Sent: 3/16/2021 2:32:37 PM CDT  Subject: Rosacea     PCP:   SAMARIA     Time of Call:  210pm       Person Calling:  Pt  Phone number:  [phone]      Note:   Pt calls c/o ongoing concern with rosacea under his eyes and on his cheeks. He now has \"tiny red veins\" under his eyes and on his cheeks. He is wondering if he should see derm? Would you rather see him prior to referral?     Last office visit and reason:  AWV on 9/9/20---------------------  From: Linnea Lucas CMA ([facility] Message Pool ([id number]_[facility]))   To: Eloy Cortez MD;     Sent: 3/16/2021 2:50:24 PM CDT  Subject: FW: Rosacea---------------------  From: Eloy Cortez MD   To: [facility] Message Pool ([id number] [facility] [place]);     Sent: 3/16/2021 4:52:50 PM CDT  Subject: RE: Rosacea     I'm okay with derm referral  ** Submitted: **  Order:Referral (Request)  Details:  3/17/2021 8:43 AM CDT, Referred to: Dermatology, Rosacea         Signed by Linnea Lucas CMA  3/17/2021 1:43:00 PM UTCpt contacted at 0844 and advised  "

## 2022-02-16 NOTE — TELEPHONE ENCOUNTER
---------------------  From: Radha Gaxiola RN (Phone Messages Pool ([id number]_[facility]))   To: Jack King MD;     Sent: 8/25/2021 1:55:42 PM CDT  Subject: General Message       PCP: SAMARIA O/C     Time of Call: 1:36pm       Person Calling: Dr. Honey Cooley  Phone number:  [phone] personal cell    Returned call at: 1:45pm    Note: VM received from Dr. Cooley, stating pt was seen by her today. She verbalized pt continues to have tightness in the neck, shoulders, chest and back. Pt was treated with steroids and MRI imaging is stable. Neurology is concerned of a MS hug (sp?) and nonneurologic pause given his age. BP elevated today 160/90. Pt's symptoms are persistent but are not concerning to send pt to the ER. Dr. Cooley requested collaboration re: pt, to discuss more testing and upcoming visit.     Return call to Dr. Cooley, informed [facility] is out of clinic and will forward message to covering provider.     Last office visit and reason: 9/9/2020 AWV BRMHad tingling episodes this summer more persistent. Had Multiple Sclerosis in past and unlikely to have relapse. MRI stable. Discussed chest pain and recommended being sooner than 2 weeks from now. Called patient to come in tomorrow for chest pain evaluation (present for two months). Patient will call in morning for an appointment

## 2022-02-16 NOTE — PROGRESS NOTES
Patient:   TIGRE VARELA            MRN: [id number]            FIN: [id number]               Age:   67 years     Sex:  Male     :  1950   Associated Diagnoses:   Hyperlipidemia NOS; Hypothyroid; Hypertension; Prostate Cancer Screening; MS (Multiple Sclerosis)   Author:   Eloy Cortez MD      Visit Information      Date of Service: 10/11/2017 03:00 pm  Performing Location: [facility]  Encounter#: [id number]      Primary Care Provider (PCP):  Eloy Cortez MD    NPI# [id number]      Referring Provider:  Eloy Cortez MD, NPI# [id number]      Chief Complaint   10/11/2017 3:16 PM CDT   Physical              Additional Information:No additional information recorded during visit.   Chief complaint and symptoms as noted above and confirmed with patient.  Recent lab and diagnostic studies reviewed with patient      History of Present Illness   2015: Jaren presents to clinic for annual evaluation.  He was evaluated earlier this winter for an isolated episode of chest discomfort.  He underwent a stress echocardiogram which was normal.  No further episodes.  He undergoes health screening labs through his employer.  No recent MS flares.  He did undergo MRI of brain in recent past which showed no significant white matter lesions.  Remains physically active; golfs a regular basis.  Uses treadmill daily.    10/5/2016: Jaren presents to clinic for annual follow-up.  He was seen down at [place] for 5 year follow-up of his laryngeal cancer and is told that he is cancer free.  MS symptoms well controlled.  No other voiced concerns.  Planning on retiring within the next year    10/11/2017: Jaren presents for annual physical.  Feeling well.  MS symptoms well controlled; currently receiving Copaxone 3 times per week.  Exercising regularly.  He has been on citalopram 40 mg daily for at least 10 years; not clear in full clinical circumstances of why it was started.  He has had a history now of multiple random blood sugars in  the 60s; denies any hypoglycemic episodes.         Review of Systems   Constitutional:  No fever, No chills.    Eye:  Negative except as documented in history of present illness.    Ear/Nose/Mouth/Throat:  Negative except as documented in history of present illness.    Respiratory:  No shortness of breath.    Cardiovascular:  No chest pain, No palpitations, No peripheral edema, No syncope.    Gastrointestinal:  No nausea, No vomiting, No abdominal pain.    Genitourinary:  No dysuria, No hematuria.    Hematology/Lymphatics:  Negative except as documented in history of present illness.    Endocrine:  No excessive thirst, No polyuria.    Immunologic:  No recurrent fevers.    Musculoskeletal:  No joint pain, No muscle pain.    Neurologic:  Alert and oriented X4, No numbness, No tingling, No headache.       Health Status   Allergies:    Allergic Reactions (Selected)  Severity Not Documented  Codeine (Hives)   Medications:  (Selected)   Prescriptions  Prescribed  Jobst Facioplasty Garment: Jobst Facioplasty Garment, See Instructions, Instructions: use as directed, Supply, # 1 EA, 0 Refill(s), Type: Maintenance  Metoprolol Succinate  mg oral tablet, extended release: 0.5 tab(s) ( 50 mg ), po, daily, # 45 tab(s), 3 Refill(s), Type: Maintenance, Pharmacy: [facility], 0.5 tab(s) po daily  Vitamin D3 1000 intl units oral capsule: 3 cap(s) ( 3,000 International Unit ), po, daily, # 270 cap(s), 0 Refill(s), Type: Maintenance, Pharmacy: [facility], 3 cap(s) po daily  atorvastatin 80 mg oral tablet: 1 tab(s) ( 80 mg ), po, daily, # 90 tab(s), 3 Refill(s), Type: Maintenance, Pharmacy: [facility], 1 tab(s) po daily  baclofen 10 mg oral tablet: 2 tab(s) ( 20 mg ), PO, tid, # 540 tab(s), 3 Refill(s), Type: Maintenance, Pharmacy: [id number], 2 tab(s) po tid  citalopram 20 mg oral tablet: 1 tab(s) ( 20 mg ), PO, Daily, # 90 tab(s), 1  Refill(s), Type: Maintenance, [facility], Plans to stop after 6 more months, 1 tab(s) po daily  levothyroxine 125 mcg (0.125 mg) oral tablet: 1 tab(s) ( 125 mcg ), po, daily, # 90 tab(s), 3 Refill(s), Type: Maintenance, [facility], 1 tab(s) po daily  nitroglycerin 0.4 mg sublingual tablet: 1 tab(s) ( 0.4 mg ), SL, q5min, PRN: for chest pain, # 10 tab(s), 0 Refill(s), Type: Maintenance, Pharmacy: [id number], 1 tab(s) sl q5 min,PRN:for chest pain  Documented Medications  Documented  Copaxone: ( 40 mg ), Subcutaneous, 3x/wk, 0 Refill(s), Type: Maintenance  Multiple Vitamins oral tablet: 1 tab(s), PO, Daily, 0 Refill(s)  aspirin 325 mg oral tablet: 1 tab(s) ( 325 mg ), PO, Daily, 0 Refill(s)  gabapentin 300 mg oral capsule: 1 cap(s) ( 300 mg ), PO, TID, # 90 cap(s), 0 Refill(s), Type: Maintenance  losartan 50 mg oral tablet: 1.5 tab(s) ( 75 mg ), po, daily, 0 Refill(s), Type: Maintenance   Problem list:    All Problems  Hypothyroid / SNOMED CT 206635105 / Confirmed  Bicipital tendinitis of left shoulder / ICD-9-.12 / Confirmed  Cancer of Base of Tongue / ICD-9-.0 / Confirmed  hyperlipidemia / Confirmed  Hyperlipidemia NOS / ICD-9-.4 / Confirmed  MS (Multiple Sclerosis) / ICD-9- / Confirmed  Inactive: Seizures / ICD-9-.39  Resolved: *Hospitalized@[facility] - Pneumonia, probable aspiration  Resolved: Fx left ankle  Resolved: Hosp for sever flare of MS  Resolved: Inguinal Hernia / ICD-9-.90  Canceled: FHx: Heart Disease / ICD-9-CM V17.49  Canceled: Pneumonia NOS / ICD-9-      Histories   Past Medical History:    Active  Bicipital tendinitis of left shoulder (726.12): Onset in the month of 10/2005 at 55 years  Comments:  8/8/2012 CDT 9:26 AM Linnea Tracy  Left shoulder surgery in 1990.  MS (Multiple Sclerosis) (340)  Comments:  8/8/2012 CDT 9:33 AM Linnea Tracy  MS with dystonic seizures  diagnosed in .  Following an episode of shingles, he had severe exacerbation of MS that lead to a 2-week hospitalization in .  [facility] last flare-up was during a trip to [place] in 2003.    2012 CDT 9:52 AM CDT - Linnea Regan  The MS is followed by Dr. Noriega.  hyperlipidemia  Resolved  *Hospitalized@[facility] - Pneumonia, probable aspiration: Onset on 2011 at 61 years.  Resolved.  Hosp for sever flare of MS: Onset in the month of 1999 at 49 years  Resolved.  Inguinal Hernia (550.90):  Resolved on 2010 at 60 years.  Fx left ankle:  Resolved.  Comments:  2012 CDT 9:16 AM CDT - Linnea Regan  Distal fibular fracture of the right ankle, healed.   Family History:    MI  Father ()  Heart disease  Sister (Salud, )  Comments:  2011 1:11 PM - Neto Drummond MD  CABG  Mother ()  Comments:  2012 9:48 AM - Linnea Regan  CABG performed.  Sister (Estela)  CA - Cancer of ovary  Sister (Salud, )  Gallbladder disease  Mother ()  CABG - Coronary artery bypass graft  Mother ()  Angioplasty  Sister (Salud, ): onset in  at 51 years.  Sister (Estela): onset in  at 54 years.  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  Cardiac stress test stage ([id number]) on 2010 at 59 Years.  Comments:  2012 2:00 PM - Linnea Regan  Treadmill echocardiogram is negative for inducible myocardial ischemia.  Normal resting regional and global left ventricular systolic performance with a visually-estimated ejection fraction of 55% to 65%.    2012 1:56 PM - Linnea Rgean  Summary:  No evidence of cardiac ischemia on electrocardiographic stress testing.  Colonoscopy ([id number]) on 3/25/2009 at 58 Years.  Comments:  2014 8:32 AM - Linnea Lucas CMA  negative-no polyps/masses  Colonoscopy  ([id number]) in the month of 2/2008 at 57 Years.  Comments:  7/8/2010 7:58 AM - BERENICE DIAS  Cardiac stress test stage ([id number]) on 8/14/2007 at 57 Years.  Comments:  8/8/2012 9:22 AM - Linnea Regan  The treadmill portion of this Cardiolite stress test was  normal.  Laparoscopic repair of inguinal hernia ([id number]) on 3/31/2005 at 54 Years.  Comments:  7/6/2011 12:25 PM - Lance Linnea HENDERSON  Left  Flexible sigmoidoscopy ([id number]) on 2/19/2004 at 53 Years.  Left shoulder surgery in 1990 at 40 Years.  Left knee surgery in 1970 at 20 Years.  T & A.   Social History:        Alcohol Assessment: Current            Current, Beer (12 oz), 5 times per week, 1 drinks/episode average.      Tobacco Assessment: Denies Tobacco Use            Past, 5 year(s).                     Comments:                      08/08/2012 - Lance HENDERSONLinnea                     Quit at age 26yo      Substance Abuse Assessment: Past            Past, Marijuana      Employment and Education Assessment            Employed, Work/School description: .      Home and Environment Assessment            Marital status: .  Spouse/Partner name: Sara Mancera.      Exercise and Physical Activity Assessment: Regular exercise            Exercise frequency: 6-7 x/week.  Exercise type: Weight lifting, treadmill.      Sexual Assessment            Sexually active: Yes.  Sexual orientation: Heterosexual.        Physical Examination   vital signs stable, as noted above   Vital Signs   10/11/2017 3:16 PM CDT Temperature Tympanic 97 DegF  LOW    Peripheral Pulse Rate 56 bpm  LOW    Systolic Blood Pressure 132 mmHg    Diastolic Blood Pressure 80 mmHg    Mean Arterial Pressure 97 mmHg    BP Site Right arm      Measurements from flowsheet : Measurements   10/11/2017 3:16 PM CDT Height Measured - Standard 68 in    Weight Measured - Standard 154.6 lb    BSA 1.83 m2    Body Mass Index 23.5 kg/m2      General:  Alert and oriented, No acute  distress.    Eye:  Extraocular movements are intact.    HENT:  Normocephalic, Tympanic membranes are clear, Oral mucosa is moist.    Neck:  Supple, No carotid bruit, No jugular venous distention, No lymphadenopathy.    Respiratory:  Lungs are clear to auscultation, Respirations are non-labored, Breath sounds are equal.    Cardiovascular:  Normal rate, Regular rhythm, No murmur, No edema.    Gastrointestinal:  Soft, Non-distended, Normal bowel sounds.    Musculoskeletal:  Normal range of motion, Normal strength, No tenderness.    Neurologic:  Alert, Oriented, Normal motor function, No focal deficits.    Cognition and Speech:  Oriented, Speech clear and coherent.    Psychiatric:  Appropriate mood & affect.       Review / Management   Results review:  Lab results   10/5/2017 9:00 AM CDT Sodium Level 139 mmol/L     Potassium Level 4.7 mmol/L     Chloride Level 102 mmol/L     CO2 Level 30 mmol/L     Glucose Level 62 mg/dL  LOW     BUN 19 mg/dL     Creatinine 1.18 mg/dL     BUN/Creat Ratio NOT APPLICABLE     eGFR 64 mL/min/1.73m2     eGFR African American 75 mL/min/1.73m2     Calcium Level 9.7 mg/dL     TSH 2.77 mIU/L     PSA 1.2 ng/mL    7/3/2017 9:55 AM CDT Creatinine 1.14 mg/dL (Modified)    eGFR 67 mL/min/1.73m2 (Modified)    eGFR African American 78 mL/min/1.73m2 (Modified)   .       Impression and Plan   Diagnosis     Hyperlipidemia NOS (KCA32-PN E78.5).     Hypothyroid (MWG31-IN E03.4).     Hypertension (YLP35-PA I10).     Prostate Cancer Screening (HDY98-SQ Z12.5).     MS (Multiple Sclerosis) (ZTE93-SD G35).       .) multiple sclerosis, relapsing/remitting - follows with Dr. 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 Rubén Drummond (cardio)   - normal stress  echo in 2/2015    .) health maintenance   - CRC due in 2019   - continue annual PSA testing   - hypothyroid;    - advised tapering off citalopram; reduce to 20mg daily for 6 months, then stop   - hypoglycemia; asymptomatic - monitor for now    RTC annually

## 2022-02-16 NOTE — PROGRESS NOTES
Chief Complaint    cpap concerns  History of Present Illness      Patient is here for follow-up of CPAP.  He is not satisfied with his mask or equipment provider.  He feels like treatment has not helped at all.  Review of Systems      Nocturia once per night.  No heartburn, headache, chest pain, dyspnea, edema, dysphasia.  Chronic right-sided numbness and unsteadiness due to his MS.  Physical Exam   Vitals & Measurements    HR: 52(Peripheral)  BP: 145/89     HT: 68 in  WT: 149 lb  BMI: 22.65       Patient appears comfortable.  Alert and oriented.  Airway is Mallampati 1.  Neck is normal.  Chest clear.  Cardiac exam regular.  No edema.  Assessment/Plan       Obstructive sleep apnea syndrome, moderate (G47.33)        On September 12 optimal CPAP titration to 6.  Subjectively and objectively Is not helping.  He will return to his provider to have the machine checked out as well as his mask.  If we cannot resolve this he might need another CPAP titration.  Patient Information     Name:TIGRE VARELA      Address:      [address] [place] [address] [place] [address]     Sex:Male     YOB: 1950     [phone]     Emergency Contact:JUAN M VARELA     MRN:[id number]     FIN:[id number]     Location[facility]     Date of Service:11/02/2018      Primary Care Physician:       Eloy Cortez MD, [phone]      Attending Physician:       Mohsen Goldman MD, [phone]  Problem List/Past Medical History    Ongoing     H/O laryngeal cancer     Hyperlipidemia     Hyperlipidemia     Hypothyroid     MS (multiple sclerosis)       Comments: The MS is followed by Dr. Noriega. MS with dystonic seizures diagnosed in 1991. Following an episode of shingles, he had severe exacerbation of MS that lead to a 2-week hospitalization in 1999. Radha last flare-up was during a trip to [facility] in April of 2003.     Obstructive sleep apnea syndrome, moderate       Comments: CPAP titration in lab 9/1/18 to 6 cm  water pressure. On HST AHI 24 and oximetry maxine 80%.     Raynauds syndrome     Seizures    Historical     *Hospitalized@[facility] - Pneumonia, probable aspiration     Bicipital tendinitis of left shoulder       Comments: Left shoulder surgery in 1990.     Fx left ankle       Comments: Distal fibular fracture of the right ankle, healed.     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  Normal resting regional and global left ventricular systolic performance with a visually-estimated ejection fraction of 55% to 65%.      Summary:  No evidence of cardiac ischemia on electrocardiographic stress testing.     Colonoscopy (03/25/2009)      Comments:      negative-no polyps/masses     Colonoscopy (02/2008)      Comments:      neg     Cardiac stress test stage (08/14/2007)      Comments:      The treadmill portion of this Cardiolite stress test was  normal.     Laparoscopic repair of inguinal hernia (03/31/2005)      Comments:      Left     Flexible sigmoidoscopy (02/19/2004)     Left shoulder surgery (1990)     Left knee surgery (1970)     T & A  Medications        Copaxone: 40 mg, Subcutaneous, 3x/wk, 0 Refill(s).        Multiple Vitamins oral tablet: 1 tab(s), PO, Daily.        aspirin 325 mg oral tablet: 325 mg, 1 tab(s), PO, Daily.        Jobst Facioplasty Garment: See Instructions, use as directed, 1 EA.        nitroglycerin 0.4 mg sublingual tablet: 0.4 mg, 1 tab(s), SL, q5min, 10 tab(s), PRN: for chest pain.        baclofen 10 mg oral tablet: 20 mg, 2 tab(s), PO, tid, 540 tab(s), 3 Refill(s).        gabapentin 300 mg oral capsule: 300 mg, 1 cap(s), PO, TID, 90 cap(s), 0 Refill(s).        Vitamin D3 1000 intl units oral capsule: 3,000 International Unit, 3 cap(s), po, daily, 270 cap(s), 0 Refill(s).         citalopram 20 mg oral tablet: 20 mg, 1 tab(s), PO, Daily, 90 tab(s), 1 Refill(s).        Metoprolol Succinate  mg oral tablet, extended release: 50 mg, 0.5 tab(s), po, daily, 45 tab(s), 3 Refill(s).        losartan 50 mg oral tablet: 75 mg, 1.5 tab(s), po, daily, 45 tab(s), 1 Refill(s).        CPAP +6 cm H2o: See Instructions, Heated humidifier, heated tubing, filters, nasal or full face mask of choice with headgear.  Replacement cushions and supplies as needed.  Months = 99 (Lifetime), 1 EA, 0 Refill(s).        atorvastatin 80 mg oral tablet: 80 mg, 1 tab(s), Oral, daily, 90 tab(s), 0 Refill(s).        levothyroxine 125 mcg (0.125 mg) oral tablet: 125 mcg, 1 tab(s), Oral, daily, 90 tab(s), 2 Refill(s).         Allergies    codeine (Hives)  Social History    Smoking Status - 10/01/2018     Former smoker     Alcohol      Current, Beer (12 oz), 6 times per week, 1 drinks/episode average., 10/16/2017     Employment and Education      Employed, Work/School description: ., 09/26/2015     Exercise and Physical Activity      Exercise frequency: 6-7 x/week. Exercise type: Weight lifting, treadmill., 11/02/2016     Home and Environment      Marital status: . Spouse/Partner name: Sara Mancera., 09/26/2015     Sexual      Sexually active: Yes. Sexual orientation: Heterosexual., 09/26/2015     Substance Abuse      Past, Marijuana, 08/08/2012     Tobacco      Past, 5 year(s)., 09/04/2013  Family History    ASHD - Atherosclerotic heart disease: Mother and Sister.    Angioplasty: Sister (Dx at 51 years) and Sister (Dx at 54 years).    CA - Cancer of ovary: Sister.    CABG - Coronary artery bypass graft: Mother.    Gallbladder: Mother.    Gallbladder disease: Mother.    Heart disease: Mother, Sister and Sister.    MI: Father.    Sister: History is negative  Immunizations      Vaccine Date Status Comments      influenza virus vaccine, inactivated 10/01/2018 Given      influenza virus vaccine,  inactivated 10/11/2017 Given      pneumococcal (PCV13) 10/11/2017 Given      influenza virus vaccine, inactivated 10/05/2016 Given      influenza virus vaccine, inactivated 09/14/2015 Given      influenza virus vaccine, inactivated 09/14/2015 Given      influenza virus vaccine, inactivated 08/29/2014 Given      tetanus/diphth/pertuss (Tdap) adult/adol 08/29/2014 Given      ZOS, shingles 09/04/2013 Given      influenza virus vaccine, inactivated 09/04/2013 Given      influenza virus vaccine, inactivated 10/04/2011 Given [10/4/2011] Ok per Dr. Drummond.      pneumococcal (PPSV23) 07/29/2011 Recorded      influenza 10/08/2010 Given      influenza, H1N1, inactivated 11/25/2009 Recorded      Hep A 02/21/2003 Recorded      Td 02/01/2003 Recorded  Lab Results          Lab Results (Last 4 results within 90 days)           Sodium Level: 140 mmol/L [135 mmol/L - 146 mmol/L] (08/09/18 13:55:00)          Potassium Level: 4.6 mmol/L [3.5 mmol/L - 5.3 mmol/L] (08/09/18 13:55:00)          Chloride Level: 104 mmol/L [98 mmol/L - 110 mmol/L] (08/09/18 13:55:00)          CO2 Level: 30 mmol/L [20 mmol/L - 32 mmol/L] (08/09/18 13:55:00)          Glucose Level: 95 mg/dL [65 mg/dL - 99 mg/dL] (08/09/18 13:55:00)          BUN: 20 mg/dL [7 mg/dL - 25 mg/dL] (08/09/18 13:55:00)          Creatinine Level: 1.13 mg/dL [0.7 mg/dL - 1.25 mg/dL] (08/09/18 13:55:00)          BUN/Creat Ratio: NOT APPLICABLE [6  - 22] (08/09/18 13:55:00)          eGFR: 67 mL/min/1.73m2 [8.6 mg/dL - 10.3 mg/dL] (08/09/18 13:55:00)          eGFR African American: 78 mL/min/1.73m2 [6  - 22] (08/09/18 13:55:00)          Calcium Level: 9.8 mg/dL [8.6 mg/dL - 10.3 mg/dL] (08/09/18 13:55:00)          TSH: 0.3 mIU/L Low [0.4 mIU/L - 4.5 mIU/L] (08/09/18 13:55:00)          PSA: 1.3 ng/mL [3.5 mmol/L - 5.3 mmol/L] (08/09/18 13:55:00)  Diagnostic Results   On 9/12/2018 optimal CPAP titration to 6 cm of water pressure.   Compliance report indicates usage over 4 hours on 100% of  nights over the last 21 days with average usage of 7 hours and 49 minutes per night AHI 25 mostly obstructive with rare centrals.

## 2022-02-16 NOTE — TELEPHONE ENCOUNTER
---------------------  From: Ariela Kirk LPN (Phone Messages Pool ([id number]))   To: [facility] Message Pool ([id number]);     Sent: 5/6/2020 3:02:27 PM CDT  Subject: citalopram     Phone Message    PCP:   SAMARIA      Time of Call:  2:29pm       Person Calling:  pt  Phone number:  [phone]    Note:   Pt LM stating about 2 years ago he stopped citalopram and then about 1 year ago he went back on 20mg. Pt says his wife was just diagnosed with lung cancer and he has been very emotional. Pt says he started taking 40mg again and it is working well.    Pt says he will be out of his Rx next week and would like a new Rx sent in for 40mg.    Last office visit and reason:  10/23/19 pre-op---------------------  From: Linnea Lucas CMA ([facility] Message Pool ([id number]))   To: Eloy Cortez MD;     Sent: 5/6/2020 3:17:45 PM CDT  Subject: FW: citalopram---------------------  From: Eloy Cortez MD   To: Maintenance Assistant Message Pool ([id number]);     Sent: 5/6/2020 3:54:08 PM CDT  Subject: RE: citalopram     this would be fine.  I would offer him to schedule a visit with me to discuss further if he's wishing.  40mg daily dosing is on the high side for citalopram and could increase risk for drug related side effects. I can sympathize with the household emotions right now.talked to pt and he would like to trial the 40mg citalopram at this time, didn't have any issues prev.  I've asked to contact us if any issues and he agrees

## 2022-02-16 NOTE — PROGRESS NOTES
Chief Complaint    cpap issues  History of Present Illness      Patient is continually frustrated with CPAP.  He notes that on the nights he makes an effort to sleep on his side he is AHI on the device is less than 10 and when he makes an effort to sleep on his back its over 24.  He has good success staying off of his back.  He has chronic fatigue history of laryngeal cancer and MS.  He notes improvement with the CPAP and that he sleeps through the night though he is not necessarily less fatigued.  Review of Systems      Chronic nocturia once per night.  No heartburn, headache, chest pain, dyspnea, edema, dysphagia.  Chronic right-sided numbness and unsteadiness due to his MS.  Physical Exam   Vitals & Measurements    HR: 51(Peripheral)  BP: 139/76     HT: 68 in  WT: 149 lb  BMI: 22.65       Patient appears comfortable and in no distress.  Alert and oriented.  Airway is unremarkable.  Neck unremarkable.  Chest clear.  Heart exam regular no edema.  Cranial nerves normal.  Assessment/Plan       H/O laryngeal cancer (Z85.21)         May be complicating his CPAP.         Ordered:          19410 office outpatient visit 15 minutes (Charge), Quantity: 1, MS (multiple sclerosis)  H/O laryngeal cancer  Obstructive sleep apnea syndrome, moderate                MS (multiple sclerosis) (G35)         May be complicating his CPAP.         Ordered:          95734 office outpatient visit 15 minutes (Charge), Quantity: 1, MS (multiple sclerosis)  H/O laryngeal cancer  Obstructive sleep apnea syndrome, moderate                Obstructive sleep apnea syndrome, moderate (G47.33)         Home study revealed an AHI of 24.  In lab titration was optimal to 6 cm of water pressure.  AHI ranged from 0-76 during that CPAP titration.        After discussion of the options we ultimately decided to discontinue CPAP patient will work on sleeping on his signs doing well nothing further.  He is having problems or wishes to pursue CPAP would  repeat an in lab study thinking that either the at-home study was an error somehow or that the titration was.         Ordered:          35693 office outpatient visit 15 minutes (Charge), Quantity: 1, MS (multiple sclerosis)  H/O laryngeal cancer  Obstructive sleep apnea syndrome, moderate           Patient Information     Name:TIGRE VARELA      Address:      [address] [place] [address]     Sex:Male     YOB: 1950     [phone]     Emergency Contact:JUAN M VARELA     MRN:[id number]     FIN:[id number]     Location:[facility]     Date of Service:12/13/2018      Primary Care Physician:       Eloy Cortez MD, [phone]      Attending Physician:       Mohsen Goldman MD, [phone]  Problem List/Past Medical History    Ongoing     H/O laryngeal cancer     Hyperlipidemia     Hyperlipidemia     Hypothyroid     MS (multiple sclerosis)       Comments: The MS is followed by Dr. Noriega. MS with dystonic seizures diagnosed in 1991. Following an episode of shingles, he had severe exacerbation of MS that lead to a 2-week hospitalization in 1999. Radha last flare-up was during a trip to [facility] in April of 2003.     Obstructive sleep apnea syndrome, moderate       Comments: CPAP titration in lab 9/1/18 to 6 cm water pressure. On HST AHI 24 and oximetry maxine 80%.     Raynauds syndrome     Seizures    Historical     *Hospitalized@[facility] - Pneumonia, probable aspiration     Bicipital tendinitis of left shoulder       Comments: Left shoulder surgery in 1990.     Fx left ankle       Comments: Distal fibular fracture of the right ankle, healed.     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  Normal resting regional and global left ventricular systolic  performance with a visually-estimated ejection fraction of 55% to 65%.. Summary:  No evidence of cardiac ischemia on electrocardiographic stress testing..     Colonoscopy (03/25/2009)      Comments: negative-no polyps/masses.     Colonoscopy (02.2008)      Comments: neg.     Cardiac stress test stage (08/14/2007)      Comments: The treadmill portion of this Cardiolite stress test was  normal..     Laparoscopic repair of inguinal hernia (03/31/2005)      Comments: Left.     Flexible sigmoidoscopy (02/19/2004)     Left shoulder surgery (1990)     Left knee surgery (1970)     T & A  Medications        Copaxone: 40 mg, Subcutaneous, 3x/wk, 0 Refill(s).        Multiple Vitamins oral tablet: 1 tab(s), PO, Daily.        aspirin 325 mg oral tablet: 325 mg, 1 tab(s), PO, Daily.        Jobst Facioplasty Garment: See Instructions, use as directed, 1 EA.        nitroglycerin 0.4 mg sublingual tablet: 0.4 mg, 1 tab(s), SL, q5min, 10 tab(s), PRN: for chest pain.        baclofen 10 mg oral tablet: 20 mg, 2 tab(s), PO, tid, 540 tab(s), 3 Refill(s).        gabapentin 300 mg oral capsule: 300 mg, 1 cap(s), PO, TID, 90 cap(s), 0 Refill(s).        Vitamin D3 1000 intl units oral capsule: 3,000 International Unit, 3 cap(s), po, daily, 270 cap(s), 0 Refill(s).        citalopram 20 mg oral tablet: 20 mg, 1 tab(s), PO, Daily, 90 tab(s), 1 Refill(s).        Metoprolol Succinate  mg oral tablet, extended release: 50 mg, 0.5 tab(s), po, daily, 45 tab(s), 3 Refill(s).        losartan 50 mg oral tablet: 75 mg, 1.5 tab(s), po, daily, 45 tab(s), 1 Refill(s).        CPAP +6 cm H2o: See Instructions, Heated humidifier, heated tubing, filters, nasal or full face mask of choice with headgear.  Replacement cushions and supplies as needed.  Months = 99 (Lifetime), 1 EA, 0 Refill(s).        atorvastatin 80 mg oral tablet: 80 mg, 1 tab(s), Oral, daily, 90 tab(s), 0 Refill(s).        levothyroxine 125 mcg (0.125 mg) oral tablet: 125 mcg, 1 tab(s),  Oral, daily, 90 tab(s), 2 Refill(s).         Allergies    codeine (Hives)  Social History    Smoking Status - 10/01/2018     Former smoker     Alcohol      Current, Beer (12 oz), 6 times per week, 1 drinks/episode average., 10/16/2017     Employment and Education      Employed, Work/School description: ., 09/26/2015     Exercise and Physical Activity      Exercise frequency: 6-7 x/week. Exercise type: Weight lifting, treadmill., 11/02/2016     Home and Environment      Marital status: . Spouse/Partner name: Sara Mancera., 09/26/2015     Sexual      Sexually active: Yes. Sexual orientation: Heterosexual., 09/26/2015     Substance Abuse      Past, Marijuana, 08/08/2012     Tobacco      Past, 5 year(s)., 09/04/2013  Family History    ASHD - Atherosclerotic heart disease: Mother and Sister.    Angioplasty: Sister (Dx at 51 years) and Sister (Dx at 54 years).    CA - Cancer of ovary: Sister.    CABG - Coronary artery bypass graft: Mother.    Gallbladder: Mother.    Gallbladder disease: Mother.    Heart disease: Mother, Sister and Sister.    MI: Father.    Sister: History is negative  Immunizations      Vaccine Date Status Comments      influenza virus vaccine, inactivated 10/01/2018 Given      influenza virus vaccine, inactivated 10/11/2017 Given      pneumococcal (PCV13) 10/11/2017 Given      influenza virus vaccine, inactivated 10/05/2016 Given      influenza virus vaccine, inactivated 09/14/2015 Given      influenza virus vaccine, inactivated 09/14/2015 Given      influenza virus vaccine, inactivated 08/29/2014 Given      tetanus/diphth/pertuss (Tdap) adult/adol 08/29/2014 Given      ZOS, shingles 09/04/2013 Given      influenza virus vaccine, inactivated 09/04/2013 Given      influenza virus vaccine, inactivated 10/04/2011 Given [10/4/2011] Ok per Dr. Drummond.      pneumococcal (PPSV23) 07/29/2011 Recorded      influenza 10/08/2010 Given      influenza, H1N1, inactivated 11/25/2009  Recorded      Hep A 02/21/2003 Recorded      Td 02/01/2003 Recorded  Diagnostic Results   AHI on home sleep test was 24.  In lab CPAP titration was optimal to 6 cm of water with an AHI of 3 and 59.5 minutes of REM sleep including supine REM.

## 2022-02-16 NOTE — TELEPHONE ENCOUNTER
"---------------------  From: Josefina Raymundo CMA ([facility] Message Pool ([id number])   )   To: Hao Domingo MD;     Sent: 5/4/2020 9:48:01 AM CDT  Subject: FW: General Message-requesting a call           ---------------------  From: Linnea Lucas CMA (Phone Messages Pool ([id number]_[facility]))   To: [facility] Message Pool ([id number]_[facility])   ;     Sent: 5/4/2020 9:43:16 AM CDT  Subject: General Message-requesting a call     Phone Message    PCP:   MARIA ESTHER      Time of Call:  0920       Person Calling:  Jaren Mancera  Phone number:  [phone]    Returned call at: _    Note:   Jaren requesting a call back from [facility] - states \"he will be much nicer than last week\"---------------------  From: Hao Domingo MD   To: Josefina Raymundo CMA;     Sent: 5/4/2020 3:45:41 PM CDT  Subject: RE: General Message-requesting a call     ANGELES - I just talked to Sara (she is in the ER again) and she does not want me to talk to Jaren right now.noted  "

## 2022-02-16 NOTE — LETTER
(Inserted Image. Unable to display)   August 27, 2020      TIGRE VARELA  [address]  [place], [place] [id number]        Dear TIGRE,     Thank you for selecting [facility] ([facility]viously [facility]) for your healthcare needs. Below you will find the results of your recent test(s) done at our clinic.       Labs for your review.  We can discuss in more detail at future clinic visit.       Result Name Current Result Previous Result Reference Range   Sodium Level (mmol/L)  138 8/26/2020  141 8/20/2019 135 - 146   Potassium Level (mmol/L)  4.9 8/26/2020  4.6 8/20/2019 3.5 - 5.3   Chloride Level (mmol/L)  102 8/26/2020  104 8/20/2019 98 - 110   CO2 Level (mmol/L)  30 8/26/2020  31 8/20/2019 20 - 32   Glucose Level (mg/dL)  87 8/26/2020  86 8/20/2019 65 - 99   BUN (mg/dL)  21 8/26/2020  21 8/20/2019 7 - 25   Creatinine Level (mg/dL)  1.21 8/26/2020 ((H)) 1.31 8/20/2019 0.70 - 1.25   BUN/Creat Ratio  NOT APPLICABLE 8/26/2020  16 8/20/2019 6 - 22   eGFR (mL/min/1.73m2)  61 8/26/2020 ((L)) 56 8/20/2019 > OR = 60 -    eGFR  (mL/min/1.73m2)  71 8/26/2020  65 8/20/2019 > OR = 60 -    Calcium Level (mg/dL)  9.7 8/26/2020  9.4 8/20/2019 8.6 - 10.3   Vitamin D 25 OH (ng/mL)  61 8/26/2020  30 - 100   Cholesterol (mg/dL)  164 8/26/2020  147 8/20/2019  - <200   Non-HDL Cholesterol  109 8/26/2020  106 8/20/2019  - <130   HDL (mg/dL)  55 8/26/2020  41 8/20/2019 > OR = 40 -    Cholesterol/HDL Ratio  3.0 8/26/2020  3.6 8/20/2019  - <5.0   LDL  93 8/26/2020  83 8/20/2019    Triglyceride (mg/dL)  69 8/26/2020  133 8/20/2019  - <150   TSH (mIU/L) ((H)) 9.35 8/26/2020  0.61 8/20/2019 0.40 - 4.50   PSA (ng/mL)  1.3 8/26/2020  1.5 8/20/2019  - < OR = 4.0       Please contact me or my assistant at [phone] if you have any questions or concerns.     Sincerely,        Eloy Cortez MD    What do your labs mean?  Below is a glossary of commonly ordered labs:  LDL - Bad Cholesterol  HDL - Good  Cholesterol  AST/ALT - Liver Function  Cr/Creatinine - Kidney Function  Microalbumin - Kidney Function  BUN - Kidney Function  PSA - Prostate   TSH - Thyroid Hormone  HgbA1c - Diabetes Test  Hgb (Hemoglobin) - Red Blood Cells

## 2022-02-16 NOTE — LETTER
(Inserted Image. Unable to display)   December 01, 2020      TIGRE VARELA  [address]  [place], [place] [id number]        Dear TIGRE,     Thank you for selecting [facility] ([facility] for your healthcare needs. Below you will find the results of your recent test(s) done at our clinic.      Thyroid levels back within normal range.  Plans to continue with current levothyroxine dosing      Result Name Current Result Previous Result Reference Range   TSH (mIU/L)  2.28 11/25/2020 ((H)) 9.35 8/26/2020 0.40 - 4.50       Please contact me or my assistant at [phone] if you have any questions or concerns.     Sincerely,        Eloy Cortez MD    What do your labs mean?  Below is a glossary of commonly ordered labs:  LDL - Bad Cholesterol  HDL - Good Cholesterol  AST/ALT - Liver Function  Cr/Creatinine - Kidney Function  Microalbumin - Kidney Function  BUN - Kidney Function  PSA - Prostate   TSH - Thyroid Hormone  HgbA1c - Diabetes Test  Hgb (Hemoglobin) - Red Blood Cells

## 2022-02-16 NOTE — PROGRESS NOTES
Patient:   TIGRE VARELA            MRN: [id number]            FIN: [id number]               Age:   67 years     Sex:  Male     :  1950   Associated Diagnoses:   Hyperlipidemia NOS; Hypothyroid; Hypertension; Prostate Cancer Screening; MS (Multiple Sclerosis)   Author:   Eloy Cortez MD      Visit Information      Date of Service: 2017 01:07 pm  Performing Location: [facility]  Encounter#: [id number]      Primary Care Provider (PCP):  Eloy Cortez MD    NPI# [id number]      Referring Provider:  Eloy Cortez MD, NPI# [id number]      Chief Complaint   2017 1:15 PM CST    Patient presents for bottom of R foot sore x 1 month Also fingers are turning white x couple years.              Additional Information:No additional information recorded during visit.   Chief complaint and symptoms as noted above and confirmed with patient.  Recent lab and diagnostic studies reviewed with patient      History of Present Illness   2015: Jaren presents to clinic for annual evaluation.  He was evaluated earlier this winter for an isolated episode of chest discomfort.  He underwent a stress echocardiogram which was normal.  No further episodes.  He undergoes health screening labs through his employer.  No recent MS flares.  He did undergo MRI of brain in recent past which showed no significant white matter lesions.  Remains physically active; golfs a regular basis.  Uses treadmill daily.    10/5/2016: Jaren presents to clinic for annual follow-up.  He was seen down at [place] for 5 year follow-up of his laryngeal cancer and is told that he is cancer free.  MS symptoms well controlled.  No other voiced concerns.  Planning on retiring within the next year    10/11/2017: Jaren presents for annual physical.  Feeling well.  MS symptoms well controlled; currently receiving Copaxone 3 times per week.  Exercising regularly.  He has been on citalopram 40 mg daily for at least 10 years; not clear in full clinical  circumstances of why it was started.  He has had a history now of multiple random blood sugars in the 60s; denies any hypoglycemic episodes.    12/7/2017: Jarrod presents to clinic with several year history of rainouts features in both hands.  Expectedly prominent in winter season.  Never developed any sores on hands.  Denies any significant pains.  Also brings approximately 1 month history of pain on the plantar aspect of right forefoot.  No recent changes in activity level.  He does have some baseline neuropathy attributable to his MS.  No changes in activity.  No described trauma.         Review of Systems   Constitutional:  No fever, No chills.    Eye:  Negative except as documented in history of present illness.    Ear/Nose/Mouth/Throat:  Negative except as documented in history of present illness.    Respiratory:  No shortness of breath.    Cardiovascular   Gastrointestinal   Genitourinary   Hematology/Lymphatics:  Negative except as documented in history of present illness.    Endocrine   Immunologic:  No recurrent fevers.    Musculoskeletal:  Joint pain, No muscle pain, No trauma.    Integumentary:  Raynauds.    Neurologic:  Alert and oriented X4, Numbness, No tingling, No headache.       Health Status   Allergies:    Allergic Reactions (Selected)  Severity Not Documented  Codeine (Hives)   Medications:  (Selected)   Prescriptions  Prescribed  Jobst Facioplasty Garment: Jobst Facioplasty Garment, See Instructions, Instructions: use as directed, Supply, # 1 EA, 0 Refill(s), Type: Maintenance  Metoprolol Succinate  mg oral tablet, extended release: 0.5 tab(s) ( 50 mg ), po, daily, # 45 tab(s), 3 Refill(s), Type: Maintenance, Pharmacy: [facility], 0.5 tab(s) po daily  Vitamin D3 1000 intl units oral capsule: 3 cap(s) ( 3,000 International Unit ), po, daily, # 270 cap(s), 0 Refill(s), Type: Maintenance, Pharmacy: [facility], 3 cap(s) po daily  atorvastatin 80  mg oral tablet: 1 tab(s) ( 80 mg ), po, daily, # 90 tab(s), 3 Refill(s), Type: Maintenance, [facility], 1 tab(s) po daily  baclofen 10 mg oral tablet: 2 tab(s) ( 20 mg ), PO, tid, # 540 tab(s), 3 Refill(s), Type: Maintenance, Pharmacy: [id number], 2 tab(s) po tid  citalopram 20 mg oral tablet: 1 tab(s) ( 20 mg ), PO, Daily, # 90 tab(s), 1 Refill(s), Type: Maintenance, [facility], Plans to stop after 6 more months, 1 tab(s) po daily  levothyroxine 125 mcg (0.125 mg) oral tablet: 1 tab(s) ( 125 mcg ), po, daily, # 90 tab(s), 3 Refill(s), Type: Maintenance, [facility], 1 tab(s) po daily  nitroglycerin 0.4 mg sublingual tablet: 1 tab(s) ( 0.4 mg ), SL, q5min, PRN: for chest pain, # 10 tab(s), 0 Refill(s), Type: Maintenance, Pharmacy: [id number], 1 tab(s) sl q5 min,PRN:for chest pain  Documented Medications  Documented  Copaxone: ( 40 mg ), Subcutaneous, 3x/wk, 0 Refill(s), Type: Maintenance  Multiple Vitamins oral tablet: 1 tab(s), PO, Daily, 0 Refill(s)  aspirin 325 mg oral tablet: 1 tab(s) ( 325 mg ), PO, Daily, 0 Refill(s)  gabapentin 300 mg oral capsule: 1 cap(s) ( 300 mg ), PO, TID, # 90 cap(s), 0 Refill(s), Type: Maintenance  losartan 50 mg oral tablet: 1.5 tab(s) ( 75 mg ), po, daily, 0 Refill(s), Type: Maintenance   Problem list:    All Problems  Hypothyroid / SNOMED CT 403725170 / Confirmed  Bicipital tendinitis of left shoulder / SNOMED CT 271301037 / Confirmed  Hyperlipidemia / SNOMED CT 43177138 / Confirmed  hyperlipidemia / Confirmed  Cancer of base of tongue / SNOMED CT 431622412 / Confirmed  MS (multiple sclerosis) / SNOMED CT 55298883 / Confirmed  Inactive: Seizures / SNOMED CT 398162601  Resolved: *[facility] - Pneumonia, probable aspiration  Resolved: Fx left ankle  Resolved: Hosp for sever flare of MS  Resolved: Inguinal hernia / SNOMED CT  7580895134  Canceled: FHx: Heart Disease / ICD-9-CM V17.49  Canceled: Pneumonia NOS / ICD-9-      Histories   Past Medical History:    Active  Bicipital tendinitis of left shoulder (655890409): Onset in the month of 10/2005 at 55 years  Comments:  2012 CDT 9:26 AM Linnea Tracy  Left shoulder surgery in .  Hyperlipidemia (60651086)  MS (multiple sclerosis) (79465732)  Comments:  2012 CDT 9:33 AM CDT Linnea Rodriguez  MS with dystonic seizures diagnosed in .  Following an episode of shingles, he had severe exacerbation of MS that lead to a 2-week hospitalization in .  Radha last flare-up was during a trip to [facility] in 2003.    2012 CDT 9:52 AM CDT - Linnea Regan  The MS is followed by Dr. Noriega.  Cancer of base of tongue (317299160)  hyperlipidemia  Resolved  *Hospitalized@[facility] - Pneumonia, probable aspiration: Onset on 2011 at 61 years.  Resolved.  Hosp for sever flare of MS: Onset in the month of 1999 at 49 years  Resolved.  Inguinal hernia (1526216489):  Resolved on 2010 at 60 years.  Fx left ankle:  Resolved.  Comments:  2012 CDT 9:16 AM Linnea Tracy  Distal fibular fracture of the right ankle, healed.   Family History:    MI  Father ()  Heart disease  Sister (Salud, )  Comments:  2011 1:11 PM - Neto Drummond MD  CABG  Mother ()  Comments:  2012 9:48 AM - Linnea Regan  CABG performed.  Sister (Estela)  CA - Cancer of ovary  Sister (Salud, )  Gallbladder disease  Mother ()  CABG - Coronary artery bypass graft  Mother ()  Angioplasty  Sister (Salud, ): onset in  at 51 years.  Sister (Estela): onset in  at 54 years.  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  Cardiac stress test stage (3650523129) on 2010 at 59  Years.  Comments:  8/7/2012 2:00 PM - Linnea Regan  Treadmill echocardiogram is negative for inducible myocardial ischemia.  Normal resting regional and global left ventricular systolic performance with a visually-estimated ejection fraction of 55% to 65%.    8/7/2012 1:56 PM - Linnea Regan  Summary:  No evidence of cardiac ischemia on electrocardiographic stress testing.  Colonoscopy (658757072) on 3/25/2009 at 58 Years.  Comments:  8/29/2014 8:32 AM - Linnea Lucas CMA  negative-no polyps/masses  Colonoscopy (952367805) in the month of 2/2008 at 57 Years.  Comments:  7/8/2010 7:58 AM - BERENICE DIAS  Cardiac stress test stage (8197682152) on 8/14/2007 at 57 Years.  Comments:  8/8/2012 9:22 AM - Linnea Regan  The treadmill portion of this Cardiolite stress test was  normal.  Laparoscopic repair of inguinal hernia (97382271) on 3/31/2005 at 54 Years.  Comments:  7/6/2011 12:25 PM - Linnea Lucas CMA  Left  Flexible sigmoidoscopy (40931370) on 2/19/2004 at 53 Years.  Left shoulder surgery in 1990 at 40 Years.  Left knee surgery in 1970 at 20 Years.  T & A.   Social History:        Alcohol Assessment            Current, Beer (12 oz), 6 times per week, 1 drinks/episode average.      Tobacco Assessment            Past, 5 year(s).                     Comments:                      08/08/2012 - Linnea Lucas CMA                     Quit at age 28yo      Substance Abuse Assessment            Past, Marijuana      Employment and Education Assessment            Employed, Work/School description: .      Home and Environment Assessment            Marital status: .  Spouse/Partner name: Sara Mancera.      Exercise and Physical Activity Assessment            Exercise frequency: 6-7 x/week.  Exercise type: Weight lifting, treadmill.      Sexual Assessment            Sexually active: Yes.  Sexual orientation: Heterosexual.        Physical Examination   vital signs stable, as noted above   Vital Signs    12/7/2017 1:15 PM CST Temperature Tympanic 97.6 DegF  LOW    Peripheral Pulse Rate 68 bpm    Pulse Site Radial artery    HR Method Manual    Systolic Blood Pressure 128 mmHg    Diastolic Blood Pressure 70 mmHg    Mean Arterial Pressure 89 mmHg    BP Site Right arm    BP Method Manual      Measurements from flowsheet : Measurements   12/7/2017 1:15 PM CST Height Measured - Standard 68 in    Weight Measured - Standard 155.4 lb    BSA 1.84 m2    Body Mass Index 23.63 kg/m2      General:  Alert and oriented, No acute distress.    Eye:  Extraocular movements are intact.    HENT:  Tympanic membranes are clear.    Respiratory:  Respirations are non-labored.    Cardiovascular:  Normal rate.    Musculoskeletal:  Normal range of motion, Normal strength, No tenderness, Callus on plantar aspect of foot; no plantar wart features; focal pains along 3rd metatarsal; normal PROM.    Integumentary:  Raynauds features throughout hands.    Neurologic:  Alert, Oriented, Normal motor function, No focal deficits.    Cognition and Speech:  Oriented, Speech clear and coherent.    Psychiatric:  Appropriate mood & affect.       Review / Management   Results review:  Lab results   10/5/2017 9:00 AM CDT Sodium Level 139 mmol/L    Potassium Level 4.7 mmol/L    Chloride Level 102 mmol/L    CO2 Level 30 mmol/L    Glucose Level 62 mg/dL  LOW    BUN 19 mg/dL    Creatinine 1.18 mg/dL    BUN/Creat Ratio NOT APPLICABLE    eGFR 64 mL/min/1.73m2    eGFR African American 75 mL/min/1.73m2    Calcium Level 9.7 mg/dL    TSH 2.77 mIU/L    PSA 1.2 ng/mL   .       Impression and Plan   Diagnosis     Hyperlipidemia NOS (EQX82-BU E78.5).     Hypothyroid (NCJ41-MW E03.4).     Hypertension (ZEQ44-FL I10).     Prostate Cancer Screening (UEY91-XY Z12.5).     MS (Multiple Sclerosis) (IFL77-NK G35).           .) Raynauds phenomena   - general reassurance   - recommending hand warmers/gloves - could consider CCB in the future if developing more pains    .) right foot  pains - no obvious pathology   - Xray of foot obtained - no description of fracture of focal arthritis   - general monitoring; could consider shoe inserts    plan as previously outlined:     .) multiple sclerosis, relapsing/remitting - follows with Dr. Noriega   - on glatiramer    - takes baclofen for spasticity - well controlled    .) laryngeal Ca - resected in '11 - follows through [facility]   - HPV related   - no further surveillance imaging    .) HLPD   - strong family h/o CAD   - on ASA 325mg daily, atorvastatin 80mg qhs   - follows with Dr. Rubén Drummond (cardio)   - normal stress echo in 2/2015    .) health maintenance   - CRC due in 2019   - continue annual PSA testing   - hypothyroid;    - advised tapering off citalopram; reduce to 20mg daily for 6 months, then stop   - hypoglycemia; asymptomatic - monitor for now    RTC as previously scheduled

## 2022-02-16 NOTE — TELEPHONE ENCOUNTER
Entered by Tasia Jacinto CMA on February 21, 2019 9:20:56 AM CST  ---------------------  From: Tasia Jacinto CMA   To: [facility]    Sent: 2/21/2019 9:20:56 AM CST  Subject: Medication Management     ** Submitted: **  Order:atorvastatin (atorvastatin 80 mg oral tablet)  1 tab(s)  Oral  daily  Qty:  90 tab(s)        Refills:  1          Substitutions Allowed     Route To [facility]    Signed by Tasia Jacinto CMA  2/21/2019 9:20:00 AM    ** Submitted: **  Complete:atorvastatin (atorvastatin 80 mg oral tablet)   Signed by Tasia Jacinto CMA  2/21/2019 9:20:00 AM    ** Not Approved:  **  atorvastatin (ATORVASTATIN CALCIUM 80MG TABS)  TAKE ONE TABLET BY MOUTH EVERY DAY  Qty:  30 unknown unit        Days Supply:  30        Refills:  0          Substitutions Allowed     Route To [facility]   Signed by Tasia Jacinto CMA            ------------------------------------------  From: [facility]  To: Eloy Cortez MD  Sent: February 20, 2019 7:51:36 PM CST  Subject: Medication Management  Due: February 21, 2019 7:51:36 PM CST    ** On Hold Pending Signature **  Drug: atorvastatin (atorvastatin 80 mg oral tablet)  TAKE ONE TABLET BY MOUTH EVERY DAY  Quantity: 30 unknown unit  Days Supply: 30        Refills: 0  Substitutions Allowed  Notes from Pharmacy:     Dispensed Drug: atorvastatin (atorvastatin 80 mg oral tablet)  TAKE ONE TABLET BY MOUTH EVERY DAY  Quantity: 30 unknown unit  Days Supply: 30        Refills: 0  Substitutions Allowed  Notes from Pharmacy:   ------------------------------------------Med Refill      Date of last office visit and reason:  12/17/18; back pain      Date of last Med Check / Px:   8/9/18  Date of last labs pertaining to med:  8/9/18    RTC order in chart:  yes; August 2019    For Protocol refill, has patient been contacted:  n/a

## 2022-02-16 NOTE — TELEPHONE ENCOUNTER
---------------------  From: Mckenna Kumar CMA (Phone Messages Pool ([id number]_[facility]))   To: [facility] Message Pool ([id number]_[place] [facility]);     Sent: 8/25/2020 4:54:06 PM CDT  Subject: Phone Message, additional labs for tomorrow?     Phone Message    PCP:   SAMARIA      Time of Call:  1643       Person Calling:  Pt   Phone number:  [phone]    Returned call at: N/A    Note:   Pt wonders, if Vitamin D3 and TSH can be added onto tomorrows lab visit at 0900? Please advise.     Last office visit and reason:  10/23/2019 preoperative clearance exam BRM.---------------------  From: Linnea Lucas CMA ([facility] Message Pool ([id number]_[facility]))   To: Eloy Cortez MD;     Sent: 8/25/2020 6:00:27 PM CDT  Subject: FW: Phone Message, additional labs for tomorrow?     TSH already added  talked with Jaren and he states they always check his Vitamin D level for his MS.  Will be doing a virtual visit with his MS Dr this time and asking this to be added on to his labs.    Total Vit D hydroxy added---------------------  From: Eloy Cortez MD   To: [facility] Message Pool ([id number]_[place] [facility]);     Sent: 8/26/2020 7:38:40 AM CDT  Subject: RE: Phone Message, additional labs for tomorrow?     this is fine

## 2022-02-16 NOTE — LETTER
(Inserted Image. Unable to display)   September 10, 2021    TIGRE VARELA  [address] [place] [address]            Dear TIGRE,      Thank you for selecting [facility] for your healthcare needs.    Our records indicate you are due for the following services:    Medicare Annual Wellness Visit.    Non-Fasting Labs.    If you had your labs done at another facility or with Direct Access Lab Testing at [facility], please bring in a copy of the results to your next visit, mail a copy, or drop off a copy of your results to your Healthcare Provider.    (FYI   Regarding office visits: In some instances, a video visit or telephone visit may be offered as an option.)    You are due for lab work and an office visit; please schedule the lab appointment 1 week before the office visit.  This will assure all results are available to discuss with your Healthcare Provider during your visit.    **It is very helpful if you bring your medication bottles to your appointment.  This assures we have all of your current medications, including strength and dosing information, documented accurately in your medical record.    To schedule an appointment or if you have further questions, please contact your clinic at [phone].      Powered by [employer]    Sincerely,    Eloy Cortez MD

## 2022-02-16 NOTE — NURSING NOTE
Comprehensive Intake Entered On:  8/27/2019 9:47 AM CDT    Performed On:  8/27/2019 9:40 AM CDT by Mai Reaves               Summary   Chief Complaint :   Med check. Pt would like to start depression medication.    Advance Directive :   Yes   Weight Measured :   152 lb(Converted to: 152 lb 0 oz, 68.95 kg)    Height Measured :   68 in(Converted to: 5 ft 8 in, 172.72 cm)    Body Mass Index :   23.11 kg/m2   Body Surface Area :   1.82 m2   Systolic Blood Pressure :   122 mmHg   Diastolic Blood Pressure :   78 mmHg   Mean Arterial Pressure :   93 mmHg   Peripheral Pulse Rate :   62 bpm   Temperature Tympanic :   96.2 DegF(Converted to: 35.7 DegC)  (LOW)    Mai Reaves - 8/27/2019 9:40 AM CDT   Health Status   Allergies Verified? :   Yes   Medication History Verified? :   Yes   Medical History Verified? :   Yes   Pre-Visit Planning Status :   Completed   Tobacco Use? :   Never smoker   Mai Reaves - 8/27/2019 9:40 AM CDT   Consents   Consent for Immunization Exchange :   Consent Granted   Consent for Immunizations to Providers :   Consent Granted   Mai Reaves - 8/27/2019 9:40 AM CDT   Meds / Allergies   (As Of: 8/27/2019 9:47:29 AM CDT)   Allergies (Active)   codeine  Estimated Onset Date:   Unspecified ; Reactions:   Hives ; Created By:   Luda Morales; Reaction Status:   Active ; Category:   Drug ; Substance:   codeine ; Type:   Allergy ; Updated By:   Luda Morales; Source:   Paper Chart ; Reviewed Date:   12/17/2018 9:16 AM CST        Medication List   (As Of: 8/27/2019 9:47:29 AM CDT)   Prescription/Discharge Order    atorvastatin  :   atorvastatin ; Status:   Prescribed ; Ordered As Mnemonic:   atorvastatin 80 mg oral tablet ; Simple Display Line:   1 tab(s), Oral, daily, 90 tab(s), 1 Refill(s) ; Ordering Provider:   Eloy Cortez MD; Catalog Code:   atorvastatin ; Order Dt/Tm:   2/21/2019 9:20:39 AM          baclofen  :   baclofen ; Status:   Prescribed ; Ordered As  Mnemonic:   baclofen 10 mg oral tablet ; Simple Display Line:   20 mg, 2 tab(s), PO, tid, 540 tab(s), 3 Refill(s) ; Ordering Provider:   Eloy Cortez MD; Catalog Code:   baclofen ; Order Dt/Tm:   9/18/2015 8:59:22 AM          cholecalciferol  :   cholecalciferol ; Status:   Prescribed ; Ordered As Mnemonic:   Vitamin D3 1000 intl units oral capsule ; Simple Display Line:   3,000 International Unit, 3 cap(s), po, daily, 270 cap(s), 0 Refill(s) ; Ordering Provider:   Eloy Cortez MD; Catalog Code:   cholecalciferol ; Order Dt/Tm:   10/11/2017 3:15:57 PM          citalopram  :   citalopram ; Status:   Completed ; Ordered As Mnemonic:   citalopram 20 mg oral tablet ; Simple Display Line:   20 mg, 1 tab(s), PO, Daily, 90 tab(s), 1 Refill(s) ; Ordering Provider:   Eloy Cortez MD; Catalog Code:   citalopram ; Order Dt/Tm:   10/11/2017 3:30:45 PM          cyclobenzaprine  :   cyclobenzaprine ; Status:   Prescribed ; Ordered As Mnemonic:   cyclobenzaprine 10 mg oral tablet ; Simple Display Line:   10 mg, 1 tab(s), PO, TID, PRN: for spasm, 30 tab(s), 0 Refill(s) ; Ordering Provider:   Hao Jones PA-C; Catalog Code:   cyclobenzaprine ; Order Dt/Tm:   12/17/2018 9:23:27 AM          levothyroxine  :   levothyroxine ; Status:   Prescribed ; Ordered As Mnemonic:   levothyroxine 125 mcg (0.125 mg) oral tablet ; Simple Display Line:   1 tab(s), Oral, daily, 30 tab(s), 0 Refill(s) ; Ordering Provider:   Eloy Cortez MD; Catalog Code:   levothyroxine ; Order Dt/Tm:   7/31/2019 11:21:17 AM          losartan  :   losartan ; Status:   Prescribed ; Ordered As Mnemonic:   losartan 50 mg oral tablet ; Simple Display Line:   75 mg, 1.5 tab(s), po, daily, 45 tab(s), 1 Refill(s) ; Ordering Provider:   Eloy Cortez MD; Catalog Code:   losartan ; Order Dt/Tm:   6/27/2018 4:46:27 PM          methylPREDNISolone  :   methylPREDNISolone ; Status:   Completed ; Ordered As Mnemonic:   Medrol Dosepak 4 mg oral tablet ; Simple Display Line:   1  packet(s), PO, Once, as directed on package labeling, 21 tab(s), 0 Refill(s) ; Ordering Provider:   Robert MCGRATH, Hao PANG; Catalog Code:   methylPREDNISolone ; Order Dt/Tm:   12/17/2018 9:23:59 AM          Metoprolol Succinate  mg oral tablet, extended release  :   Metoprolol Succinate  mg oral tablet, extended release ; Status:   Prescribed ; Ordered As Mnemonic:   Metoprolol Succinate  mg oral tablet, extended release ; Simple Display Line:   See Instructions, TAKE ONE-HALF TABLET BY MOUTH EVERY DAY, 45 unknown unit, 3 Refill(s) ; Ordering Provider:   Eloy Cortez MD; Catalog Code:   metoprolol ; Order Dt/Tm:   12/17/2018 9:25:55 AM          metoprolol  :   metoprolol ; Status:   Prescribed ; Ordered As Mnemonic:   Metoprolol Succinate  mg oral tablet, extended release ; Simple Display Line:   50 mg, 0.5 tab(s), po, daily, 45 tab(s), 3 Refill(s) ; Ordering Provider:   Eloy Cortez MD; Catalog Code:   metoprolol ; Order Dt/Tm:   10/11/2017 4:19:01 PM          Miscellaneous Rx Supply  :   Miscellaneous Rx Supply ; Status:   Prescribed ; Ordered As Mnemonic:   CPAP +6 cm H2o ; Simple Display Line:   See Instructions, Heated humidifier, heated tubing, filters, nasal or full face mask of choice with headgear.  Replacement cushions and supplies as needed.  Months = 99 (Lifetime), 1 EA, 0 Refill(s) ; Ordering Provider:   Mohsen Goldman MD; Catalog Code:   Miscellaneous Rx Supply ; Order Dt/Tm:   10/1/2018 8:42:10 AM          Miscellaneous Rx Supply  :   Miscellaneous Rx Supply ; Status:   Prescribed ; Ordered As Mnemonic:   Jobst Facioplasty Garment ; Simple Display Line:   See Instructions, use as directed, 1 EA ; Ordering Provider:   Eloy Cortez MD; Catalog Code:   Miscellaneous Rx Supply ; Order Dt/Tm:   10/28/2014 4:24:53 PM          nitroglycerin  :   nitroglycerin ; Status:   Prescribed ; Ordered As Mnemonic:   nitroglycerin 0.4 mg sublingual tablet ; Simple Display Line:   0.4 mg, 1 tab(s),  SL, q5min, 10 tab(s), PRN: for chest pain ; Ordering Provider:   Jack King MD; Catalog Code:   nitroglycerin ; Order Dt/Tm:   2/19/2015 9:39:58 AM            Home Meds    aspirin  :   aspirin ; Status:   Documented ; Ordered As Mnemonic:   aspirin 325 mg oral tablet ; Simple Display Line:   325 mg, 1 tab(s), PO, Daily ; Catalog Code:   aspirin ; Order Dt/Tm:   7/7/2010 4:09:02 PM          gabapentin  :   gabapentin ; Status:   Documented ; Ordered As Mnemonic:   gabapentin 300 mg oral capsule ; Simple Display Line:   300 mg, 1 cap(s), PO, TID, 90 cap(s), 0 Refill(s) ; Catalog Code:   gabapentin ; Order Dt/Tm:   10/5/2016 9:00:12 AM          glatiramer  :   glatiramer ; Status:   Documented ; Ordered As Mnemonic:   Copaxone ; Simple Display Line:   40 mg, Subcutaneous, 3x/wk, 0 Refill(s) ; Catalog Code:   glatiramer ; Order Dt/Tm:   7/7/2010 4:08:34 PM          multivitamin  :   multivitamin ; Status:   Documented ; Ordered As Mnemonic:   Multiple Vitamins oral tablet ; Simple Display Line:   1 tab(s), PO, Daily ; Catalog Code:   multivitamin ; Order Dt/Tm:   7/7/2010 4:08:52 PM

## 2022-02-16 NOTE — NURSING NOTE
Quick Intake Entered On:  10/23/2019 11:19 AM CDT    Performed On:  10/23/2019 11:19 AM CDT by Linnea Lucas CMA               Summary   Advance Directive :   Yes   Height Measured :   68 in(Converted to: 5 ft 8 in, 172.72 cm)    Systolic Blood Pressure :   144 mmHg (HI)    Diastolic Blood Pressure :   79 mmHg   Mean Arterial Pressure :   101 mmHg   Linnea Lucas CMA - 10/23/2019 11:19 AM CDT

## 2022-02-16 NOTE — NURSING NOTE
Medicare Visit Entered On:  9/9/2020 9:20 AM CDT    Performed On:  9/9/2020 9:17 AM CDT by Linnea Lucas CMA               Summary   Weight Measured :   152 lb(Converted to: 152 lb 0 oz, 68.95 kg)    Height Measured :   68 in(Converted to: 5 ft 8 in, 172.72 cm)    Body Mass Index :   23.11 kg/m2   Body Surface Area :   1.82 m2   Systolic Blood Pressure :   166 mmHg (HI)    Diastolic Blood Pressure :   78 mmHg   Mean Arterial Pressure :   107 mmHg   Peripheral Pulse Rate :   60 bpm   Temperature Tympanic :   96.2 DegF(Converted to: 35.7 DegC)  (LOW)    Linnea Lucas CMA - 9/9/2020 9:25 AM CDT   Chief Complaint :   AWV   Advance Directive :   Yes   Linnea Lucas CMA - 9/9/2020 9:17 AM CDT   Health Status   Allergies Verified? :   Yes   Medication History Verified? :   Yes   Medical History Verified? :   Yes   Pre-Visit Planning Status :   Completed   Tobacco Use? :   Never smoker   Linnea Lucas CMA - 9/9/2020 9:17 AM CDT   Consents   Consent for Immunization Exchange :   Consent Granted   Consent for Immunizations to Providers :   Consent Granted   Linnea Lucas CMA - 9/9/2020 9:17 AM CDT   Social History   Social History   (As Of: 9/9/2020 9:20:05 AM CDT)   Alcohol:        Current, Beer (12 oz), 6 times per week, 1 drinks/episode average.  2 drinks/episode maximum.   (Last Updated: 8/27/2019 2:32:37 PM CDT by Linnea Lucas CMA)          Tobacco:        Past, 5 year(s).   Comments:  8/8/2012 11:08 AM - Linnea Lucas CMA: Quit at age 26yo   (Last Updated: 9/4/2013 10:53:22 AM CDT by Linnea Lucas CMA)          Substance Abuse:        Past, Marijuana   (Last Updated: 8/8/2012 12:34:52 PM CDT by Linnea Lucas CMA)          Employment/School:        Employed, Work/School description: .   (Last Updated: 9/26/2015 5:55:20 AM CDT by Cynthia Marsh)          Home/Environment:        Marital status: .  Spouse/Partner name: Sara Mancera.   (Last Updated: 9/9/2020 7:15:59 AM CDT by Linnea Lucas CMA)           Exercise:        Exercise frequency: 6-7 x/week.  Exercise type: Weight lifting, treadmill.   (Last Updated: 11/2/2016 1:50:01 PM CDT by Gretta Bright)          Sexual:        Sexually active: Yes.  Sexual orientation: Heterosexual.   (Last Updated: 9/26/2015 5:56:11 AM CDT by Cynthia Marsh)            Geriatric Depression Screening   Geriatric Depression Satisfied Life :   Yes   Geriatric Depression Dropped Activities :   Yes   Geriatric Depression Life Empty :   Yes   Geriatric Depression Bored :   No   Geriatric Depression Good Spirits :   Yes   Geriatric Depression Afraid Bad Things :   No   Geriatric Depression Feel Happy :   Yes   Geriatric Depression Feel Helpless :   No   Geriatric Depression Prefer to Stay Home :   Yes   Geriatric Depression Memory Problems :   No   Geriatric Depression Wonderful Be Alive :   No   Geriatric Depression Feel Worthless :   No   Geriatric Depression Situation Hopeless :   No   Geriatric Depression Others Better Off :   No   Geriatric Depression Full of Energy :   No   Geriatric Depression Total Score :   5    Linnea Lucas CMA - 9/9/2020 9:17 AM CDT   Hearing and Vision Screening   Audiogram Result Right Ear :   Pass     Audiogram Result Left Ear :   Pass       Linnea Lucas CMA - 9/9/2020 9:32 AM CDT     Home Safety Screen   Emergency Numbers Kept/Updated :   Yes   Aware of Smoking Dangers :   Yes   Smoke Alarms/Fire Extinguisher Available :   Yes   Household Members Fire Safety Knowledge :   Yes   Firearms Unloaded and Secure :   Yes   Floor Rugs Removed or Fastened :   No   Mats in Bathtub/Shower :   No   Stairway Rails or Banisters :   Yes   Outdoor Clutter Safety :   Yes   Indoor Clutter Safety :   Yes   Electrical Cord Safety :   Yes   Linnea Lucas CMA 9/9/2020 9:17 AM CDT   Advance Directives   Advance Directive :   Yes   Advanced Directives Status :   Current and verified   Advance Directive Type :   Living will, Medical durable power of    Medical  Power of  Name :   Sara Berrios Sharif  Estela Nicholson   Advance Directive Intent Stated By :   Self   Advance Directive Additional Information :   No   Danae Lucas CMAe - 9/9/2020 9:17 AM CDT   Aubrey Fall Risk   History of Falls in Last 3 Months Aubrey :   No   Danae Lucas CMAe - 9/9/2020 9:17 AM CDT   Functional Assessment   Focused Functional Assessment Grid   Bathing :   Independent (2)   Dressing :   Independent (2)   Toileting :   Independent (2)   Transferring Bed or Chair :   Independent (2)   Feeding :   Independent (2)   Lance HENDERSON Sue - 9/9/2020 9:17 AM CDT   Capable of Shopping :   Yes   Capable of Walking :   Yes   Capable of Housekeeping :   Yes   Capable of Managing Medications :   Yes   Capable of Handling Finances :   Yes   Danae Lucas CMAe - 9/9/2020 9:17 AM CDT

## 2022-02-16 NOTE — NURSING NOTE
Quick Intake Entered On:  9/9/2020 11:18 AM CDT    Performed On:  9/9/2020 11:18 AM CDT by Linnea Lucas CMA               Summary   Advance Directive :   Yes   Height Measured :   68 in(Converted to: 5 ft 8 in, 172.72 cm)    Systolic Blood Pressure :   136 mmHg (HI)    Diastolic Blood Pressure :   78 mmHg   Mean Arterial Pressure :   97 mmHg   Linnea Lucas CMA - 9/9/2020 11:18 AM CDT

## 2022-02-16 NOTE — TELEPHONE ENCOUNTER
Order, demographics, and notes brought to [facility] per request, patient is aware they will contact him to schedule.

## 2022-02-16 NOTE — NURSING NOTE
Comprehensive Intake Entered On:  10/23/2019 10:57 AM CDT    Performed On:  10/23/2019 10:54 AM CDT by Linnea Lucas CMA               Summary   Chief Complaint :   preop - scheduled for colonoscopy 10/30 with [facility] at [facility]   Advance Directive :   Yes   Height Measured :   68 in(Converted to: 5 ft 8 in, 172.72 cm)    Systolic Blood Pressure :   152 mmHg (HI)    Diastolic Blood Pressure :   70 mmHg   Mean Arterial Pressure :   97 mmHg   Linnea Lucas CMA - 10/23/2019 10:54 AM CDT   Peripheral Pulse Rate :   43 bpm (LOW)    Linnea Lucas CMA - 10/23/2019 10:58 AM CDT     Temperature Tympanic :   97 DegF(Converted to: 36.1 DegC)  (LOW)    Linnea Lucas CMA - 10/23/2019 10:54 AM CDT   Health Status   Allergies Verified? :   Yes   Medication History Verified? :   Yes   Medical History Verified? :   Yes   Pre-Visit Planning Status :   Completed   Tobacco Use? :   Never smoker   Linnea Lucas CMA - 10/23/2019 10:54 AM CDT   Consents   Consent for Immunization Exchange :   Consent Granted   Consent for Immunizations to Providers :   Consent Granted   Linnea Lucas CMA - 10/23/2019 10:54 AM CDT

## 2022-02-16 NOTE — TELEPHONE ENCOUNTER
Entered by Mai Reaves on January 14, 2019 11:12:37 AM CST  ---------------------  From: Mai Reaves   To: [facility]    Sent: 1/14/2019 11:12:37 AM CST  Subject: Medication Management     ** Submitted: **  Order:atorvastatin (atorvastatin 80 mg oral tablet)  1 tab(s)  Oral  daily  Qty:  30 tab(s)        Duration:  30 day(s)        Refills:  0          HA     Route To [facility]    Signed by Mai Reaves  1/14/2019 11:12:00 AM    ** Submitted: **  Complete:atorvastatin (atorvastatin 80 mg oral tablet)   Signed by Mai Reaves  1/14/2019 11:12:00 AM    ** Not Approved:  **  atorvastatin (ATORVASTATIN CALCIUM 80MG TABS)  TAKE ONE TABLET BY MOUTH EVERY DAY  Qty:  90 unknown unit        Days Supply:  90        Refills:  0          HA     Route To [facility]   Signed by Mai Reaves            Med Refill    Date of last office visit and reason:  back pain 12/7/18  Date of last Med Check / Px:   AWV 8/9/17  Date of last labs pertaining to med:  8/9/18  RTC order in chart:  Yes, due now            ------------------------------------------  From: [facility]  To: Eloy Cortez MD  Sent: January 11, 2019 11:34:05 AM CST  Subject: Medication Management  Due: January 12, 2019 11:34:05 AM CST    ** On Hold Pending Signature **  Drug: atorvastatin (atorvastatin 80 mg oral tablet)  TAKE ONE TABLET BY MOUTH EVERY DAY  Quantity: 90 unknown unit  Days Supply: 90        Refills: 0  Substitutions Allowed  Notes from Pharmacy:     Dispensed Drug: atorvastatin (atorvastatin 80 mg oral tablet)  TAKE ONE TABLET BY MOUTH EVERY DAY  Quantity: 90 unknown unit  Days Supply: 90        Refills: 0  Substitutions Allowed  Notes from Pharmacy:   ------------------------------------------

## 2022-02-16 NOTE — TELEPHONE ENCOUNTER
---------------------  From: Linnea Lucas CMA (Phone Messages Pool ([id number])   To: Garima Benedict; Judi Whitehead;     Sent: 10/28/2020 2:41:02 PM CDT  Subject: General Message-lab      Phone Message    PCP:   SAMARIA      Time of Call:  1418       Person Calling:  Sharad at \[facility]  Phone number:  [phone]      Note:   pt had labs done 8/26 with SAMARIA, Neurologist is asking for a copy of pt's labs, specifically his Vitamin D level    Please fax to [phone], Atten:  Sharad    Last office visit and reason:  9/9 AWV---------------------  From: Garima Benedict   To: Phone Messages [employer] ([id number] [facility]);     Sent: 10/28/2020 4:05:14 PM CDT  Subject: RE: General Message-lab      Faxed labs to \[facility] 10/28/2020 @ 3:00pm, Confirmation Received and records documented. thanks

## 2022-02-16 NOTE — LETTER
(Inserted Image. Unable to display)   August 22, 2019      TIGRE NICHOLE  [address]   [place], [place] [id number]                  Result Name Current Result Previous Result Reference Range   Lab Report   8/20/2019   8/9/2018    Cholesterol (mg/dL)  147 8/20/2019   - <200   HDL (mg/dL)  41 8/20/2019  >40 -    Triglyceride (mg/dL)  133 8/20/2019   - <150   LDL  83 8/20/2019     Cholesterol/HDL Ratio  3.6 8/20/2019   - <5.0   Non-HDL Cholesterol  106 8/20/2019   - <130   Glucose Level (mg/dL)  86 8/20/2019  95 8/9/2018 65 - 99   BUN (mg/dL)  21 8/20/2019  20 8/9/2018 7 - 25   Creatinine Level (mg/dL) ((H)) 1.31 8/20/2019  1.13 8/9/2018 0.70 - 1.25   eGFR (mL/min/1.73m2) ((L)) 56 8/20/2019  67 8/9/2018 > OR = 60 -    eGFR  (mL/min/1.73m2)  65 8/20/2019  78 8/9/2018 > OR = 60 -    BUN/Creat Ratio  16 8/20/2019  NOT APPLICABLE 8/9/2018 6 - 22   Sodium Level (mmol/L)  141 8/20/2019  140 8/9/2018 135 - 146   Potassium Level (mmol/L)  4.6 8/20/2019  4.6 8/9/2018 3.5 - 5.3   Chloride Level (mmol/L)  104 8/20/2019  104 8/9/2018 98 - 110   CO2 Level (mmol/L)  31 8/20/2019  30 8/9/2018 20 - 32   Calcium Level (mg/dL)  9.4 8/20/2019  9.8 8/9/2018 8.6 - 10.3   TSH (mIU/L)  0.61 8/20/2019 ((L)) 0.30 8/9/2018 0.40 - 4.50   PSA (ng/mL)  1.5 8/20/2019  1.3 8/9/2018  - < OR = 4.0         Sincerely,        Eloy Cortez MD

## 2022-02-16 NOTE — PROGRESS NOTES
Patient:   TIGRE VARELA            MRN: [id number]            FIN: [id number]               Age:   70 years     Sex:  Male     :  1950   Associated Diagnoses:   None   Author:   Eloy Cortez MD      Visit Information      Date of Service: 2020 09:15 am  Performing Location: [facility]  Encounter#: [id number]      Primary Care Provider (PCP):  Eloy Cortez MD    NPI# [id number]      Referring Provider:  Eloy Cortez MD    NPI# [id number]   Visit type:  Annual exam.       Chief Complaint   2020 9:17 AM CDT     AWV     Medicare Initial / Annual Preventative Visit     HPI:      2020: Jaren returns to clinic for annual wellness evaluation.  Sara passed away earlier this summer and still expectedly grieving.  Finds it he can escape while golfing.  He worries about this coming winter.  Caring for 2 elderly cats.  No other specific complaints or concerns.          Well Adult History     ADL's and Safety were reviewed.  None found.  Please see copy of scanned form.    I have reviewed with the patient the completed health risk assessment and health history form and we have agreed on the following plans for identified risk factors. None found.  Please see copy of scanned form.    No hearing impairment, No problems with Activities of Daily Living, Not at risk for falls and Home is safe.  Patient completed  Health Risk Assessment form and Patient Health History form were reviewed with the patient and any risks identified and plans to deal with these risks are identified in the Assessment and Plan below.  Other Health Care Providers are as currently listed in the Medical Record as a Quick Note for eye care, other medical specialists, health agencies and pharmacy and medical suppliers          Well Adult History             The patient presents for well adult exam.  I've reviewed and agree with above chief complaint and comments   .              Review of Systems   Eye:  See Preventative Services  Checklist for Vision/Glaucoma Test dates..    Ear/Nose/Mouth/Throat:  Hearing is addressed and no impairment noted..    Respiratory:  No shortness of breath.    Cardiovascular:  No chest pain.    Gastrointestinal:  No nausea, No vomiting, No diarrhea.    Genitourinary:  No dysuria.    Musculoskeletal:  No neck pain, No joint pain.    Neurologic:  Alert and oriented X4.    Psychiatric:  GDS Noted  the GDS and the CAGE assessments were reviewed and discussed with the patient.    See completed Health History for Review of Systems.      Health Status   Allergies:    Allergic Reactions (Selected)  Severity Not Documented  Codeine (Hives)   Medications:  (Selected)   Prescriptions  Prescribed  CPAP +6 cm H2o: CPAP +6 cm H2o, See Instructions, Instructions: Heated humidifier, heated tubing, filters, nasal or full face mask of choice with headgear.  Replacement cushions and supplies as needed.  Months = 99 (Lifetime), Supply, # 1 EA, 0 Refill(s), Type: Maint...  Jobst Facioplasty Garment: Jobst Facioplasty Garment, See Instructions, Instructions: use as directed, Supply, # 1 EA, 0 Refill(s), Type: Maintenance  Vitamin D3 1000 intl units oral capsule: 3 cap(s) ( 3,000 International Unit ), po, daily, # 270 cap(s), 0 Refill(s), Type: Maintenance, [facility] 3 cap(s) po daily  atorvastatin 80 mg oral tablet: = 1 tab(s), Oral, daily, # 30 tab(s), 0 Refill(s), Type: Maintenance, [facility], [facility], in, 10/23/19 11:19:00 CDT, Height Measured, 152, lb, 08/27/19 9:40:00 CDT, Weight Measured  baclofen 10 mg oral tablet: 2 tab(s) ( 20 mg ), PO, tid, # 540 tab(s), 3 Refill(s), Type: Maintenance, Pharmacy: [facility] #[id number], 2 tab(s) po tid  citalopram 40 mg oral tablet: = 1 tab(s), Oral, daily, # 30 tab(s), 0 Refill(s), Type: Maintenance, [facility], [facility], in, 10/23/19 11:19:00 CDT, Height Measured, 152, lb, 08/27/19  9:40:00 CDT, Weight Measured  levothyroxine 125 mcg (0.125 mg) oral tablet: = 1 tab(s), Oral, daily, # 30 tab(s), 0 Refill(s), Type: Maintenance, Pharmacy: [facility], 68, in, 10/23/19 11:19:00 CDT, Height Measured, 152, lb, 08/27/19 9:40:00 CDT, Weight Measured  losartan 50 mg oral tablet: = 1.5 tab(s), Oral, daily, # 45 tab(s), 0 Refill(s), Type: Maintenance, Pharmacy: [facility], 68, in, 10/23/19 11:19:00 CDT, Height Measured, 152, lb, 08/27/19 9:40:00 CDT, Weight Measured  nitroglycerin 0.4 mg sublingual tablet: 1 tab(s) ( 0.4 mg ), SL, q5min, PRN: for chest pain, # 10 tab(s), 0 Refill(s), Type: Maintenance, Pharmacy: [facility] #[id number], 1 tab(s) sl q5 min,PRN:for chest pain  Documented Medications  Documented  Copaxone: ( 40 mg ), Subcutaneous, 3x/wk, 0 Refill(s), Type: Maintenance  Multiple Vitamins oral tablet: 1 tab(s), PO, Daily, 0 Refill(s)  aspirin 325 mg oral tablet: 1 tab(s) ( 325 mg ), PO, Daily, 0 Refill(s)  gabapentin 300 mg oral capsule: 1 cap(s) ( 300 mg ), PO, TID, # 90 cap(s), 0 Refill(s), Type: Maintenance,    Medications          *denotes recorded medication          Jobst Facioplasty Garment: See Instructions, use as directed, 1 EA.          CPAP +6 cm H2o: See Instructions, Heated humidifier, heated tubing, filters, nasal or full face mask of choice with headgear.  Replacement cushions and supplies as needed.  Months = 99 (Lifetime), 1 EA, 0 Refill(s).          *aspirin 325 mg oral tablet: 325 mg, 1 tab(s), PO, Daily.          atorvastatin 80 mg oral tablet: 1 tab(s), Oral, daily, 30 tab(s), 0 Refill(s).          baclofen 10 mg oral tablet: 20 mg, 2 tab(s), PO, tid, 540 tab(s), 3 Refill(s).          Vitamin D3 1000 intl units oral capsule: 3,000 International Unit, 3 cap(s), po, daily, 270 cap(s), 0 Refill(s).          citalopram 40 mg oral tablet: 1 tab(s), Oral, daily, 30 tab(s), 0 Refill(s).          *gabapentin 300 mg oral  capsule: 300 mg, 1 cap(s), PO, TID, 90 cap(s), 0 Refill(s).          *Copaxone: 40 mg, Subcutaneous, 3x/wk, 0 Refill(s).          levothyroxine 125 mcg (0.125 mg) oral tablet: 1 tab(s), Oral, daily, 30 tab(s), 0 Refill(s).          losartan 50 mg oral tablet: 1.5 tab(s), Oral, daily, 45 tab(s), 0 Refill(s).          *Multiple Vitamins oral tablet: 1 tab(s), PO, Daily.          nitroglycerin 0.4 mg sublingual tablet: 0.4 mg, 1 tab(s), SL, q5min, 10 tab(s), PRN: for chest pain.       Problem list:    All Problems  Hypothyroid / SNOMED CT 175970368 / Confirmed  H/O laryngeal cancer / SNOMED CT 9854091469 / Confirmed  Hyperlipidemia / SNOMED CT 57774971 / Confirmed  Hyperlipidemia / SNOMED CT 12500830 / Confirmed  MS (multiple sclerosis) / SNOMED CT 00120821 / Confirmed  Obstructive sleep apnea syndrome, moderate / SNOMED CT 900033698 / Confirmed  Raynauds syndrome / SNOMED CT 559325111 / Confirmed  Inactive: Seizures / SNOMED CT 440676415  Resolved: *Hospitalized@[facility] - Pneumonia, probable aspiration  Resolved: Bicipital tendinitis of left shoulder / SNOMED CT 354371939  Resolved: Fx left ankle  Resolved: Hosp for sever flare of MS  Resolved: Inguinal hernia / SNOMED CT 9453886058  Canceled: FHx: Heart Disease / ICD-9-CM V17.49  Canceled: Cancer of base of tongue / SNOMED CT 076937155  Canceled: Pneumonia NOS / ICD-9-     Current Providers and Suppliers Noted in Demographic Area.      Histories   Past Medical History:    Active  Hyperlipidemia (50298427)  MS (multiple sclerosis) (27338895)  Comments:  8/8/2012 CDT 9:33 AM CDT - Linnea Regan  MS with dystonic seizures diagnosed in 1991.  Following an episode of shingles, he had severe exacerbation of MS that lead to a 2-week hospitalization in 1999.  Radha last flare-up was during a trip to [facility] in April of 2003.    8/8/2012 CDT 9:52 AM CDT - Linnea Regan  The MS is followed by Dr. Noriega.  Hyperlipidemia (49596298)  Resolved  *Hospitalized@[facility] - Pneumonia,  probable aspiration: Onset on 2011 at 61 years.  Resolved.  Bicipital tendinitis of left shoulder (352106521): Onset in the month of 10/2005 at 55 years  Resolved.  Comments:  2012 CDT 9:26 AM Linnea Tracy  Left shoulder surgery in .  Hosp for sever flare of MS: Onset in the month of 1999 at 49 years  Resolved.  Inguinal hernia (1037530638):  Resolved on 2010 at 60 years.  Fx left ankle:  Resolved.  Comments:  2012 CDT 9:16 AM HAZELT Linnea Rodriguez  Distal fibular fracture of the right ankle, healed.   Family History:    MI  Father ()  Heart disease  Sister (Salud, )  Comments:  2011 1:11 PM CDT - Neto Drummond MD  CABG  Mother ()  Comments:  2012 9:48 AM Linnea Tracy  CABG performed.  Sister (Estela)  CA - Cancer of ovary  Sister (Salud, )  Gallbladder disease  Mother ()  CABG - Coronary artery bypass graft  Mother ()  Angioplasty  Sister (Salud, ): onset in  at 51 years.  Sister (sEtela): onset in  at 54 years.  Gallbladder  Mother ()  ASHD - Atherosclerotic heart disease  Mother ()  Sister (Estela)     Procedure history:    Colonoscopy (461182112) on 10/30/2019 at 69 Years.  Comments:  2020 7:20 AM HAZELT - Linnea Lucas CMA  normal colonoscopy - repeat in 10 years  Polysomnogram (190521950) on 2018 at 68 Years.  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  Cardiac stress test stage (9952787032) on 2010 at 59 Years.  Comments:  2012 2:00 PM Linnea Tracy  Treadmill echocardiogram is negative for inducible myocardial ischemia.  Normal resting regional and global left ventricular systolic performance with a visually-estimated ejection fraction of 55% to 65%.    2012 1:56 PM Linnea Tracy  Summary:  No evidence of cardiac ischemia on electrocardiographic stress  testing.  Colonoscopy (827082018) on 3/25/2009 at 58 Years.  Comments:  8/29/2014 8:32 AM CDT - Lance HENDERSONLinnea  negative-no polyps/masses  Colonoscopy (097394257) in the month of 2/2008 at 57 Years.  Comments:  7/8/2010 7:58 AM CDT - BERENICE DIAS  Cardiac stress test stage (9445974625) on 8/14/2007 at 57 Years.  Comments:  8/8/2012 9:22 AM CDT - Linnea Regan  The treadmill portion of this Cardiolite stress test was  normal.  Laparoscopic repair of inguinal hernia (32154487) on 3/31/2005 at 54 Years.  Comments:  7/6/2011 12:25 PM CDT - Lance HENDERSONLinnea  Left  Flexible sigmoidoscopy (64094335) on 2/19/2004 at 53 Years.  Left shoulder surgery in 1990 at 40 Years.  Left knee surgery in 1970 at 20 Years.  T & A.   Social History:        Alcohol Assessment            Current, Beer (12 oz), 6 times per week, 1 drinks/episode average.  2 drinks/episode maximum.      Tobacco Assessment            Past, 5 year(s).                     Comments:                      08/08/2012 - Lance HENDERSONLinnea                     Quit at age 28yo      Substance Abuse Assessment            Past, Marijuana      Employment and Education Assessment            Employed, Work/School description: .      Home and Environment Assessment            Marital status: .  Spouse/Partner name: Sara Mancera.      Exercise and Physical Activity Assessment            Exercise frequency: 6-7 x/week.  Exercise type: Weight lifting, treadmill.      Sexual Assessment            Sexually active: Yes.  Sexual orientation: Heterosexual.        Physical Examination   Exam at Provider Discretion   Vital Signs   9/9/2020 9:17 AM CDT Temperature Tympanic 96.2 DegF  LOW    Peripheral Pulse Rate 60 bpm    Systolic Blood Pressure 166 mmHg  HI    Diastolic Blood Pressure 78 mmHg    Mean Arterial Pressure 107 mmHg      Eye:  Extraocular movements are intact.    HENT:  Normocephalic, Tympanic membranes are clear, Oral mucosa is moist.     Neck:  Supple, left sided incisional scar at previous radical resection.    Respiratory:  Lungs are clear to auscultation, Respirations are non-labored, Breath sounds are equal.    Cardiovascular:  Normal rate, Regular rhythm, No edema.    Gastrointestinal:  Soft, Non-tender, Non-distended, Normal bowel sounds.    Lymphatics:  No lymphadenopathy neck, axilla, groin.    Musculoskeletal:  Normal range of motion.    Neurologic:  Alert, Oriented, Normal motor function, No focal deficits, No signs of cognitive impairment..    Psychiatric:  Appropriate mood & affect.       Review / Management   Results review:  INCLUDE PHQ 9.       Impression and Plan   Diagnosis     Medicare Initial / Annual Wellness Visit.     Course:  Progressing as expected.    Plan:  Please list plan for positive findings, treatment options, risk vs. benefits..    Patient Instructions:       Counseled: Patient, Discussed preventative services including  Screening for AAA (Family history or male 65-70 who has smoked more than 100 cigarettes in a lifetime.), Lipid screening, Diabetes screening, Nutrition, Bone mass, Cancer screening (cervical, breast, colon), Immunizations (flu, Pneumovax, Hep. B, Zostavax), Glaucoma screening and ECG if needed.  Appropriate weight and diet discussed.  Discussed Advance Life Directives.    Preventative services checklist reviewed, updated and copy given to patient.  Please see scanned document.       .      .) hypothyroidism   - elevated TSH   - advised readjusting timing of thyroid medication and will recheck in 3 months    .) chronic fatigue, some endorsed sleep apneic features (witnessed pauses, heavy snoring)   - h/o laryngeal resection   - sleep study ('18) demonstrating moderate sleep apnea - opting not to pursue CPAP at this time    .) multiple sclerosis, relapsing/remitting - follows with neurology   - on glatiramer    - takes baclofen for spasticity - well controlled    .) laryngeal Ca - resected in '11 -  follows through [facility] remotely   - HPV related   - no further surveillance imaging    .) HLPD   - strong family h/o CAD   - on ASA 325mg daily, atorvastatin 80mg qhs   - follows with Dr. Rubén Drummond (cardio)   - normal stress echo, 2019    .) depression - on citalopram - he asked to increase dose to 40mg daily after Sara's death    .) health maintenance   - CRC due '29   - continue annual PSA testing   - completed Shingrix   - expected grieving with passing of wife in summer, 2020 (lung cancer)    RTC annually

## 2022-02-16 NOTE — PROGRESS NOTES
Chief Complaint    sleep study results  History of Present Illness      Patient here for follow-up of in lab CPAP titration.  He has symptomatic moderate obstructive sleep apnea.  He felt like he had difficulty sleeping on the night of the titration.  Review of Systems      His Dalton score was 7.  Nocturia once per night.  No heartburn or headaches.  No chest pain, dyspnea, edema, dysphasia.  Right-sided numbness and unsteadiness due to his MS.  Physical Exam   Vitals & Measurements    HR: 57(Peripheral)  BP: 117/72       Patient is alert and oriented and in no distress.  Assessment/Plan       H/O laryngeal cancer(Z85.21)         Orders:          82111 office outpatient visit 15 minutes (Charge), Quantity: 1, Obstructive sleep apnea syndrome, moderate  MS (multiple sclerosis)  H/O laryngeal cancer       MS (multiple sclerosis)(G35)         Orders:          61586 office outpatient visit 15 minutes (Charge), Quantity: 1, Obstructive sleep apnea syndrome, moderate  MS (multiple sclerosis)  H/O laryngeal cancer       Obstructive sleep apnea syndrome, moderate(G47.33)        Patient has symptomatic moderate obstructive sleep apnea.  Start CPAP at 6 cm of water pressure as indicated by his titration.         Orders:          Miscellaneous Rx Supply, CPAP +6 cm H2o, See Instructions, Instructions: Heated humidifier, heated tubing, filters, nasal or full face mask of choice with headgear. Replacement cushions and supplies as needed. Months = 99 (Lifetime), Supply, # 1 EA, 0 Refill(s), Type: Maint..., (Ordered)          26363 office outpatient visit 15 minutes (Charge), Quantity: 1, Obstructive sleep apnea syndrome, moderate  MS (multiple sclerosis)  H/O laryngeal cancer  Patient Information     Name:TIGRE VARELA TRAVIS      Address:      [address] [place] [address]       Sex:Male      YOB: 1950      [phone]      Emergency Contact:JUAN M VARELA     MRN:[id number]     FIN:[id number]     [facility]     Date of Service:10/01/2018      Primary Care Physician:       Diego KHAN, Eloy, [phone]      Attending Physician:       Mohsen Goldman MD, [phone]  Problem List/Past Medical History    Ongoing     H/O laryngeal cancer     Hyperlipidemia     Hyperlipidemia     Hypothyroid     MS (multiple sclerosis)       Comments: The MS is followed by Dr. Noriega. MS with dystonic seizures diagnosed in 1991. Following an episode of shingles, he had severe exacerbation of MS that lead to a 2-week hospitalization in 1999. Radha last flare-up was during a trip to [facility] in April of 2003.     Obstructive sleep apnea syndrome, moderate       Comments: CPAP titration in lab 9/1/18 to 6 cm water pressure. On HST AHI 24 and oximetry maxine 80%.     Raynauds syndrome     Seizures    Historical     *Hospitalized@[facility] - Pneumonia, probable aspiration     Bicipital tendinitis of left shoulder       Comments: Left shoulder surgery in 1990.     Fx left ankle       Comments: Distal fibular fracture of the right ankle, healed.     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        Multiple Vitamins oral tablet: 1 tab(s), PO, Daily.        aspirin 325 mg oral tablet: 325 mg, 1 tab(s), PO, Daily.        Jobst Facioplasty Garment: See Instructions, use as directed, 1 EA.        nitroglycerin 0.4 mg sublingual tablet: 0.4 mg, 1 tab(s), SL, q5min, 10 tab(s), PRN: for chest pain.        baclofen 10 mg oral tablet: 20 mg, 2 tab(s), PO, tid, 540 tab(s), 3  Refill(s).        gabapentin 300 mg oral capsule: 300 mg, 1 cap(s), PO, TID, 90 cap(s), 0 Refill(s).        Vitamin D3 1000 intl units oral capsule: 3,000 International Unit, 3 cap(s), po, daily, 270 cap(s), 0 Refill(s).        citalopram 20 mg oral tablet: 20 mg, 1 tab(s), PO, Daily, 90 tab(s), 1 Refill(s).        Metoprolol Succinate  mg oral tablet, extended release: 50 mg, 0.5 tab(s), po, daily, 45 tab(s), 3 Refill(s).        levothyroxine 125 mcg (0.125 mg) oral tablet: 125 mcg, 1 tab(s), po, daily, 90 tab(s), 3 Refill(s).        atorvastatin 80 mg oral tablet: 80 mg, 1 tab(s), po, daily, 90 tab(s), 3 Refill(s).        losartan 50 mg oral tablet: 75 mg, 1.5 tab(s), po, daily, 45 tab(s), 1 Refill(s).        CPAP +6 cm H2o: See Instructions, Heated humidifier, heated tubing, filters, nasal or full face mask of choice with headgear.  Replacement cushions and supplies as needed.  Months = 99 (Lifetime), 1 EA, 0 Refill(s).                Allergies    codeine (Hives)  Social History    Smoking Status - 10/01/2018     Former smoker     Alcohol      Current, Beer (12 oz), 6 times per week, 1 drinks/episode average., 10/16/2017     Employment and Education      Employed, Work/School description: ., 09/26/2015     Exercise and Physical Activity      Exercise frequency: 6-7 x/week. Exercise type: Weight lifting, treadmill., 11/02/2016     Home and Environment      Marital status: . Spouse/Partner name: Sara Mancera., 09/26/2015     Sexual      Sexually active: Yes. Sexual orientation: Heterosexual., 09/26/2015     Substance Abuse      Past, Marijuana, 08/08/2012     Tobacco      Past, 5 year(s)., 09/04/2013  Family History    ASHD - Atherosclerotic heart disease: Mother and Sister.    Angioplasty: Sister (Dx at 51 years) and Sister (Dx at 54 years).    CA - Cancer of ovary: Sister.    CABG - Coronary artery bypass graft: Mother.    Gallbladder: Mother.    Gallbladder disease: Mother.     Heart disease: Mother, Sister and Sister.    MI: Father.    Sister: History is negative  Immunizations      Vaccine Date Status Comments      influenza virus vaccine, inactivated 10/11/2017 Given      pneumococcal (PCV13) 10/11/2017 Given      influenza virus vaccine, inactivated 10/05/2016 Given      influenza virus vaccine, inactivated 09/14/2015 Given      influenza virus vaccine, inactivated 09/14/2015 Given      influenza virus vaccine, inactivated 08/29/2014 Given      tetanus/diphth/pertuss (Tdap) adult/adol 08/29/2014 Given      ZOS, shingles 09/04/2013 Given      influenza virus vaccine, inactivated 09/04/2013 Given      influenza virus vaccine, inactivated 10/04/2011 Given [10/4/2011] Ok per Dr. Drummond.      pneumococcal (PPSV23) 07/29/2011 Recorded      influenza 10/08/2010 Given      influenza, H1N1, inactivated 11/25/2009 Recorded      Hep A 02/21/2003 Recorded      Td 02/01/2003 Recorded  Lab Results          Lab Results (Last 4 results within 90 days)           Sodium Level: 140 mmol/L [135 mmol/L - 146 mmol/L] (08/09/18 13:55:00)          Potassium Level: 4.6 mmol/L [3.5 mmol/L - 5.3 mmol/L] (08/09/18 13:55:00)          Chloride Level: 104 mmol/L [98 mmol/L - 110 mmol/L] (08/09/18 13:55:00)          CO2 Level: 30 mmol/L [20 mmol/L - 32 mmol/L] (08/09/18 13:55:00)          Glucose Level: 95 mg/dL [65 mg/dL - 99 mg/dL] (08/09/18 13:55:00)          BUN: 20 mg/dL [7 mg/dL - 25 mg/dL] (08/09/18 13:55:00)          Creatinine Level: 1.13 mg/dL [0.7 mg/dL - 1.25 mg/dL] (08/09/18 13:55:00)          BUN/Creat Ratio: NOT APPLICABLE [6  - 22] (08/09/18 13:55:00)          eGFR: 67 mL/min/1.73m2 [8.6 mg/dL - 10.3 mg/dL] (08/09/18 13:55:00)          eGFR African American: 78 mL/min/1.73m2 [6  - 22] (08/09/18 13:55:00)          Calcium Level: 9.8 mg/dL [8.6 mg/dL - 10.3 mg/dL] (08/09/18 13:55:00)          TSH: 0.3 mIU/L Low [0.4 mIU/L - 4.5 mIU/L] (08/09/18 13:55:00)          PSA: 1.3 ng/mL [3.5 mmol/L - 5.3  mmol/L] (08/09/18 13:55:00)  Diagnostic Results   CPAP titration to 8 cm of water pressure.

## 2022-02-16 NOTE — TELEPHONE ENCOUNTER
Entered by Caty Cordova CMA on July 31, 2019 11:21:40 AM CDT  ---------------------  From: Caty Cordova CMA   To: [facility]    Sent: 7/31/2019 11:21:39 AM CDT  Subject: Medication Management     ** Submitted: **  Order:levothyroxine (levothyroxine 125 mcg (0.125 mg) oral tablet)  1 tab(s)  Oral  daily  Qty:  30 tab(s)        Refills:  0          Substitutions Allowed     Route To Pharmacy - [facility]    Signed by Caty Cordova CMA  7/31/2019 11:21:00 AM    ** Submitted: **  Complete:levothyroxine (levothyroxine 125 mcg (0.125 mg) oral tablet)   Signed by Caty Cordova CMA  7/31/2019 11:21:00 AM    ** Not Approved:  **  levothyroxine (LEVOTHYROXINE SODIUM 125MCG TABS)  TAKE ONE TABLET BY MOUTH EVERY DAY  Qty:  90 unknown unit        Days Supply:  90        Refills:  2          Substitutions Allowed     Route To Pharmacy - [facility]   Signed by Caty Cordova CMA            ------------------------------------------  From: [facility]  To: Eloy Cortez MD  Sent: July 30, 2019 12:59:17 PM CDT  Subject: Medication Management  Due: July 31, 2019 12:59:17 PM CDT    ** On Hold Pending Signature **  Drug: levothyroxine (levothyroxine 125 mcg (0.125 mg) oral tablet)  TAKE ONE TABLET BY MOUTH EVERY DAY  Quantity: 90 unknown unit  Days Supply: 90        Refills: 2  Substitutions Allowed  Notes from Pharmacy:     Dispensed Drug: levothyroxine (levothyroxine 125 mcg (0.125 mg) oral tablet)  TAKE ONE TABLET BY MOUTH EVERY DAY  Quantity: 90 unknown unit  Days Supply: 90        Refills: 2  Substitutions Allowed  Notes from Pharmacy:   ------------------------------------------Date of last office visit and reason:  12/17/18 back pain      Date of last Med Check / Px:   08/2018  Date of last labs pertaining to med:  8/20/2018    RTC order in chart:  yes, due 08/2019 for annual    For Protocol refill, has patient been contacted:  letter  sending, message sent to pharmacy.

## 2022-02-16 NOTE — PROGRESS NOTES
Patient:   TIGRE VARELA            MRN: [id number]            FIN: [id number]               Age:   68 years     Sex:  Male     :  1950   Associated Diagnoses:   None   Author:   Eloy Cortez MD      Visit Information      Date of Service: 2018 12:40 pm  Performing Location: [facility]  Encounter#: [id number]      Primary Care Provider (PCP):  Eloy Cortez MD    NPI# [id number]   Visit type:  Annual exam.       Chief Complaint   2018 12:51 PM CDT    AWV. Concerns w/ spots on the left side of face.     Medicare Initial / Annual Preventative Visit     HPI:      2018: Jaren returns to clinic for annual wellness evaluation.  He is now Medicare enrolled and retired as of the spring.  Overall doing well.  Primary concerns related to persistent daytime fatigue.  Sleeps approximately 7-8 hours per night.  There has been concerns brought up by his spouse about potential sleep apnea features.  He has regular loud snoring and question of observed apneic features.  There was attempts at obtaining sleep study several years ago though was not fully pursued given her relatively low [place] sleepiness index.  He feels like since he is retired now he should not have any real reason for continued fatigue.  He was recommended to receive Shinrix by his neurologist.  Describes several skin lesions of concern on his face.         Well Adult History     ADL's and Safety were reviewed.  None found.  Please see copy of scanned form.    I have reviewed with the patient the completed health risk assessment and health history form and we have agreed on the following plans for identified risk factors. None found.  Please see copy of scanned form.    No hearing impairment, No problems with Activities of Daily Living, Not at risk for falls and Home is safe.  Patient completed  Health Risk Assessment form and Patient Health History form were reviewed with the patient and any risks identified and plans to deal with  these risks are identified in the Assessment and Plan below.  Other Health Care Providers are as currently listed in the Medical Record as a Quick Note for eye care, other medical specialists, health agencies and pharmacy and medical suppliers          Well Adult History             The patient presents for well adult exam.  I've reviewed and agree with above chief complaint and comments   .              Review of Systems   Eye:  See Preventative Services Checklist for Vision/Glaucoma Test dates..    Ear/Nose/Mouth/Throat:  Hearing is addressed and no impairment noted..    Respiratory:  No shortness of breath.    Cardiovascular:  No chest pain.    Gastrointestinal:  No nausea, No vomiting, No diarrhea.    Genitourinary:  No dysuria.    Musculoskeletal:  No neck pain, No joint pain.    Neurologic:  Alert and oriented X4.    Psychiatric:  GDS Noted  the GDS and the CAGE assessments were reviewed and discussed with the patient.    See completed Health History for Review of Systems.      Health Status   Allergies:    Allergic Reactions (Selected)  Severity Not Documented  Codeine (Hives)   Medications:  (Selected)   Prescriptions  Prescribed  Jobst Facioplasty Garment: Jobst Facioplasty Garment, See Instructions, Instructions: use as directed, Supply, # 1 EA, 0 Refill(s), Type: Maintenance  Metoprolol Succinate  mg oral tablet, extended release: 0.5 tab(s) ( 50 mg ), po, daily, # 45 tab(s), 3 Refill(s), Type: Maintenance, Pharmacy: [facility], 0.5 tab(s) po daily  Vitamin D3 1000 intl units oral capsule: 3 cap(s) ( 3,000 International Unit ), po, daily, # 270 cap(s), 0 Refill(s), Type: Maintenance, Pharmacy: [facility], 3 cap(s) po daily  atorvastatin 80 mg oral tablet: 1 tab(s) ( 80 mg ), po, daily, # 90 tab(s), 3 Refill(s), Type: Maintenance, Pharmacy: [facility], 1 tab(s) po daily  baclofen 10 mg oral tablet: 2 tab(s) ( 20 mg ), PO, tid,  # 540 tab(s), 3 Refill(s), Type: Maintenance, Pharmacy: [id number], 2 tab(s) po tid  citalopram 20 mg oral tablet: 1 tab(s) ( 20 mg ), PO, Daily, # 90 tab(s), 1 Refill(s), Type: Maintenance, Pharmacy: [facility], Plans to stop after 6 more months, 1 tab(s) po daily  levothyroxine 125 mcg (0.125 mg) oral tablet: 1 tab(s) ( 125 mcg ), po, daily, # 90 tab(s), 3 Refill(s), Type: Maintenance, Pharmacy: [facility], 1 tab(s) po daily  losartan 50 mg oral tablet: 1.5 tab(s) ( 75 mg ), po, daily, # 45 tab(s), 1 Refill(s), Type: Maintenance, Pharmacy: [facility], 1.5 tab(s) po daily  nitroglycerin 0.4 mg sublingual tablet: 1 tab(s) ( 0.4 mg ), SL, q5min, PRN: for chest pain, # 10 tab(s), 0 Refill(s), Type: Maintenance, Pharmacy: [id number], 1 tab(s) sl q5 min,PRN:for chest pain  Documented Medications  Documented  Copaxone: ( 40 mg ), Subcutaneous, 3x/wk, 0 Refill(s), Type: Maintenance  Multiple Vitamins oral tablet: 1 tab(s), PO, Daily, 0 Refill(s)  aspirin 325 mg oral tablet: 1 tab(s) ( 325 mg ), PO, Daily, 0 Refill(s)  gabapentin 300 mg oral capsule: 1 cap(s) ( 300 mg ), PO, TID, # 90 cap(s), 0 Refill(s), Type: Maintenance   Problem list:    All Problems  Hypothyroid / SNOMED CT 822630534 / Confirmed  Bicipital tendinitis of left shoulder / SNOMED CT 719403224 / Confirmed  Hyperlipidemia / SNOMED CT 89131195 / Confirmed  hyperlipidemia / Confirmed  Cancer of base of tongue / SNOMED CT 258757111 / Confirmed  MS (multiple sclerosis) / SNOMED CT 52882732 / Confirmed  Inactive: Seizures / SNOMED CT 409383512  Resolved: *Hospitalized@[facility] - Pneumonia, probable aspiration  Resolved: Fx left ankle  Resolved: Hosp for sever flare of MS  Resolved: Inguinal hernia / SNOMED CT 2247475034  Canceled: FHx: Heart Disease / ICD-9-CM V17.49  Canceled: Pneumonia NOS / ICD-9-     Current Providers and Suppliers Noted in  Demographic Area.      Histories   Past Medical History:    Active  Bicipital tendinitis of left shoulder (033314153): Onset in the month of 10/2005 at 55 years  Comments:  2012 CDT 9:26 AM CDT - Linnea Regan  Left shoulder surgery in .  Hyperlipidemia (47483781)  MS (multiple sclerosis) (80138932)  Comments:  2012 CDT 9:33 AM CDT Linnea Rodriguez  MS with dystonic seizures diagnosed in .  Following an episode of shingles, he had severe exacerbation of MS that lead to a 2-week hospitalization in .  [facility] last flare-up was during a trip to [facility] in 2003.    2012 CDT 9:52 AM CDT - Linnea Regan  The MS is followed by Dr. Noriega.  Cancer of base of tongue (092838571)  hyperlipidemia  Resolved  *Hospitalized@[facility] - Pneumonia, probable aspiration: Onset on 2011 at 61 years.  Resolved.  Hosp for sever flare of MS: Onset in the month of 1999 at 49 years  Resolved.  Inguinal hernia (3195586923):  Resolved on 2010 at 60 years.  Fx left ankle:  Resolved.  Comments:  2012 CDT 9:16 AM CDT - Linnea Regan  Distal fibular fracture of the right ankle, healed.   Family History:    MI  Father ()  Heart disease  Sister (Salud, )  Comments:  2011 1:11 PM - Neto Drummond MD  CABG  Mother ()  Comments:  2012 9:48 AM - Linnea Regan  CABG performed.  Sister (Estela)  CA - Cancer of ovary  Sister (Salud, )  Gallbladder disease  Mother ()  CABG - Coronary artery bypass graft  Mother ()  Angioplasty  Sister (Salud, ): onset in  at 51 years.  Sister (Estela): onset in  at 54 years.  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  Cardiac stress test stage (3967598378) on 2010 at 59 Years.  Comments:  2012 2:00 PM - Linnea Regan  Treadmill echocardiogram is negative  for inducible myocardial ischemia.  Normal resting regional and global left ventricular systolic performance with a visually-estimated ejection fraction of 55% to 65%.    8/7/2012 1:56 PM - Linnea Regan  Summary:  No evidence of cardiac ischemia on electrocardiographic stress testing.  Colonoscopy ([id number]) on 3/25/2009 at 58 Years.  Comments:  8/29/2014 8:32 AM - Linnea Lucas CMA  negative-no polyps/masses  Colonoscopy ([id number]) in the month of 2/2008 at 57 Years.  Comments:  7/8/2010 7:58 AM - BERENICE DIAS  Cardiac stress test stage (3460416788) on 8/14/2007 at 57 Years.  Comments:  8/8/2012 9:22 AM - Linnea Regan  The treadmill portion of this Cardiolite stress test was  normal.  Laparoscopic repair of inguinal hernia (13050699) on 3/31/2005 at 54 Years.  Comments:  7/6/2011 12:25 PM - Linnea Lucas CMA  Left  Flexible sigmoidoscopy ([id number]) on 2/19/2004 at 53 Years.  Left shoulder surgery in 1990 at 40 Years.  Left knee surgery in 1970 at 20 Years.  T & A.   Social History:        Alcohol Assessment            Current, Beer (12 oz), 6 times per week, 1 drinks/episode average.      Tobacco Assessment            Past, 5 year(s).                     Comments:                      08/08/2012 - Linnea Lucas CMA                     Quit at age 26yo      Substance Abuse Assessment            Past, Marijuana      Employment and Education Assessment            Employed, Work/School description: .      Home and Environment Assessment            Marital status: .  Spouse/Partner name: Sara Mancera.      Exercise and Physical Activity Assessment            Exercise frequency: 6-7 x/week.  Exercise type: Weight lifting, treadmill.      Sexual Assessment            Sexually active: Yes.  Sexual orientation: Heterosexual.        Physical Examination   Exam at Provider Discretion   Vital Signs   8/9/2018 12:51 PM CDT Temperature Tympanic 98.1 DegF    Peripheral Pulse Rate 60 bpm     Systolic Blood Pressure 126 mmHg    Diastolic Blood Pressure 80 mmHg    Mean Arterial Pressure 95 mmHg    BP Site Right arm    BP Method Manual    Oxygen Saturation 99 %      Eye:  Extraocular movements are intact.    HENT:  Normocephalic, Oral mucosa is moist.    Neck:  Supple.    Respiratory:  Lungs are clear to auscultation, Respirations are non-labored, Breath sounds are equal.    Cardiovascular:  Normal rate, Regular rhythm, No edema.    Gastrointestinal:  Soft, Non-tender, Non-distended, Normal bowel sounds.    Lymphatics:  No lymphadenopathy neck, axilla, groin.    Musculoskeletal:  Normal range of motion.    Integumentary:  benign appearing skin lesions on face; several scattered nevi.    Neurologic:  Alert, Oriented, Normal motor function, No focal deficits, No signs of cognitive impairment..    Psychiatric:  Appropriate mood & affect.       Review / Management   Results review:  INCLUDE PHQ 9.       Impression and Plan   Diagnosis     Medicare Initial / Annual Wellness Visit.     Course:  Progressing as expected.    Plan:  Please list plan for positive findings, treatment options, risk vs. benefits..    Patient Instructions:       Counseled: Patient, Discussed preventative services including  Screening for AAA (Family history or male 65-70 who has smoked more than 100 cigarettes in a lifetime.), Lipid screening, Diabetes screening, Nutrition, Bone mass, Cancer screening (cervical, breast, colon), Immunizations (flu, Pneumovax, Hep. B, Zostavax), Glaucoma screening and ECG if needed.  Appropriate weight and diet discussed.  Discussed Advance Life Directives.    Preventative services checklist reviewed, updated and copy given to patient.  Please see scanned document.       .    .) Raynauds phenomena   - general reassurance   - recommending hand warmers/gloves - could consider CCB in the future if developing more pains    .) chronic fatigue, some endorsed sleep apneic features (witnessed pauses, heavy  snoring)   - h/o laryngeal resection   - will get scheduled for sleep study    .) multiple sclerosis, relapsing/remitting - follows with Dr. 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 Rubén Drummond (cardio)   - normal stress echo in 2/2015    .) health maintenance   - skin surveillance exam   - CRC due in 2019   - continue annual PSA testing   - hypothyroid; recheck TSH   - tapered off citalopram ('17) - no ill effects   - screening labs in 11/2017: BS 86, HDL 51, LDL 79   - normal vitamin D levels   - strongly recommending Shinrix     RTC annually   Chonodrocutaneous Helical Advancement Flap Text: The defect edges were debeveled with a #15 scalpel blade.  Given the location of the defect and the proximity to free margins a chondrocutaneous helical advancement flap was deemed most appropriate.  Using a sterile surgical marker, the appropriate advancement flap was drawn incorporating the defect and placing the expected incisions within the relaxed skin tension lines where possible.    The area thus outlined was incised deep to adipose tissue with a #15 scalpel blade.  The skin margins were undermined to an appropriate distance in all directions utilizing iris scissors.

## 2022-02-16 NOTE — TELEPHONE ENCOUNTER
---------------------  From: Matilda Nevarez MA (Phone Messages Pool ([id number]_[facility]))   To: SAMARIA Message Pool ([id number]_[place] [facility] [place]);     Sent: 10/4/2021 1:55:23 PM CDT  Subject: COVID booster     Pt has appt w/ SAMARIA 10/8/2021 and would like to get flu shot at appt.  He also has questions about the COVID booster.  Last dose given 2/24/2021.  Should he get same time as flu shot or wait?  [phone] with Jaren that typically don't recommend doing together, will  discuss more Friday

## 2022-02-16 NOTE — LETTER
(Inserted Image. Unable to display)     August 12, 2019      TIGRE VARELA  [address]  [place], [place] [id number]          Dear TIGRE,      Thank you for selecting [facility] ([facility]viously [facility] for your healthcare needs.    Our records indicate you are due for the following services:    Medicare Annual Wellness Visit.    Non-Fasting Labs.    If you had your labs done at another facility or with Direct Access Lab Testing at [facility], please bring in a copy of the results to your next visit, mail a copy, or drop off a copy of your results to your Healthcare Provider.    You are due for lab work and an office visit; please schedule the lab appointment 1 week before the office visit.  This will assure all results are available to discuss with your provider during your visit.    **It is very helpful if you bring your medication bottles to your appointment.  This assures we have all of your current medications, including strength and dosing information, documented accurately in your medical record.    To schedule an appointment or if you have further questions, please contact your primary clinic:   Critical access hospital       [phone]   [facility]       [phone]              [facility]     [phone]      Powered by [employer] and [email or web address]    Sincerely,    Eloy Cortez MD

## 2022-02-16 NOTE — NURSING NOTE
Comprehensive Intake Entered On:  10/8/2021 12:49 PM CDT    Performed On:  10/8/2021 12:44 PM CDT by Linnea Lucas CMA               Summary   Chief Complaint :   f/u chronic -review labs - started on amlodipine recently.  Angiogram done last month - no stenting needed.     Advance Directive :   Yes   Weight Measured :   154.7 lb(Converted to: 154 lb 11 oz, 70.171 kg)    Height Measured :   568 in(Converted to: 47 ft 4 in, 1,442.72 cm)    Body Mass Index :   0.34 kg/m2 (LOW)    Body Surface Area :   5.3 m2   Systolic Blood Pressure :   156 mmHg (HI)    Diastolic Blood Pressure :   91 mmHg (HI)    Mean Arterial Pressure :   113 mmHg   Peripheral Pulse Rate :   65 bpm   Temperature Tympanic :   98.7 DegF(Converted to: 37.1 DegC)    Oxygen Saturation :   98 %   Linnea Lucas CMA - 10/8/2021 12:44 PM CDT   Health Status   Allergies Verified? :   Yes   Medication History Verified? :   Yes   Medical History Verified? :   Yes   Pre-Visit Planning Status :   Completed   Tobacco Use? :   Former smoker   Linnea Lucas CMA - 10/8/2021 12:44 PM CDT   Consents   Consent for Immunization Exchange :   Consent Granted   Consent for Immunizations to Providers :   Consent Granted   Linnea Lucas CMA - 10/8/2021 12:44 PM CDT   Family History   Family History   (As Of: 10/8/2021 12:49:12 PM CDT)   Aunt:   Relation:   Aunt ;           Nomenclature:   Breast cancer ; Value:   Positive            Nomenclature:   Migraine ; Value:   Positive            Nomenclature:   Multiple sclerosis ; Value:   Positive          Sister: Agustina  Full Name:   Agustina ; Relation:   Sister ; Gender:   Female ;  YOB: 1956 12:00:00 AM CST ; Negative History          Mother:   Relation:   Mother ; Gender:   Female ;  ; Age at Death:    70 Years            Nomenclature:   CABG - Coronary artery bypass graft ; Value:   Positive            Nomenclature:   Gallbladder disease ; Value:   Positive            Nomenclature:   Heart disease ;  Comments:   2012 9:48 AM CDT - Linnea Regan  CABG performed. ; Value:   Positive            Nomenclature:   Hypertension ; Value:   Positive          Father:   Relation:   Father ; Gender:   Male ;  ; Age at Death:    52 Years ; Cause of Death:   Heart attack            Nomenclature:   MI ; Value:   Positive            Nomenclature:   Heart disease ; Value:   Positive          Sister: Estela  Full Name:   Estela ; Relation:   Sister ; Gender:   Female ;  YOB: 1946 12:00:00 AM CST            Nomenclature:   ASHD - Atherosclerotic heart disease ; Value:   Positive            Nomenclature:   Angioplasty ; Condition Onset Age:    56 Years ; Value:   Positive            Nomenclature:   Heart disease ; Value:   Positive          Sister: Salud  Full Name:   Salud ; Relation:   Sister ; Gender:   Female ;  ; Age at Death:    60 Years ; Cause of Death:   ovarian cancer ; YOB: 1948 12:00:00 AM CST            Nomenclature:   CA - Cancer of ovary ; Value:   Positive            Nomenclature:   Angioplasty ; Condition Onset Age:    53 Years ; Value:   Positive            Nomenclature:   Heart disease ; Comments:   2011 1:11 PM CDT - Neto Drummond MD  CABG ; Value:   Positive            Social History   Social History   (As Of: 10/8/2021 12:49:12 PM CDT)   Alcohol:        Current, Beer (12 oz), Daily, 1 drinks/episode average.  2 drinks/episode maximum.  Ready to change: No.   (Last Updated: 2020 11:28:14 AM CDT by Miladys Krishnamurthy)          Tobacco:        Former smoker, quit more than 30 days ago, Cigarettes, 10 per day.  7 year(s).  Household tobacco concerns: No.   Comments:  2020 11:27 AM - Miladys Krishnamurthy: Quit in    (Last Updated: 2020 11:27:51 AM CDT by Miladys Krishnamurthy)   Former smoker, quit more than 30 days ago   (Last Updated: 10/8/2021 12:45:29 PM CDT by Linnea Lucas CMA)          Electronic Cigarette/Vaping:        Electronic Cigarette Use: Never.    (Last Updated: 10/8/2021 12:45:35 PM CDT by Linnea Lucas CMA)          Substance Abuse:        Current, Marijuana, 3-5 times per week, Ready to change: No.   (Last Updated: 9/11/2020 11:28:38 AM CDT by Miladys Krishnamurthy)          Employment/School:        Retired, Work/School description: .  Highest education level: University degree(s).   (Last Updated: 9/11/2020 11:29:45 AM CDT by Miladys Krishnamurthy)          Home/Environment:        Marital status: .  Living situation: Home/Independent.  Injuries/Abuse/Neglect in household: No.  Feels unsafe at home: No.  Family/Friends available for support: Yes.   (Last Updated: 9/11/2020 11:30:49 AM CDT by Miladys Krishnamurthy)          Nutrition/Health:        Type of diet: Regular.  Wants to lose weight: No.  Sleeping concerns: No.  Feels highly stressed: No.   (Last Updated: 9/11/2020 11:28:58 AM CDT by Miladys Krishnamurthy)          Exercise:        Exercise frequency: Daily.  Exercise type: Walking, Weight lifting.   (Last Updated: 9/11/2020 11:29:24 AM CDT by Miladys Krishnamurthy)          Sexual:        Sexually active: No.  Identifies as male, Sexual orientation: Straight or heterosexual.  History of STD: No.  Contraceptive Use Details: None.  History of sexual abuse: No.   (Last Updated: 9/11/2020 11:30:14 AM CDT by Miladys Krishnamurthy)

## 2022-02-16 NOTE — PROGRESS NOTES
Patient:   TIGRE VARELA            MRN: [id number]            FIN: [id number]               Age:   68 years     Sex:  Male     :  1950   Associated Diagnoses:   Low back pain   Author:   Hao Jones PA-C      Chief Complaint   2018 8:59 AM CST   Pt here for low back pain x 7 days.      History of Present Illness   Chief complaint and symptoms noted above and confirmed with patient   no acute injury,  having sharp spasms, no radiation down legs  no problems with BMs or urination  taking ibuprofen, lidocaine patch, heat         Review of Systems   Constitutional:  Negative.    Ear/Nose/Mouth/Throat:  Negative.    Respiratory:  Negative.    Musculoskeletal:       Back pain: In the lower region.       Health Status   Allergies:    Allergic Reactions (Selected)  Severity Not Documented  Codeine (Hives)   Medications:  (Selected)   Prescriptions  Prescribed  CPAP +6 cm H2o: CPAP +6 cm H2o, See Instructions, Instructions: Heated humidifier, heated tubing, filters, nasal or full face mask of choice with headgear.  Replacement cushions and supplies as needed.  Months = 99 (Lifetime), Supply, # 1 EA, 0 Refill(s), Type: Maint...  Jobst Facioplasty Garment: Jobst Facioplasty Garment, See Instructions, Instructions: use as directed, Supply, # 1 EA, 0 Refill(s), Type: Maintenance  Metoprolol Succinate  mg oral tablet, extended release: 0.5 tab(s) ( 50 mg ), po, daily, # 45 tab(s), 3 Refill(s), Type: Maintenance, Pharmacy: [facility], 0.5 tab(s) po daily  Vitamin D3 1000 intl units oral capsule: 3 cap(s) ( 3,000 International Unit ), po, daily, # 270 cap(s), 0 Refill(s), Type: Maintenance, Pharmacy: [facility], 3 cap(s) po daily  atorvastatin 80 mg oral tablet: = 1 tab(s) ( 80 mg ), Oral, daily, # 90 tab(s), 0 Refill(s), Type: Maintenance, Pharmacy: [facility]  baclofen 10 mg oral tablet: 2 tab(s) ( 20 mg ), PO, tid, # 540 tab(s), 3  Refill(s), Type: Maintenance, Pharmacy: [id number], 2 tab(s) po tid  citalopram 20 mg oral tablet: 1 tab(s) ( 20 mg ), PO, Daily, # 90 tab(s), 1 Refill(s), Type: Maintenance, Pharmacy: [facility], Plans to stop after 6 more months, 1 tab(s) po daily  levothyroxine 125 mcg (0.125 mg) oral tablet: = 1 tab(s) ( 125 mcg ), Oral, daily, # 90 tab(s), 2 Refill(s), Type: Maintenance, Pharmacy: [facility]  losartan 50 mg oral tablet: 1.5 tab(s) ( 75 mg ), po, daily, # 45 tab(s), 1 Refill(s), Type: Maintenance, Pharmacy: [facility], 1.5 tab(s) po daily  nitroglycerin 0.4 mg sublingual tablet: 1 tab(s) ( 0.4 mg ), SL, q5min, PRN: for chest pain, # 10 tab(s), 0 Refill(s), Type: Maintenance, Pharmacy: [id number], 1 tab(s) sl q5 min,PRN:for chest pain  Documented Medications  Documented  Copaxone: ( 40 mg ), Subcutaneous, 3x/wk, 0 Refill(s), Type: Maintenance  Multiple Vitamins oral tablet: 1 tab(s), PO, Daily, 0 Refill(s)  aspirin 325 mg oral tablet: 1 tab(s) ( 325 mg ), PO, Daily, 0 Refill(s)  gabapentin 300 mg oral capsule: 1 cap(s) ( 300 mg ), PO, TID, # 90 cap(s), 0 Refill(s), Type: Maintenance   Problem list:    All Problems (Selected)  Obstructive sleep apnea syndrome, moderate / SNOMED CT 247068240 / Confirmed  H/O laryngeal cancer / SNOMED CT 5003360818 / Confirmed  Raynauds syndrome / SNOMED CT 037507499 / Confirmed  Hypothyroid / SNOMED CT 673128120 / Confirmed  MS (multiple sclerosis) / SNOMED CT 06327209 / Confirmed  Hyperlipidemia / SNOMED CT 24395701 / Confirmed  Hyperlipidemia / SNOMED CT 64278312 / Confirmed      Histories   Past Medical History:    Active  Hyperlipidemia (64148079)  MS (multiple sclerosis) (38270242)  Comments:  8/8/2012 CDT 9:33 AM CDT - Linnea Regan  MS with dystonic seizures diagnosed in 1991.  Following an episode of shingles, he had severe exacerbation of MS that lead to a 2-week  hospitalization in .  [facility] last flare-up was during a trip to [facility] in 2003.    2012 CDT 9:52 AM Linnea Tracy  The MS is followed by Dr. Noriega.  Hyperlipidemia (69178364)  Resolved  *Hospitalized@[facility] - Pneumonia, probable aspiration: Onset on 2011 at 61 years.  Resolved.  Bicipital tendinitis of left shoulder (911622569): Onset in the month of 10/2005 at 55 years  Resolved.  Comments:  2012 CDT 9:26 AM Linnea Tracy  Left shoulder surgery in .  Hosp for sever flare of MS: Onset in the month of 1999 at 49 years  Resolved.  Inguinal hernia ([id number]):  Resolved on 2010 at 60 years.  Fx left ankle:  Resolved.  Comments:  2012 CDT 9:16 AM Linnea Tracy  Distal fibular fracture of the right ankle, healed.   Family History:    MI  Father ()  Heart disease  Sister (Salud, )  Comments:  2011 1:11 PM HAZELT - Neto Drummond MD  CABG  Mother ()  Comments:  2012 9:48 AM Linnea Tracy  CABG performed.  Sister (Estela)  CA - Cancer of ovary  Sister (Salud, )  Gallbladder disease  Mother ()  CABG - Coronary artery bypass graft  Mother ()  Angioplasty  Sister (Salud, ): onset in  at 51 years.  Sister (Estela): onset in  at 54 years.  Gallbladder  Mother ()  ASHD - Atherosclerotic heart disease  Mother ()  Sister (Estela)        Physical Examination   Vital Signs   2018 8:59 AM CST Temperature Tympanic 96 DegF  LOW    Peripheral Pulse Rate 60 bpm    Pulse Site Radial artery    Respiratory Rate 16 br/min    Systolic Blood Pressure 132 mmHg  HI    Diastolic Blood Pressure 82 mmHg  HI    Mean Arterial Pressure 99 mmHg    BP Site Right arm      Measurements from flowsheet : Measurements   2018 8:59 AM CST Height Measured - Standard 68 in    Weight Measured - Standard 152 lb    BSA 1.82 m2    Body Mass Index 23.11 kg/m2      General:  No acute distress.    Musculoskeletal:  good  strength with heel and toe raise.  Patella and achilles DTRs symmetrical.  Good strength with resisted dorsal and plantar flexion. Good strength with resisted flexion and extension of lower legs. Straight leg raise does not elicit pain up to 75 degrees bilaterally..    Psychiatric:  Appropriate mood & affect.       Impression and Plan   Diagnosis     Low back pain (DMK33-EZ M54.5).     Summary:  will treat with flexeril and medrol dosepak, ice/heat, ibuprofen, lidocaine patches, PT.    Orders     Orders   Pharmacy:  Medrol Dosepak 4 mg oral tablet (Prescribe): = 1 packet(s), PO, Once, Instructions: as directed on package labeling, # 21 tab(s), 0 Refill(s), Type: Soft Stop, Pharmacy: [facility], 1 packet(s) Oral once,Instr:as directed on package labeling  cyclobenzaprine 10 mg oral tablet (Prescribe): = 1 tab(s) ( 10 mg ), PO, TID, PRN: for spasm, # 30 tab(s), 0 Refill(s), Type: Maintenance, Pharmacy: [facility], 1 tab(s) Oral tid,PRN:for spasm.     Orders   Requests (Procedures):  Physical Therapy (Request) (Order): Low back pain.     Orders   Charges (Evaluation and Management):  26239 office outpatient visit 15 minutes (Charge) (Order): Quantity: 1, Low back pain.

## 2022-02-16 NOTE — PROGRESS NOTES
Patient:   TIGRE VARELA            MRN: [id number]            FIN: [id number]               Age:   69 years     Sex:  Male     :  1950   Associated Diagnoses:   Depression; H/O laryngeal cancer; Hypothyroid; MS (multiple sclerosis); Physical exam   Author:   Eloy Cortez MD      Visit Information      Date of Service: 2019 09:36 am  Performing Location: [facility]  Encounter#: [id number]      Primary Care Provider (PCP):  Eloy Cortez MD    NPI# [id number]      Referring Provider:  Eloy Cortez MD, NPI# [id number]      Chief Complaint   2019 9:40 AM CDT    Med check. Pt would like to start depression medication.              Additional Information:No additional information recorded during visit.   Chief complaint and symptoms as noted above and confirmed with patient.  Recent lab and diagnostic studies reviewed with patient      History of Present Illness   2019: Jaren presents for regular follow-up.  Scheduled to see neurologist in the near future.  Recently established with a new neurologist that is considering taking him off of Copaxone therapy if his MRI imaging remains stable.  He is apprehensive about changes though understands the reasoning.  Describes feeling more rundown with less motivation since coming off of citalopram 2 years ago.  Wonders if he may do better with reintroducing citalopram.  Did see ears nose and throat provider and [place] due to some swallowing concerns this past December.  Direct laryngoscopy performed at that time and no concerns brought up.         Review of Systems   Constitutional:  No fever, No chills.    Eye:  Negative except as documented in history of present illness.    Ear/Nose/Mouth/Throat:  Negative except as documented in history of present illness.    Respiratory:  No shortness of breath.    Cardiovascular:  Chest pain, No palpitations, No peripheral edema, No syncope.    Gastrointestinal:  No nausea, No vomiting, No abdominal pain.     Genitourinary:  No dysuria, No hematuria.    Hematology/Lymphatics:  Negative except as documented in history of present illness.    Endocrine:  No excessive thirst, No polyuria.    Immunologic:  No recurrent fevers.    Musculoskeletal:  No joint pain, No muscle pain.    Neurologic:  Alert and oriented X4, Numbness, No tingling, No headache.    Psychiatric:  Depression.       Health Status   Allergies:    Allergic Reactions (Selected)  Severity Not Documented  Codeine (Hives)   Medications:  (Selected)   Prescriptions  Prescribed  CPAP +6 cm H2o: CPAP +6 cm H2o, See Instructions, Instructions: Heated humidifier, heated tubing, filters, nasal or full face mask of choice with headgear.  Replacement cushions and supplies as needed.  Months = 99 (Lifetime), Supply, # 1 EA, 0 Refill(s), Type: Maint...  Jobst Facioplasty Garment: Jobst Facioplasty Garment, See Instructions, Instructions: use as directed, Supply, # 1 EA, 0 Refill(s), Type: Maintenance  Metoprolol Succinate  mg oral tablet, extended release: 0.5 tab(s) ( 50 mg ), po, daily, # 45 tab(s), 3 Refill(s), Type: Maintenance, Pharmacy: [facility], 0.5 tab(s) po daily  Metoprolol Succinate  mg oral tablet, extended release: See Instructions, Instructions: TAKE ONE-HALF TABLET BY MOUTH EVERY DAY, # 45 unknown unit, 3 Refill(s), Type: Soft Stop, Pharmacy: [facility], TAKE ONE-HALF TABLET BY MOUTH EVERY DAY  Vitamin D3 1000 intl units oral capsule: 3 cap(s) ( 3,000 International Unit ), po, daily, # 270 cap(s), 0 Refill(s), Type: Maintenance, Pharmacy: [facility], 3 cap(s) po daily  atorvastatin 80 mg oral tablet: = 1 tab(s), Oral, daily, # 90 tab(s), 1 Refill(s), Type: Maintenance, Pharmacy: [facility]  baclofen 10 mg oral tablet: 2 tab(s) ( 20 mg ), PO, tid, # 540 tab(s), 3 Refill(s), Type: Maintenance, Pharmacy: [id number], 2 tab(s)  po tid  citalopram 20 mg oral tablet: = 1 tab(s) ( 20 mg ), Oral, daily, # 90 tab(s), 3 Refill(s), Type: Maintenance, Pharmacy: [facility], 1 tab(s) Oral daily  cyclobenzaprine 10 mg oral tablet: = 1 tab(s) ( 10 mg ), PO, TID, PRN: for spasm, # 30 tab(s), 0 Refill(s), Type: Maintenance, Pharmacy: [facility], 1 tab(s) Oral tid,PRN:for spasm  levothyroxine 125 mcg (0.125 mg) oral tablet: = 1 tab(s), Oral, daily, # 30 tab(s), 0 Refill(s), Type: Maintenance, Pharmacy: [facility], patient due for annual  losartan 50 mg oral tablet: 1.5 tab(s) ( 75 mg ), po, daily, # 45 tab(s), 1 Refill(s), Type: Maintenance, Pharmacy: [facility], 1.5 tab(s) po daily  nitroglycerin 0.4 mg sublingual tablet: 1 tab(s) ( 0.4 mg ), SL, q5min, PRN: for chest pain, # 10 tab(s), 0 Refill(s), Type: Maintenance, Pharmacy: [id number], 1 tab(s) sl q5 min,PRN:for chest pain  Documented Medications  Documented  Copaxone: ( 40 mg ), Subcutaneous, 3x/wk, 0 Refill(s), Type: Maintenance  Multiple Vitamins oral tablet: 1 tab(s), PO, Daily, 0 Refill(s)  aspirin 325 mg oral tablet: 1 tab(s) ( 325 mg ), PO, Daily, 0 Refill(s)  gabapentin 300 mg oral capsule: 1 cap(s) ( 300 mg ), PO, TID, # 90 cap(s), 0 Refill(s), Type: Maintenance,    Medications          *denotes recorded medication          Jobst Facioplasty Garment: See Instructions, use as directed, 1 EA.          CPAP +6 cm H2o: See Instructions, Heated humidifier, heated tubing, filters, nasal or full face mask of choice with headgear.  Replacement cushions and supplies as needed.  Months = 99 (Lifetime), 1 EA, 0 Refill(s).          *aspirin 325 mg oral tablet: 325 mg, 1 tab(s), PO, Daily.          atorvastatin 80 mg oral tablet: 1 tab(s), Oral, daily, 90 tab(s), 1 Refill(s).          baclofen 10 mg oral tablet: 20 mg, 2 tab(s), PO, tid, 540 tab(s), 3 Refill(s).          Vitamin D3 1000  intl units oral capsule: 3,000 International Unit, 3 cap(s), po, daily, 270 cap(s), 0 Refill(s).          citalopram 20 mg oral tablet: 20 mg, 1 tab(s), Oral, daily, 90 tab(s), 3 Refill(s).          cyclobenzaprine 10 mg oral tablet: 10 mg, 1 tab(s), PO, TID, PRN: for spasm, 30 tab(s), 0 Refill(s).          *gabapentin 300 mg oral capsule: 300 mg, 1 cap(s), PO, TID, 90 cap(s), 0 Refill(s).          *Copaxone: 40 mg, Subcutaneous, 3x/wk, 0 Refill(s).          levothyroxine 125 mcg (0.125 mg) oral tablet: 1 tab(s), Oral, daily, 30 tab(s), 0 Refill(s).          losartan 50 mg oral tablet: 75 mg, 1.5 tab(s), po, daily, 45 tab(s), 1 Refill(s).          Metoprolol Succinate  mg oral tablet, extended release: 50 mg, 0.5 tab(s), po, daily, 45 tab(s), 3 Refill(s).          Metoprolol Succinate  mg oral tablet, extended release: See Instructions, TAKE ONE-HALF TABLET BY MOUTH EVERY DAY, 45 unknown unit, 3 Refill(s).          *Multiple Vitamins oral tablet: 1 tab(s), PO, Daily.          nitroglycerin 0.4 mg sublingual tablet: 0.4 mg, 1 tab(s), SL, q5min, 10 tab(s), PRN: for chest pain.       Problem list:    All Problems  Hypothyroid / SNOMED CT 651534974 / Confirmed  H/O laryngeal cancer / SNOMED CT 2727794430 / Confirmed  Hyperlipidemia / SNOMED CT 42102782 / Confirmed  Hyperlipidemia / SNOMED CT 63421431 / Confirmed  MS (multiple sclerosis) / SNOMED CT 48892710 / Confirmed  Obstructive sleep apnea syndrome, moderate / SNOMED CT 991655392 / Confirmed  Raynauds syndrome / SNOMED CT 307998243 / Confirmed  Inactive: Seizures / SNOMED CT 773983141  Resolved: *Hospitalized@[facility] - Pneumonia, probable aspiration  Resolved: Bicipital tendinitis of left shoulder / SNOMED CT 073584135  Resolved: Fx left ankle  Resolved: Hosp for sever flare of MS  Resolved: Inguinal hernia / SNOMED CT 5731087630  Canceled: FHx: Heart Disease / ICD-9-CM V17.49  Canceled: Cancer of base of tongue / SNOMED CT 964032261  Canceled: Pneumonia  NOS / ICD-9-      Histories   Past Medical History:    Active  Hyperlipidemia (21180747)  MS (multiple sclerosis) (94849181)  Comments:  2012 CDT 9:33 AM Linnea Tracy  MS with dystonic seizures diagnosed in .  Following an episode of shingles, he had severe exacerbation of MS that lead to a 2-week hospitalization in .  Radha last flare-up was during a trip to [facility] in 2003.    2012 CDT 9:52 AM Linnea Tracy  The MS is followed by Dr. Noriega.  Hyperlipidemia (69203008)  Resolved  *Hospitalized@[facility] - Pneumonia, probable aspiration: Onset on 2011 at 61 years.  Resolved.  Bicipital tendinitis of left shoulder (404362905): Onset in the month of 10/2005 at 55 years  Resolved.  Comments:  2012 CDT 9:26 AM Linnea Tracy  Left shoulder surgery in .  Hosp for sever flare of MS: Onset in the month of 1999 at 49 years  Resolved.  Inguinal hernia (1391810449):  Resolved on 2010 at 60 years.  Fx left ankle:  Resolved.  Comments:  2012 CDT 9:16 AM Linnea Tracy  Distal fibular fracture of the right ankle, healed.   Family History:    MI  Father ()  Heart disease  Sister (Salud, )  Comments:  2011 1:11 PM CDT - Neto Drummond MD  CABG  Mother ()  Comments:  2012 9:48 AM Linnea Tracy  CABG performed.  Sister (Estela)  CA - Cancer of ovary  Sister (Salud, )  Gallbladder disease  Mother ()  CABG - Coronary artery bypass graft  Mother ()  Angioplasty  Sister (Salud, ): onset in  at 51 years.  Sister (Estela): onset in  at 54 years.  Gallbladder  Mother ()  ASHD - Atherosclerotic heart disease  Mother ()  Sister (Estela)     Procedure history:    Polysomnogram (734362351) on 2018 at 68 Years.  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  Cardiac stress test stage  (6933409406) on 1/11/2010 at 59 Years.  Comments:  8/7/2012 2:00 PM Linnea Tracy  Treadmill echocardiogram is negative for inducible myocardial ischemia.  Normal resting regional and global left ventricular systolic performance with a visually-estimated ejection fraction of 55% to 65%.    8/7/2012 1:56 PM Linnea Tracy  Summary:  No evidence of cardiac ischemia on electrocardiographic stress testing.  Colonoscopy (527051131) on 3/25/2009 at 58 Years.  Comments:  8/29/2014 8:32 AM CDT - Linnea Lucas CMA  negative-no polyps/masses  Colonoscopy (748442307) in the month of 2/2008 at 57 Years.  Comments:  7/8/2010 7:58 AM CDT - BERENICE DIAS  Cardiac stress test stage (1556833310) on 8/14/2007 at 57 Years.  Comments:  8/8/2012 9:22 AM Linnea Tracy  The treadmill portion of this Cardiolite stress test was  normal.  Laparoscopic repair of inguinal hernia (20385011) on 3/31/2005 at 54 Years.  Comments:  7/6/2011 12:25 PM HAZELT - Linnea Lucas CMA  Left  Flexible sigmoidoscopy (89696231) on 2/19/2004 at 53 Years.  Left shoulder surgery in 1990 at 40 Years.  Left knee surgery in 1970 at 20 Years.  T & A.   Social History:        Alcohol Assessment            Current, Beer (12 oz), 6 times per week, 1 drinks/episode average.      Tobacco Assessment            Past, 5 year(s).                     Comments:                      08/08/2012 - Linnea Lucas CMA                     Quit at age 26yo      Substance Abuse Assessment            Past, Marijuana      Employment and Education Assessment            Employed, Work/School description: .      Home and Environment Assessment            Marital status: .  Spouse/Partner name: Sara Mancera.      Exercise and Physical Activity Assessment            Exercise frequency: 6-7 x/week.  Exercise type: Weight lifting, treadmill.      Sexual Assessment            Sexually active: Yes.  Sexual orientation: Heterosexual.        Physical  Examination   vital signs stable, as noted above   Vital Signs   8/27/2019 9:40 AM CDT Temperature Tympanic 96.2 DegF  LOW    Peripheral Pulse Rate 62 bpm    Systolic Blood Pressure 122 mmHg    Diastolic Blood Pressure 78 mmHg    Mean Arterial Pressure 93 mmHg      Measurements from flowsheet : Measurements   8/27/2019 9:40 AM CDT Height Measured - Standard 68 in    Weight Measured - Standard 152 lb    BSA 1.82 m2    Body Mass Index 23.11 kg/m2      General:  Alert and oriented, No acute distress.    Eye:  Extraocular movements are intact.    HENT:  Normocephalic, Oral mucosa is moist.    Neck:  Supple, Non-tender, No carotid bruit, No jugular venous distention, No lymphadenopathy, No thyromegaly, post-dissection incisional scar.    Respiratory:  Lungs are clear to auscultation, Respirations are non-labored.    Cardiovascular:  Normal rate, Regular rhythm, No murmur, No edema.    Gastrointestinal:  Soft, Non-tender, Non-distended, Normal bowel sounds.    Musculoskeletal:  Normal range of motion, Normal strength, No tenderness.    Neurologic:  Alert, Oriented, Normal motor function, No focal deficits.    Cognition and Speech:  Oriented, Speech clear and coherent.    Psychiatric:  Appropriate mood & affect.       Review / Management   Results review:  Lab results   8/20/2019 9:02 AM CDT Sodium Level 141 mmol/L    Potassium Level 4.6 mmol/L    Chloride Level 104 mmol/L    CO2 Level 31 mmol/L    Glucose Level 86 mg/dL    BUN 21 mg/dL    Creatinine 1.31 mg/dL  HI    BUN/Creat Ratio 16    eGFR 56 mL/min/1.73m2  LOW    eGFR African American 65 mL/min/1.73m2    Calcium Level 9.4 mg/dL    Cholesterol 147 mg/dL    Non-    HDL 41 mg/dL    Chol/HDL Ratio 3.6    LDL 83    Triglyceride 133 mg/dL    TSH 0.61 mIU/L    PSA 1.5 ng/mL   .       Impression and Plan   Diagnosis     Depression (TKM54-PU F32.9).     H/O laryngeal cancer (QIX05-SV Z85.21).     Hypothyroid (YVD19-ID E03.4).     MS (multiple sclerosis) (SUP26-AL G35).      Physical exam (TFG87-MX Z00.00).       .) Raynauds phenomena   - general reassurance   - recommending hand warmers/gloves - could consider CCB in the future if developing more pains    .) chronic fatigue, some endorsed sleep apneic features (witnessed pauses, heavy snoring)   - h/o laryngeal resection   - sleep study ('18) demonstrating moderate sleep apnea - opting not to pursue CPAP at this time    .) multiple sclerosis, relapsing/remitting - follows with neurology   - on glatiramer    - takes baclofen for spasticity - well controlled    .) laryngeal Ca - resected in '11 - follows through [facility] remotely   - HPV related   - no further surveillance imaging    .) HLPD   - strong family h/o CAD   - on ASA 325mg daily, atorvastatin 80mg qhs   - follows with Dr. Rubén Drummond (cardio)   - normal stress echo, 2019    .) depression - felt better on citalopram (weaned off in '17)   - will resume citalopram and monitor symptoms    .) health maintenance   - skin surveillance exam   - CRC due now; arrange for screening colonoscopy; ASA2   - continue annual PSA testing   - hypothyroid; TSH wnl   - completed Shingrix    RTC annually

## 2022-02-16 NOTE — TELEPHONE ENCOUNTER
---------------------  From: Radha Gaxiola RN (Phone Messages Pool ([id number]_[facility]))   Sent: 9/29/2021 10:53:39 AM CDT  Subject: General Message- Moderna booster       PCP:  SAMARIA      Time of Call:  10:46AM       Person Calling:  pt  Phone number:      Note:  Pt called in, inquiring if [facility] is giving Moderna booster shots. Pt stated he has AWV scheduled with [facility] on Oct 8 and usually receives his flu shot.  Discussed booster shot guidelines. Pt decided he will discuss if he is eligible for the booster at AWV.    Last office visit and reason: 9/9/20 AWV SAMARIA

## 2022-02-16 NOTE — PROGRESS NOTES
Patient:   TIGRE VARELA            MRN: [id number]            FIN: [id number]               Age:   69 years     Sex:  Male     :  1950   Associated Diagnoses:   Bradycardia   Author:   Diego KHAN, Eloy      Procedure   EKG procedure   Indication: bradycardia.     Position: supine.     EKG findings   Interpretation: by primary care provider.     Rhythm: heart rate  47  beats/min, sinus normal.     Axis: normal axis, normal configuration.     Within normal limits.     Intervals: NM normal, QRS normal, QT normal.     Normal EKG.     P waves: normal.     QRS complex: normal, no Q waves present.     ST-T-U complex: normal.     Interpretation: no ST-T wave abnormalities.     Discussed: with patient.        Impression and Plan   Diagnosis     Bradycardia (HPH16-SV R00.1).     Orders   sinus bradycardia

## 2022-02-16 NOTE — PROGRESS NOTES
Patient:   TIGRE VARELA            MRN: [id number]            FIN: [id number]               Age:   69 years     Sex:  Male     :  1950   Associated Diagnoses:   Depression; H/O laryngeal cancer; Hypothyroid; MS (multiple sclerosis)   Author:   Eloy Cortez MD      Visit Information      Date of Service: 10/23/2019 10:49 am  Performing Location: [facility]  Encounter#: [id number]      Primary Care Provider (PCP):  Eloy Cortez MD    NPI# [id number]      Referring Provider:  Eloy Cortez MD, NPI# [id number]      Chief Complaint   10/23/2019 10:54 AM CDT  preop - scheduled for colonoscopy 10/30 with JDL at [facility]              Additional Information:No additional information recorded during visit.   Chief complaint and symptoms as noted above and confirmed with patient.  Recent lab and diagnostic studies reviewed with patient      History of Present Illness   2019: Jaren presents for regular follow-up.  Scheduled to see neurologist in the near future.  Recently established with a new neurologist that is considering taking him off of Copaxone therapy if his MRI imaging remains stable.  He is apprehensive about changes though understands the reasoning.  Describes feeling more rundown with less motivation since coming off of citalopram 2 years ago.  Wonders if he may do better with reintroducing citalopram.  Did see ears nose and throat provider and [place] due to some swallowing concerns this past December.  Direct laryngoscopy performed at that time and no concerns brought up.    10/23/2019:  Presents for pre-op assessment for planned screening colonoscopy; ASA3 - diagnosed with ELODIA this past year though does not use CPAP.  Feeling well.          Review of Systems   Constitutional:  No fever, No chills.    Eye:  Negative except as documented in history of present illness.    Ear/Nose/Mouth/Throat:  Negative except as documented in history of present illness.    Respiratory:  No shortness of breath.     Cardiovascular:  No chest pain, No palpitations, No peripheral edema, No syncope.    Gastrointestinal:  No nausea, No vomiting, No abdominal pain.    Genitourinary:  No dysuria, No hematuria.    Hematology/Lymphatics:  Negative except as documented in history of present illness.    Endocrine:  No excessive thirst, No polyuria.    Immunologic:  No recurrent fevers.    Musculoskeletal:  No joint pain, No muscle pain.    Neurologic:  Alert and oriented X4, Numbness, No tingling, No headache.    Psychiatric:  Depression.       Health Status   Allergies:    Allergic Reactions (Selected)  Severity Not Documented  Codeine (Hives)   Medications:  (Selected)   Prescriptions  Prescribed  CPAP +6 cm H2o: CPAP +6 cm H2o, See Instructions, Instructions: Heated humidifier, heated tubing, filters, nasal or full face mask of choice with headgear.  Replacement cushions and supplies as needed.  Months = 99 (Lifetime), Supply, # 1 EA, 0 Refill(s), Type: Maint...  Jobst Facioplasty Garment: Jobst Facioplasty Garment, See Instructions, Instructions: use as directed, Supply, # 1 EA, 0 Refill(s), Type: Maintenance  Metoprolol Succinate  mg oral tablet, extended release: 0.5 tab(s) ( 50 mg ), po, daily, # 45 tab(s), 3 Refill(s), Type: Maintenance, Pharmacy: [facility], 0.5 tab(s) po daily  Metoprolol Succinate ER 25 mg oral tablet, extended release: = 1 tab(s) ( 25 mg ), Oral, daily, # 90 tab(s), 3 Refill(s), Type: Maintenance, Pharmacy: [facility], 1 tab(s) Oral daily  Vitamin D3 1000 intl units oral capsule: 3 cap(s) ( 3,000 International Unit ), po, daily, # 270 cap(s), 0 Refill(s), Type: Maintenance, Pharmacy: [facility], 3 cap(s) po daily  atorvastatin 80 mg oral tablet: = 1 tab(s), Oral, daily, # 90 tab(s), 3 Refill(s), Type: Maintenance, Pharmacy: [facility], 1 tab(s) Oral daily  baclofen 10 mg oral tablet: 2 tab(s) ( 20 mg ),  PO, tid, # 540 tab(s), 3 Refill(s), Type: Maintenance, Pharmacy: [id number], 2 tab(s) po tid  citalopram 20 mg oral tablet: = 1 tab(s) ( 20 mg ), Oral, daily, # 90 tab(s), 3 Refill(s), Type: Maintenance, Pharmacy: [facility], 1 tab(s) Oral daily  levothyroxine 125 mcg (0.125 mg) oral tablet: = 1 tab(s), Oral, daily, # 90 tab(s), 3 Refill(s), Type: Maintenance, Pharmacy: [facility], 1 tab(s) Oral daily  losartan 50 mg oral tablet: = 1.5 tab(s) ( 75 mg ), po, daily, # 135 tab(s), 3 Refill(s), Type: Maintenance, Pharmacy: [facility], keep on file, 1.5 tab(s) Oral daily  nitroglycerin 0.4 mg sublingual tablet: 1 tab(s) ( 0.4 mg ), SL, q5min, PRN: for chest pain, # 10 tab(s), 0 Refill(s), Type: Maintenance, Pharmacy: [id number], 1 tab(s) sl q5 min,PRN:for chest pain  Documented Medications  Documented  Copaxone: ( 40 mg ), Subcutaneous, 3x/wk, 0 Refill(s), Type: Maintenance  Multiple Vitamins oral tablet: 1 tab(s), PO, Daily, 0 Refill(s)  aspirin 325 mg oral tablet: 1 tab(s) ( 325 mg ), PO, Daily, 0 Refill(s)  gabapentin 300 mg oral capsule: 1 cap(s) ( 300 mg ), PO, TID, # 90 cap(s), 0 Refill(s), Type: Maintenance,    Medications          *denotes recorded medication          Jobst Facioplasty Garment: See Instructions, use as directed, 1 EA.          CPAP +6 cm H2o: See Instructions, Heated humidifier, heated tubing, filters, nasal or full face mask of choice with headgear.  Replacement cushions and supplies as needed.  Months = 99 (Lifetime), 1 EA, 0 Refill(s).          *aspirin 325 mg oral tablet: 325 mg, 1 tab(s), PO, Daily.          atorvastatin 80 mg oral tablet: 1 tab(s), Oral, daily, 90 tab(s), 3 Refill(s).          baclofen 10 mg oral tablet: 20 mg, 2 tab(s), PO, tid, 540 tab(s), 3 Refill(s).          Vitamin D3 1000 intl units oral capsule: 3,000 International Unit, 3 cap(s), po, daily, 270 cap(s),  0 Refill(s).          citalopram 20 mg oral tablet: 20 mg, 1 tab(s), Oral, daily, 90 tab(s), 3 Refill(s).          *gabapentin 300 mg oral capsule: 300 mg, 1 cap(s), PO, TID, 90 cap(s), 0 Refill(s).          *Copaxone: 40 mg, Subcutaneous, 3x/wk, 0 Refill(s).          levothyroxine 125 mcg (0.125 mg) oral tablet: 1 tab(s), Oral, daily, 90 tab(s), 3 Refill(s).          losartan 50 mg oral tablet: 75 mg, 1.5 tab(s), po, daily, 135 tab(s), 3 Refill(s).          Metoprolol Succinate  mg oral tablet, extended release: 50 mg, 0.5 tab(s), po, daily, 45 tab(s), 3 Refill(s).          Metoprolol Succinate ER 25 mg oral tablet, extended release: 25 mg, 1 tab(s), Oral, daily, 90 tab(s), 3 Refill(s).          *Multiple Vitamins oral tablet: 1 tab(s), PO, Daily.          nitroglycerin 0.4 mg sublingual tablet: 0.4 mg, 1 tab(s), SL, q5min, 10 tab(s), PRN: for chest pain.       Problem list:    All Problems  Hypothyroid / SNOMED CT 588287825 / Confirmed  H/O laryngeal cancer / SNOMED CT 7774266838 / Confirmed  Hyperlipidemia / SNOMED CT 73713413 / Confirmed  Hyperlipidemia / SNOMED CT 15368931 / Confirmed  MS (multiple sclerosis) / SNOMED CT 58225815 / Confirmed  Obstructive sleep apnea syndrome, moderate / SNOMED CT 673381570 / Confirmed  Raynauds syndrome / SNOMED CT 767416516 / Confirmed  Inactive: Seizures / SNOMED CT 802769389  Resolved: *[email or web address] - Pneumonia, probable aspiration  Resolved: Bicipital tendinitis of left shoulder / SNOMED CT 008866689  Resolved: Fx left ankle  Resolved: Hosp for sever flare of MS  Resolved: Inguinal hernia / SNOMED CT 9069793685  Canceled: FHx: Heart Disease / ICD-9-CM V17.49  Canceled: Cancer of base of tongue / SNOMED CT 887671633  Canceled: Pneumonia NOS / ICD-9-      Histories   Past Medical History:    Active  Hyperlipidemia (85031077)  MS (multiple sclerosis) (83894490)  Comments:  8/8/2012 CDT 9:33 AM CDT - Linnea Regan  MS with dystonic seizures diagnosed in 1991.   Following an episode of shingles, he had severe exacerbation of MS that lead to a 2-week hospitalization in .  [facility] last flare-up was during a trip to [facility] in 2003.    2012 CDT 9:52 AM Linnea Tracy  The MS is followed by Dr. Noriega.  Hyperlipidemia (04335099)  Resolved  *[email or web address] - Pneumonia, probable aspiration: Onset on 2011 at 61 years.  Resolved.  Bicipital tendinitis of left shoulder (214208716): Onset in the month of 10/2005 at 55 years  Resolved.  Comments:  2012 CDT 9:26 AM Linnea Tracy  Left shoulder surgery in .  Hosp for sever flare of MS: Onset in the month of 1999 at 49 years  Resolved.  Inguinal hernia (9765856468):  Resolved on 2010 at 60 years.  Fx left ankle:  Resolved.  Comments:  2012 CDT 9:16 AM Linnea Tracy  Distal fibular fracture of the right ankle, healed.   Family History:    MI  Father ()  Heart disease  Sister (Salud, )  Comments:  2011 1:11 PM HAZELT - Neto Drummond MD  CABG  Mother ()  Comments:  2012 9:48 AM Linnea Tracy  CABG performed.  Sister (Estela)  CA - Cancer of ovary  Sister (Salud, )  Gallbladder disease  Mother ()  CABG - Coronary artery bypass graft  Mother ()  Angioplasty  Sister (Salud, ): onset in  at 51 years.  Sister (Estela): onset in  at 54 years.  Gallbladder  Mother ()  ASHD - Atherosclerotic heart disease  Mother ()  Sister (Estela)     Procedure history:    Polysomnogram (974606585) on 2018 at 68 Years.  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  Cardiac stress test stage (4979377176) on 2010 at 59 Years.  Comments:  2012 2:00 PM Linnea Tracy  Treadmill echocardiogram is negative for inducible myocardial ischemia.  Normal resting regional and global left ventricular systolic performance  with a visually-estimated ejection fraction of 55% to 65%.    8/7/2012 1:56 PM CDT - Linnea Regan  Summary:  No evidence of cardiac ischemia on electrocardiographic stress testing.  Colonoscopy (770030777) on 3/25/2009 at 58 Years.  Comments:  8/29/2014 8:32 AM CDT - Linnea Lucas CMA  negative-no polyps/masses  Colonoscopy (091536495) in the month of 2/2008 at 57 Years.  Comments:  7/8/2010 7:58 AM CDT - BERENICE DIAS  Cardiac stress test stage (6385049514) on 8/14/2007 at 57 Years.  Comments:  8/8/2012 9:22 AM CDT - Linnea Regan  The treadmill portion of this Cardiolite stress test was  normal.  Laparoscopic repair of inguinal hernia (70626542) on 3/31/2005 at 54 Years.  Comments:  7/6/2011 12:25 PM CDT - Linnea Lucas CMA  Left  Flexible sigmoidoscopy ([id number]) on 2/19/2004 at 53 Years.  Left shoulder surgery in 1990 at 40 Years.  Left knee surgery in 1970 at 20 Years.  T & A.   Social History:        Alcohol Assessment            Current, Beer (12 oz), 6 times per week, 1 drinks/episode average.  2 drinks/episode maximum.      Tobacco Assessment            Past, 5 year(s).                     Comments:                      08/08/2012 - Linnea Lucas CMA                     Quit at age 28yo      Substance Abuse Assessment            Past, Marijuana      Employment and Education Assessment            Employed, Work/School description: .      Home and Environment Assessment            Marital status: .  Spouse/Partner name: Sara Mancera.      Exercise and Physical Activity Assessment            Exercise frequency: 6-7 x/week.  Exercise type: Weight lifting, treadmill.      Sexual Assessment            Sexually active: Yes.  Sexual orientation: Heterosexual.        Physical Examination   vital signs stable, as noted above   Vital Signs   10/23/2019 11:19 AM CDT Systolic Blood Pressure 144 mmHg  HI     Diastolic Blood Pressure 79 mmHg     Mean Arterial Pressure 101 mmHg    10/23/2019  10:54 AM CDT Temperature Tympanic 97 DegF  LOW     Peripheral Pulse Rate 43 bpm  LOW (Modified)    Systolic Blood Pressure 152 mmHg  HI     Diastolic Blood Pressure 70 mmHg     Mean Arterial Pressure 97 mmHg       Measurements from flowsheet : Measurements   10/23/2019 11:19 AM CDT Height Measured - Standard 68 in   10/23/2019 10:54 AM CDT Height Measured - Standard 68 in      General:  Alert and oriented, No acute distress.    Eye:  Extraocular movements are intact.    HENT:  Normocephalic.    Neck:  Supple, Non-tender, post-dissection incisional scar.    Respiratory:  Lungs are clear to auscultation, Respirations are non-labored.    Cardiovascular:  Normal rate, Regular rhythm, No murmur, No edema.    Gastrointestinal:  Soft, Non-tender, Non-distended.    Neurologic:  Alert, Oriented.    Cognition and Speech:  Oriented, Speech clear and coherent.    Psychiatric:  Appropriate mood & affect.       Review / Management   Results review:  Lab results   8/20/2019 9:02 AM CDT Sodium Level 141 mmol/L    Potassium Level 4.6 mmol/L    Chloride Level 104 mmol/L    CO2 Level 31 mmol/L    Glucose Level 86 mg/dL    BUN 21 mg/dL    Creatinine 1.31 mg/dL  HI    BUN/Creat Ratio 16    eGFR 56 mL/min/1.73m2  LOW    eGFR African American 65 mL/min/1.73m2    Calcium Level 9.4 mg/dL    Cholesterol 147 mg/dL    Non-    HDL 41 mg/dL    Chol/HDL Ratio 3.6    LDL 83    Triglyceride 133 mg/dL    TSH 0.61 mIU/L    PSA 1.5 ng/mL   .       Impression and Plan   Diagnosis     Depression (VDT29-CM F32.9).     H/O laryngeal cancer (CSA79-NT Z85.21).     Hypothyroid (GZA72-DH E03.4).     MS (multiple sclerosis) (SNH74-IP G35).         .) pre-op, planned screening colonoscopy; ASA3 (ELODIA)  - last colonoscopy in '09 (no polyps)  - HR in mid 40s today - asymptomatic; sinus bradycardia on ECG   - will have him reduce his Toprol XL from 50mg to 25mg daily   - advised to hold ASA until after procedure   - no other necessary medication  changes  plan as previously outlined:     plan as previously outlined:     .) Raynauds phenomena   - general reassurance   - recommending hand warmers/gloves - could consider CCB in the future if developing more pains    .) chronic fatigue, some endorsed sleep apneic features (witnessed pauses, heavy snoring)   - h/o laryngeal resection   - sleep study ('18) demonstrating moderate sleep apnea - opting not to pursue CPAP at this time    .) multiple sclerosis, relapsing/remitting - follows with neurology   - on glatiramer    - takes baclofen for spasticity - well controlled    .) laryngeal Ca - resected in '11 - follows through [facility] remotely   - HPV related   - no further surveillance imaging    .) HLPD   - strong family h/o CAD   - on ASA 325mg daily, atorvastatin 80mg qhs   - follows with Dr. Rubén Drummond (cardio)   - normal stress echo, 2019    .) depression - felt better on citalopram (weaned off in '17)   - will resume citalopram and monitor symptoms    .) health maintenance   - skin surveillance exam   - CRC due now; arrange for screening colonoscopy; ASA2   - continue annual PSA testing   - hypothyroid; TSH wnl   - completed Shingrix    RTC as previously scheduled

## 2022-02-16 NOTE — PROGRESS NOTES
Patient:   TIGRE VARELA            MRN: [id number]            FIN: [id number]               Age:   71 years     Sex:  Male     :  1950   Associated Diagnoses:   None   Author:   Eloy Cortez MD      Visit Information      Date of Service: 10/08/2021 12:28 pm  Performing Location: [facility]  Encounter#: [id number]      Primary Care Provider (PCP):  Eloy Cortez MD    NPI# [id number]      Referring Provider:  Eloy Cortez MD    NPI# [id number]   Visit type:  Annual exam.       Chief Complaint   10/8/2021 12:44 PM CDT   f/u chronic -review labs - started on amlodipine recently.  Angiogram done last month - no stenting needed.     Medicare Initial / Annual Preventative Visit     HPI:      2020: Tigre returns a follow-up for physical and other chronic conditions.  Tough summer for him in terms of his MS symptoms.  Multiple flares that he thinks directed by the warm weather.  Did have diagnostic left heart catheterization earlier this summer due to atypical chest discomfort.  No significant atherosclerotic lesions without any stenting pursued.  Otherwise doing well.  Describes a general fatigue without a real clear reason why.  Did reduce his citalopram back down to 20 mg daily.         Well Adult History     ADL's and Safety were reviewed.  None found.  Please see copy of scanned form.    I have reviewed with the patient the completed health risk assessment and health history form and we have agreed on the following plans for identified risk factors. None found.  Please see copy of scanned form.    No hearing impairment, No problems with Activities of Daily Living, Not at risk for falls and Home is safe.  Patient completed  Health Risk Assessment form and Patient Health History form were reviewed with the patient and any risks identified and plans to deal with these risks are identified in the Assessment and Plan below.  Other Health Care Providers are as currently listed in the Medical Record as  a Quick Note for eye care, other medical specialists, health agencies and pharmacy and medical suppliers          Well Adult History             The patient presents for well adult exam.  I've reviewed and agree with above chief complaint and comments   .              Review of Systems   Eye:  See Preventative Services Checklist for Vision/Glaucoma Test dates..    Ear/Nose/Mouth/Throat:  Hearing is addressed and no impairment noted..    Respiratory:  No shortness of breath.    Cardiovascular:  No chest pain.    Gastrointestinal:  No nausea, No vomiting, No diarrhea.    Genitourinary:  No dysuria.    Musculoskeletal:  No neck pain, No joint pain.    Neurologic:  Alert and oriented X4.    Psychiatric:  GDS Noted  the GDS and the CAGE assessments were reviewed and discussed with the patient.    See completed Health History for Review of Systems.      Health Status   Allergies:    Allergic Reactions (Selected)  Severity Not Documented  Codeine (Hives)   Medications:  (Selected)   Prescriptions  Prescribed  CPAP +6 cm H2o: CPAP +6 cm H2o, See Instructions, Instructions: Heated humidifier, heated tubing, filters, nasal or full face mask of choice with headgear.  Replacement cushions and supplies as needed.  Months = 99 (Lifetime), Supply, # 1 EA, 0 Refill(s), Type: Maint...  Jobst Facioplasty Garment: Jobst Facioplasty Garment, See Instructions, Instructions: use as directed, Supply, # 1 EA, 0 Refill(s), Type: Maintenance  Vitamin D3 1000 intl units oral capsule: 3 cap(s) ( 3,000 International Unit ), po, daily, # 270 cap(s), 0 Refill(s), Type: Maintenance, Pharmacy: [facility], 3 cap(s) po daily  atorvastatin 80 mg oral tablet: = 1 tab(s), Oral, daily, # 90 tab(s), 3 Refill(s), Type: Maintenance, Pharmacy: [facility], 1 tab(s) Oral daily, 68, in, 09/09/20 9:25:00 CDT, Height Measured, 152, lb, 09/09/20 9:25:00 CDT, Weight Measured  baclofen 10 mg oral tablet: 2 tab(s) (  20 mg ), PO, tid, # 540 tab(s), 3 Refill(s), Type: Maintenance, [facility]: [id number], 2 tab(s) po tid  citalopram 20 mg oral tablet: = 1 tab(s) ( 20 mg ), Oral, daily, # 90 tab(s), 3 Refill(s), Type: Maintenance, Pharmacy: [facility], 1 tab(s) Oral daily, 568, in, 10/08/21 12:44:00 CDT, Height Measured, 154.7, lb, 10/08/21 12:44:00 CDT, Weight Measured  levothyroxine 112 mcg (0.112 mg) oral tablet: = 1 tab(s) ( 112 mcg ), Oral, daily, # 90 tab(s), 3 Refill(s), Type: Maintenance, Pharmacy: [facility], 1 tab(s) Oral daily, 68, in, 09/09/20 11:18:00 CDT, Height Measured, 152, lb, 09/09/20 9:25:00 CDT, Weight Measured  losartan 50 mg oral tablet: = 1.5 tab(s), Oral, daily, # 135 tab(s), 3 Refill(s), Type: Maintenance, Pharmacy: [facility], 1.5 tab(s) Oral daily, 68, in, 09/09/20 9:25:00 CDT, Height Measured, 152, lb, 09/09/20 9:25:00 CDT, Weight Measured  nitroglycerin 0.4 mg sublingual tablet: 1 tab(s) ( 0.4 mg ), SL, q5min, PRN: for chest pain, # 10 tab(s), 0 Refill(s), Type: Maintenance, Pharmacy: [id number], 1 tab(s) sl q5 min,PRN:for chest pain  sildenafil 20 mg oral tablet: See Instructions, Instructions: Take 3-5 tabs as needed for sexual activity, # 50 tab(s), 3 Refill(s), Type: Maintenance, Pharmacy: [facility], Take 3-5 tabs as needed for sexual activity, 568, in, 10/08/21 12:44:00 CDT, Heigh...  Documented Medications  Documented  Copaxone: ( 40 mg ), Subcutaneous, 3x/wk, 0 Refill(s), Type: Maintenance  Multiple Vitamins oral tablet: 1 tab(s), PO, Daily, 0 Refill(s)  amLODIPine 5 mg oral tablet: Instructions: 90 tab(s), 0 Refill(s), Type: Soft Stop  aspirin 325 mg oral tablet: 1 tab(s) ( 325 mg ), PO, Daily, 0 Refill(s)  gabapentin 300 mg oral capsule: = 2 cap(s) ( 600 mg ), PO, TID, # 90 cap(s), 0 Refill(s), Type: Maintenance,    Medications          *denotes recorded  medication          Jobst Facioplasty Garment: See Instructions, use as directed, 1 EA.          CPAP +6 cm H2o: See Instructions, Heated humidifier, heated tubing, filters, nasal or full face mask of choice with headgear.  Replacement cushions and supplies as needed.  Months = 99 (Lifetime), 1 EA, 0 Refill(s).          *amLODIPine 5 mg oral tablet: 90 tab(s), 0 Refill(s).          *aspirin 325 mg oral tablet: 325 mg, 1 tab(s), PO, Daily.          atorvastatin 80 mg oral tablet: 1 tab(s), Oral, daily, 90 tab(s), 3 Refill(s).          baclofen 10 mg oral tablet: 20 mg, 2 tab(s), PO, tid, 540 tab(s), 3 Refill(s).          Vitamin D3 1000 intl units oral capsule: 3,000 International Unit, 3 cap(s), po, daily, 270 cap(s), 0 Refill(s).          citalopram 20 mg oral tablet: 20 mg, 1 tab(s), Oral, daily, 90 tab(s), 3 Refill(s).          *gabapentin 300 mg oral capsule: 600 mg, 2 cap(s), PO, TID, 90 cap(s), 0 Refill(s).          *Copaxone: 40 mg, Subcutaneous, 3x/wk, 0 Refill(s).          levothyroxine 112 mcg (0.112 mg) oral tablet: 112 mcg, 1 tab(s), Oral, daily, 90 tab(s), 3 Refill(s).          losartan 50 mg oral tablet: 1.5 tab(s), Oral, daily, 135 tab(s), 3 Refill(s).          *Multiple Vitamins oral tablet: 1 tab(s), PO, Daily.          nitroglycerin 0.4 mg sublingual tablet: 0.4 mg, 1 tab(s), SL, q5min, 10 tab(s), PRN: for chest pain.          sildenafil 20 mg oral tablet: See Instructions, Take 3-5 tabs as needed for sexual activity, 50 tab(s), 3 Refill(s).       Problem list:    All Problems  Hypothyroid / SNOMED CT 202690684 / Confirmed  H/O laryngeal cancer / SNOMED CT 4570806258 / Confirmed  Hyperlipidemia / SNOMED CT 55570878 / Confirmed  MS (multiple sclerosis) / SNOMED CT 92029917 / Confirmed  Obstructive sleep apnea syndrome, moderate / SNOMED CT 941430541 / Confirmed  Raynauds syndrome / SNOMED CT 611393002 / Confirmed  Inactive: Seizures / SNOMED CT 318856525  Resolved: *[facility] - Pneumonia,  probable aspiration  Resolved: Bicipital tendinitis of left shoulder / SNOMED CT 954388669  Resolved: Fx left ankle  Resolved: Hosp for sever flare of MS  Resolved: Inguinal hernia / SNOMED CT 9022630839  Canceled: FHx: Heart Disease / ICD-9-CM V17.49  Canceled: Hyperlipidemia / SNOMED CT 27599979  Canceled: Cancer of base of tongue / SNOMED CT 684636279  Canceled: Pneumonia NOS / ICD-9-     Current Providers and Suppliers Noted in Demographic Area.      Histories   Past Medical History:    Active  MS (multiple sclerosis) (84744960)  Comments:  2012 CDT 9:33 AM Linnea Tracy  MS with dystonic seizures diagnosed in .  Following an episode of shingles, he had severe exacerbation of MS that lead to a 2-week hospitalization in .  Radha last flare-up was during a trip to [facility] in 2003.    2012 CDT 9:52 AM Linnea Tracy  The MS is followed by Dr. Noriega.  Hyperlipidemia (76247469)  Resolved  *Hospitalized@[email or web address] - Pneumonia, probable aspiration: Onset on 2011 at 61 years.  Resolved.  Bicipital tendinitis of left shoulder (899204779): Onset in the month of 10/2005 at 55 years  Resolved.  Comments:  2012 CDT 9:26 AM Linnea Tracy  Left shoulder surgery in .  Hosp for sever flare of MS: Onset in the month of 1999 at 49 years  Resolved.  Inguinal hernia (8017320969):  Resolved on 2010 at 60 years.  Fx left ankle:  Resolved.  Comments:  2012 CDT 9:16 AM Linnea Tracy  Distal fibular fracture of the right ankle, healed.   Family History:    MI  Father ()  Breast cancer  Aunt  Hypertension  Mother ()  Heart disease  Sister (Salud, )  Comments:  2011 1:11 PM Neto Lopez MD  CABG  Mother ()  Comments:  2012 9:48 AM Linnea Tracy  CABG performed.  Sister (Estela)  Father ()  Migraine  Aunt  CA - Cancer of ovary  Sister (Salud, )  Gallbladder disease  Mother ()  Multiple  sclerosis  Aunt  CABG - Coronary artery bypass graft  Mother ()  Angioplasty  Sister (Salud, ): onset in  at 51 years.  Sister (Estela): onset in  at 54 years.  ASHD - Atherosclerotic heart disease  Sister (Estela)     Procedure history:    Colonoscopy ([id number]) on 10/30/2019 at 69 Years.  Comments:  2020 7:20 AM CDT - Linnea Lucas CMA  normal colonoscopy - repeat in 10 years  Polysomnogram (191941274) on 2018 at 68 Years.  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  Cardiac stress test stage (7015815955) on 2010 at 59 Years.  Comments:  2012 2:00 PM Linnea Tracy  Treadmill echocardiogram is negative for inducible myocardial ischemia.  Normal resting regional and global left ventricular systolic performance with a visually-estimated ejection fraction of 55% to 65%.    2012 1:56 PM Linnea Tracy  Summary:  No evidence of cardiac ischemia on electrocardiographic stress testing.  Colonoscopy (637479444) on 3/25/2009 at 58 Years.  Comments:  2014 8:32 AM CDT - Linnea Lucas CMA  negative-no polyps/masses  Colonoscopy ([id number]) in the month of 2008 at 57 Years.  Comments:  2010 7:58 AM CDT - BERENICE DIAS  Cardiac stress test stage (0367221208) on 2007 at 57 Years.  Comments:  2012 9:22 AM Linnea Tracy  The treadmill portion of this Cardiolite stress test was  normal.  Laparoscopic repair of inguinal hernia ([id number]) on 3/31/2005 at 54 Years.  Comments:  2011 12:25 PM Linnea Young CMA  Left  Flexible sigmoidoscopy (64375202) on 2004 at 53 Years.  Left shoulder surgery in  at 40 Years.  Left knee surgery in  at 20 Years.  T & A.   Social History:        Electronic Cigarette/Vaping Assessment            Electronic Cigarette Use: Never.      Alcohol Assessment            Current, Beer (12 oz), Daily, 1  drinks/episode average.  2 drinks/episode maximum.  Ready to change: No.      Tobacco Assessment            Former smoker, quit more than 30 days ago, Cigarettes, 10 per day.  7 year(s).  Household tobacco concerns:               No.            Former smoker, quit more than 30 days ago      Substance Abuse Assessment            Current, Marijuana, 3-5 times per week, Ready to change: No.      Employment and Education Assessment            Retired, Work/School description: .  Highest education level: University degree(s).      Home and Environment Assessment            Marital status: .  Living situation: Home/Independent.  Injuries/Abuse/Neglect in household: No.               Feels unsafe at home: No.  Family/Friends available for support: Yes.      Nutrition and Health Assessment            Type of diet: Regular.  Wants to lose weight: No.  Sleeping concerns: No.  Feels highly stressed: No.      Exercise and Physical Activity Assessment            Exercise frequency: Daily.  Exercise type: Walking, Weight lifting.      Sexual Assessment            Sexually active: No.  Identifies as male, Sexual orientation: Straight or heterosexual.  History of STD: No.               Contraceptive Use Details: None.  History of sexual abuse: No.        Physical Examination   Exam at Provider Discretion   Vital Signs   10/8/2021 12:44 PM CDT Temperature Tympanic 98.7 DegF    Peripheral Pulse Rate 65 bpm    Systolic Blood Pressure 156 mmHg  HI    Diastolic Blood Pressure 91 mmHg  HI    Mean Arterial Pressure 113 mmHg    Oxygen Saturation 98 %      Eye:  Extraocular movements are intact.    HENT:  Normocephalic, Tympanic membranes are clear, Oral mucosa is moist.    Neck:  Supple, left sided incisional scar at previous radical resection.    Respiratory:  Lungs are clear to auscultation, Respirations are non-labored, Breath sounds are equal.    Cardiovascular:  Normal rate, Regular rhythm, No edema.     Gastrointestinal:  Soft, Non-tender, Non-distended, Normal bowel sounds.    Lymphatics:  No lymphadenopathy neck, axilla, groin.    Musculoskeletal:  Normal range of motion.    Integumentary:  plantar wart at base of left 5th toe.    Neurologic:  Alert, Oriented, Normal motor function, No focal deficits, No signs of cognitive impairment..    Psychiatric:  Appropriate mood & affect.       Review / Management   Results review:  INCLUDE PHQ 9.       Impression and Plan   Diagnosis     Medicare Initial / Annual Wellness Visit.     Course:  Progressing as expected.    Plan:  Please list plan for positive findings, treatment options, risk vs. benefits..    Patient Instructions:       Counseled: Patient, Discussed preventative services including  Screening for AAA (Family history or male 65-70 who has smoked more than 100 cigarettes in a lifetime.), Lipid screening, Diabetes screening, Nutrition, Bone mass, Cancer screening (cervical, breast, colon), Immunizations (flu, Pneumovax, Hep. B, Zostavax), Glaucoma screening and ECG if needed.  Appropriate weight and diet discussed.  Discussed Advance Life Directives.    Preventative services checklist reviewed, updated and copy given to patient.  Please see scanned document.       .      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 Rubén Drummond (cardio)   - normal stress echo,  2019   - C in summer, 2021 - no atherostenotic disease    .) depression - on citalopram - reduced down to 20mg daily    .) health maintenance   - CRC due '29   - continue annual PSA testing   - completed Shingrix   - expected grieving with passing of wife in summer, 2020 (lung cancer)   - completed COVID vaccination - booster today given his immunosuppression   - sildenafil prn    RTC annually

## 2022-02-16 NOTE — NURSING NOTE
Depression Screening Entered On:  10/12/2021 3:25 PM CDT    Performed On:  10/8/2021 3:24 PM CDT by Linnea Lucas CMA               Depression Screening   Little Interest - Pleasure in Activities :   Several days   Feeling Down, Depressed, Hopeless :   Several days   Initial Depression Screen Score :   2 Score   Poor Appetite or Overeating :   Not at all   Trouble Falling or Staying Asleep :   Several days   Feeling Tired or Little Energy :   Several days   Feeling Bad About Yourself :   Not at all   Trouble Concentrating :   Not at all   Moving or Speaking Slowly :   Not at all   Thoughts Better Off Dead or Hurting Self :   Not at all   Detailed Depression Screen Score :   2    Total Depression Screen Score :   4    Linnea Lucas CMA - 10/12/2021 3:24 PM CDT

## 2022-02-16 NOTE — LETTER
(Inserted Image. Unable to display)   [address] [place] [address]  September 24, 2021      TIGRE VARELA  [address] [place] [address]        Dear TIGRE,     Thank you for selecting [facility] for your healthcare needs. Below you will find the results of your recent test(s) done at our clinic.       Labs for your review.  We can discuss in more detail at future clinic visit.       Result Name Current Result Previous Result Reference Range   Glucose Level (mg/dL) ((H)) 108 9/22/2021 ((H)) 105 8/30/2021 65 - 99   BUN (mg/dL)  22 9/22/2021  18 8/30/2021 7 - 25   Creatinine Level (mg/dL)  1.21 9/22/2021  1.10 8/30/2021 0.70 - 1.25   eGFR (mL/min/1.73m2)  61 9/22/2021  68 8/30/2021 > OR = 60 -    eGFR  (mL/min/1.73m2)  70 9/22/2021  79 8/30/2021 > OR = 60 -    BUN/Creat Ratio  NOT APPLICABLE 9/22/2021  NOT APPLICABLE 8/30/2021 6 - 22   Sodium Level (mmol/L)  139 9/22/2021  139 8/30/2021 135 - 146   Potassium Level (mmol/L)  5.1 9/22/2021  4.3 8/30/2021 3.5 - 5.3   Chloride Level (mmol/L)  101 9/22/2021  101 8/30/2021 98 - 110   CO2 Level (mmol/L)  29 9/22/2021  32 8/30/2021 20 - 32   Calcium Level (mg/dL)  9.9 9/22/2021  9.5 8/30/2021 8.6 - 10.3   TSH (mIU/L) ((L)) 0.16 9/22/2021  2.28 11/25/2020 0.40 - 4.50   PSA (ng/mL)  1.50 9/22/2021  1.3 8/26/2020  - < OR = 4.00   Vitamin D 25 OH (ng/mL)  62 9/22/2021  61 8/26/2020 30 - 100       Please contact me or my assistant at [phone] if you have any questions or concerns.     Sincerely,        Eloy Cortez MD    What do your labs mean?  Below is a glossary of commonly ordered labs:  LDL - Bad Cholesterol  HDL - Good Cholesterol  AST/ALT - Liver Function  Cr/Creatinine - Kidney Function  Microalbumin - Kidney Function  BUN - Kidney Function  PSA - Prostate   TSH - Thyroid Hormone  HgbA1c - Diabetes Test  Hgb (Hemoglobin) - Red Blood Cells

## 2022-02-16 NOTE — NURSING NOTE
CAGE Assessment Entered On:  8/27/2019 2:31 PM CDT    Performed On:  8/27/2019 2:31 PM CDT by Linnea Lucas CMA               Assessment   Have you ever felt you should cut down on your drinking :   No   Have people annoyed you by criticizing your drinking :   No   Have you ever felt bad or guilty about your drinking :   No   Have you ever taken a drink first thing in the morning to steady your nerves or get rid of a hangover (Eye-opener) :   No   CAGE Score :   0    Linnea Lucas CMA - 8/27/2019 2:31 PM CDT

## 2022-02-16 NOTE — LETTER
(Inserted Image. Unable to display)                                                                                                                                                        March 22, 2021Re: TIGRE VARELA1950  [facility]  [id number] [place], [place] [address]Xf:   [facility] CenterThe following patient has been referred to your office/practice:  TIGRE VARELA Appointment:  Appointment is PendingPlease refer to the attached  clinical documentation for a summary of TIGRE's care.  Please do not hesitate to contact our office if any additional clinical questions arise.  All relevant records and transition of care documents should be mailed or faxed.Your assistance in providing continuity of care is appreciated. Sincerely, [address] [address](P) [phone](F) [phone]

## 2022-02-16 NOTE — TELEPHONE ENCOUNTER
Entered by Mai Petit CMA on August 03, 2020 12:07:06 PM CDT  Patient due back end of Aug 2020. One month protocol today      ------------------------------------------  From: [facility]  To: Eloy Cortez MD  Sent: August 1, 2020 3:44:52 PM CDT  Subject: Medication Management  Due: July 28, 2020 10:29:52 PM CDT     ** On Hold Pending Signature **     Drug: levothyroxine (levothyroxine 125 mcg (0.125 mg) oral tablet), TAKE ONE TABLET BY MOUTH EVERY DAY  Quantity: 90 unknown unit  Days Supply: 90  Refills: 3  Substitutions Allowed  Notes from Pharmacy:     Dispensed Drug: levothyroxine (levothyroxine 125 mcg (0.125 mg) oral tablet), TAKE ONE TABLET BY MOUTH EVERY DAY  Quantity: 90 unknown unit  Days Supply: 90  Refills: 3  Substitutions Allowed  Notes from Pharmacy:     ** On Hold Pending Signature **     Drug: losartan (losartan 50 mg oral tablet), TAKE ONE AND ONE-HALF TABLETS BY MOUTH EVERY DAY  Quantity: 135 unknown unit  Days Supply: 90  Refills: 3  Substitutions Allowed  Notes from Pharmacy:     Dispensed Drug: losartan (losartan 50 mg oral tablet), TAKE ONE AND ONE-HALF TABLETS BY MOUTH EVERY DAY  Quantity: 135 unknown unit  Days Supply: 90  Refills: 3  Substitutions Allowed  Notes from Pharmacy:     ** On Hold Pending Signature **     Drug: atorvastatin (atorvastatin 80 mg oral tablet), TAKE ONE TABLET BY MOUTH EVERY DAY  Quantity: 90 unknown unit  Days Supply: 90  Refills: 3  Substitutions Allowed  Notes from Pharmacy:     Dispensed Drug: atorvastatin (atorvastatin 80 mg oral tablet), TAKE ONE TABLET BY MOUTH EVERY DAY  Quantity: 90 unknown unit  Days Supply: 90  Refills: 3  Substitutions Allowed  Notes from Pharmacy:     ** On Hold Pending Signature **     Drug: citalopram (citalopram 40 mg oral tablet), TAKE ONE TABLET BY MOUTH EVERY DAY  Quantity: 90 unknown unit  Days Supply: 90  Refills: 0  Substitutions Allowed  Notes from Pharmacy:     Dispensed Drug: citalopram (citalopram  40 mg oral tablet), TAKE ONE TABLET BY MOUTH EVERY DAY  Quantity: 90 unknown unit  Days Supply: 90  Refills: 0  Substitutions Allowed  Notes from Pharmacy:  ------------------------------------------  ** Submitted: **  Order:atorvastatin (atorvastatin 80 mg oral tablet)  1 tab(s)  Oral  daily  Qty:  30 tab(s)        Refills:  0          Substitutions Allowed     [facility] - [facility]    Signed by Mai Petit CMA  8/3/2020 5:07:00 PM UT    ** Submitted: **  Complete:atorvastatin (atorvastatin 80 mg oral tablet)   Signed by Mai Petit CMA  8/3/2020 5:07:00 PM UT    ** Not Approved:  **  atorvastatin (ATORVASTATIN CALCIUM 80MG TABS)  TAKE ONE TABLET BY MOUTH EVERY DAY  Qty:  90 unknown unit        Days Supply:  90        Refills:  3          Substitutions Allowed     [facility] - [facility]   Signed by Mai Petit CMA  ** Submitted: **  Order:citalopram (citalopram 40 mg oral tablet)  1 tab(s)  Oral  daily  Qty:  30 tab(s)        Refills:  0          Substitutions Allowed     [facility]acy - [facility]    Signed by Mai Petit CMA  8/3/2020 5:07:00 PM UT    ** Submitted: **  Complete:citalopram (citalopram 40 mg oral tablet)   Signed by Mai Petit CMA  8/3/2020 5:07:00 PM UT    ** Not Approved:  **  citalopram (CITALOPRAM HYDROBROMIDE 40MG TABS)  TAKE ONE TABLET BY MOUTH EVERY DAY  Qty:  90 unknown unit        Days Supply:  90        Refills:  0          Substitutions Allowed     Route [facility] - [facility]   Signed by Mai Petit CMA  ** Submitted: **  Order:levothyroxine (levothyroxine 125 mcg (0.125 mg) oral tablet)  1 tab(s)  Oral  daily  Qty:  30 tab(s)        Refills:  0          Substitutions Allowed     [facility] - [facility]    Signed by Mai Petit CMA  8/3/2020 5:08:00 PM UT    ** Submitted: **  Complete:levothyroxine  (levothyroxine 125 mcg (0.125 mg) oral tablet)   Signed by Mai Petit CMA  8/3/2020 5:08:00 PM [facility]    ** Not Approved:  **  levothyroxine (LEVOTHYROXINE SODIUM 125MCG TABS)  TAKE ONE TABLET BY MOUTH EVERY DAY  Qty:  90 unknown unit        Days Supply:  90        Refills:  3          Substitutions Allowed     Route To [facility]   Signed by Mai Petit CMA---------------------  From: Mai Petit CMA   To: [facility]    Sent: 8/3/2020 12:08:49 PM CDT  Subject: Medication Management     ** Submitted: **  Order:losartan (losartan 50 mg oral tablet)  1.5 tab(s)  Oral  daily  Qty:  45 tab(s)        Refills:  0          Substitutions Allowed     Route To [facility]    Signed by Mai Petit CMA  8/3/2020 5:08:00 PM [facility]    ** Submitted: **  Complete:losartan (losartan 50 mg oral tablet)   Signed by Mai Petit CMA  8/3/2020 5:08:00 PM [facility]    ** Not Approved:  **  losartan (LOSARTAN POTASSIUM 50MG TABS)  TAKE ONE AND ONE-HALF TABLETS BY MOUTH EVERY DAY  Qty:  135 unknown unit        Days Supply:  90        Refills:  3          Substitutions Allowed     Route To [facility]   Signed by Mai Petit CMA

## 2022-02-16 NOTE — NURSING NOTE
Hearing and Vision Screening Entered On:  10/8/2021 12:50 PM CDT    Performed On:  10/8/2021 12:49 PM CDT by Linnea Lucas CMA               Hearing and Vision Screening   Audiogram Result Right Ear :   Fail   Audiogram Result Left Ear :   Fail   Hearing Screen Comments :   missed at 4000hz in right ear   Linnea Lucas CMA - 10/8/2021 12:49 PM CDT

## 2022-02-16 NOTE — TELEPHONE ENCOUNTER
---------------------  From: Damaso HENDERSON Mai   Sent: 8/24/2020 3:25:31 PM CDT  Subject: Appt?     PCP:   SAMARIA      Time of Call:  1516       Person Calling:  Patient  Phone number:  [phone]    Returned call at: 1520    Note:   Patient called asking if he is due for an annual exam. Called back and informed his yes, he is due for fasting labs and an annual exam a few days later. He would prefer face to face. Transferred to scheduling.    Last office visit and reason:  8/27/19 Annual w/ [facility]

## 2022-02-16 NOTE — TELEPHONE ENCOUNTER
Entered by Tasia Jacinto CMA on January 17, 2019 9:06:54 AM CST  ---------------------  From: Tasia Jacinto CMA   To: [facility]    Sent: 1/17/2019 9:06:54 AM CST  Subject: Medication Management     ** Not Approved: Refill not appropriate, Refilled 1/14/19 **  atorvastatin (ATORVASTATIN CALCIUM 80MG TABS)  TAKE ONE TABLET BY MOUTH EVERY DAY  Qty:  30 unknown unit        Days Supply:  30        Refills:  0          HA     Route To Pharmacy - [facility]   Signed by Tasia Jacinto CMA            ------------------------------------------  From: [facility]  To: Eloy Cortez MD  Sent: January 16, 2019 2:16:33 PM CST  Subject: Medication Management  Due: January 17, 2019 2:16:33 PM CST    ** On Hold Pending Signature **  Drug: atorvastatin (atorvastatin 80 mg oral tablet)  TAKE ONE TABLET BY MOUTH EVERY DAY  Quantity: 30 unknown unit  Days Supply: 30        Refills: 0  Substitutions Allowed  Notes from Pharmacy:     Dispensed Drug: atorvastatin (atorvastatin 80 mg oral tablet)  TAKE ONE TABLET BY MOUTH EVERY DAY  Quantity: 30 unknown unit  Days Supply: 30        Refills: 0  Substitutions Allowed  Notes from Pharmacy:   ------------------------------------------

## 2022-03-07 ENCOUNTER — TELEPHONE (OUTPATIENT)
Dept: FAMILY MEDICINE | Facility: CLINIC | Age: 72
End: 2022-03-07
Payer: COMMERCIAL

## 2022-03-07 NOTE — TELEPHONE ENCOUNTER
Reason for Call:  prescription    Detailed comments: please call patient he would like to talk with you about a prescription that he is taking, he is having shoulder and back pain and wondering if it is from the new medication     Phone Number Patient can be reached at: Cell number on file:    Telephone Information:   Mobile [phone]       Best Time: any    Can we leave a detailed message on this number? NO    Call taken on 3/7/2022 at 4:28 PM by Rosario Garcia

## 2022-03-08 NOTE — TELEPHONE ENCOUNTER
States he was talking to a nurse from [facility] regarding his meds - Was asked if he was having an aches/ pains. States he has been dealing tingling, throbbing, aching in Bilateral shoulder, down in shoulder blades.  Wondering if it could be related to the atorvastatin, he always thought it was a MS symptom.  Wondering if it  Would be worthwhile trailing off med or switching to something different

## 2022-03-09 NOTE — TELEPHONE ENCOUNTER
Pt contacted and reviewed recommendations.  Pt expressed understanding and will follow up with us in ~1month with his symptoms

## 2022-04-08 ENCOUNTER — TELEPHONE (OUTPATIENT)
Dept: FAMILY MEDICINE | Facility: CLINIC | Age: 72
End: 2022-04-08
Payer: COMMERCIAL

## 2022-04-08 NOTE — TELEPHONE ENCOUNTER
Reason for Call:  Other Statin Medication    Detailed comments: Patient calling wanting to let dr howell know that he is going to be starting back on lipitor due to no changes in muscle/back pain.     Phone Number Patient can be reached at: Cell number on file:    Telephone Information:   Mobile [phone]       Best Time: anytime    Can we leave a detailed message on this number? YES    Call taken on 4/8/2022 at 1:44 PM by Jasmin Lake

## 2022-05-04 ENCOUNTER — OFFICE VISIT (OUTPATIENT)
Dept: FAMILY MEDICINE | Facility: CLINIC | Age: 72
End: 2022-05-04
Payer: COMMERCIAL

## 2022-05-04 VITALS
HEART RATE: 60 BPM | DIASTOLIC BLOOD PRESSURE: 79 MMHG | HEIGHT: 67 IN | TEMPERATURE: 97.8 F | WEIGHT: 151 LBS | SYSTOLIC BLOOD PRESSURE: 155 MMHG | BODY MASS INDEX: 23.7 KG/M2

## 2022-05-04 DIAGNOSIS — M54.12 CERVICAL RADICULOPATHY: Primary | ICD-10-CM

## 2022-05-04 DIAGNOSIS — M77.01 GOLFERS ELBOW OF RIGHT UPPER EXTREMITY: ICD-10-CM

## 2022-05-04 PROCEDURE — 99214 OFFICE O/P EST MOD 30 MIN: CPT | Performed by: FAMILY MEDICINE

## 2022-05-04 RX ORDER — PREDNISONE 10 MG/1
TABLET ORAL
Qty: 30 TABLET | Refills: 0 | Status: SHIPPED | OUTPATIENT
Start: 2022-05-04 | End: 2022-09-28

## 2022-05-04 NOTE — PROGRESS NOTES
Assessment & Plan     Cervical radiculopathy  Will look at prednisone taper and physical therapy consider orthopedic referral and/or MRI if not improving  - predniSONE (DELTASONE) 10 MG tablet; Take 4 tabs a day for 3 days and taper by 1 tab every 3 days  - Physical Therapy Referral    Golfers elbow of right upper extremity  We will set up with physical therapy  - Physical Therapy Referral                 No follow-ups on file.    Tristan Arellano MD  [facility]    Mejia Garay is a 71 year old who presents for the following health issues patient notes that he had been doing some yard work in the last few weeks when he started having some burning sensation from his left occiput down his left lateral neck into shoulder.  Is been there intermittently as an intense burning he notes he tried to swing a golf club yesterday and just brought it back on again.  No numbness tingling weakness.  He also notes his golfer elbow is back on the right side he had a few years ago it started after playing golf earlier this spring.  His last flare of his MS was last year he has had MRIs because of it which do show some due to degenerative changes in his neck    Musculoskeletal Problem    History of Present Illness       Reason for visit:  Hodgeman pain  Symptom onset:  1-2 weeks ago  Symptoms include:  Sharp pain from ear to shoulder on left side  Symptom intensity:  Severe  Symptom progression:  Staying the same  Had these symptoms before:  No  What makes it worse:  Moving neck fast    He eats 2-3 servings of fruits and vegetables daily.He consumes 0 sweetened beverage(s) daily.He exercises with enough effort to increase his heart rate 30 to 60 minutes per day.  He exercises with enough effort to increase his heart rate 6 days per week.   He is taking medications regularly.     Also, R elbow tendonitis - hx of 15 years ago and now starting to bother again.      Review of Systems   Constitutional,  "HEENT, cardiovascular, pulmonary, gi and gu systems are negative, except as otherwise noted.      Objective    BP (!) 155/79 (BP Location: Right arm, Patient Position: Sitting, Cuff Size: Adult Regular)   Pulse 60   Temp 97.8  F (36.6  C) (Temporal)   Ht 1.702 m (5' 7\")   Wt 68.5 kg (151 lb)   BMI 23.65 kg/m    Body mass index is 23.65 kg/m .  Physical Exam   Patient alert no acute distress neck reveals some decreased range of motion with exacerbation of his left neck symptoms with movement.  Mild tenderness noted upper extremity CMS intact tenderness over right medial epicondyle                 "

## 2022-09-19 DIAGNOSIS — I10 BENIGN ESSENTIAL HYPERTENSION: ICD-10-CM

## 2022-09-19 DIAGNOSIS — E78.00 PURE HYPERCHOLESTEROLEMIA: ICD-10-CM

## 2022-09-19 DIAGNOSIS — E03.9 HYPOTHYROIDISM: Primary | ICD-10-CM

## 2022-09-19 DIAGNOSIS — C09.9 SQUAMOUS CELL CARCINOMA OF TONSIL (H): ICD-10-CM

## 2022-09-19 RX ORDER — LEVOTHYROXINE SODIUM 112 UG/1
TABLET ORAL
Qty: 90 TABLET | Refills: 0 | Status: SHIPPED | OUTPATIENT
Start: 2022-09-19 | End: 2022-09-28

## 2022-09-19 RX ORDER — CITALOPRAM HYDROBROMIDE 20 MG/1
TABLET ORAL
Qty: 90 TABLET | Refills: 0 | Status: SHIPPED | OUTPATIENT
Start: 2022-09-19 | End: 2022-09-28

## 2022-09-19 RX ORDER — AMLODIPINE BESYLATE 5 MG/1
TABLET ORAL
Qty: 90 TABLET | Refills: 3 | Status: SHIPPED | OUTPATIENT
Start: 2022-09-19 | End: 2022-09-20

## 2022-09-19 RX ORDER — LOSARTAN POTASSIUM 50 MG/1
TABLET ORAL
Qty: 135 TABLET | Refills: 0 | Status: SHIPPED | OUTPATIENT
Start: 2022-09-19 | End: 2022-09-28

## 2022-09-19 NOTE — TELEPHONE ENCOUNTER
Prescription approved per [facility] Refill Protocol.  appt scheduled; lab orders in place  Last Written Prescription Date: 10/8/21  Last office visit: 5/4/2022 with prescribing provider  Future Office Visit:   Next 5 appointments (look out 90 days)    Sep 28, 2022  8:00 AM  (Arrive by 7:40 AM)  Annual Wellness Visit with Danyel Cortez MD  [facility] ([facility] ) [address] [place] [id number] [phone]

## 2022-09-20 DIAGNOSIS — I10 BENIGN ESSENTIAL HYPERTENSION: ICD-10-CM

## 2022-09-20 RX ORDER — AMLODIPINE BESYLATE 5 MG/1
TABLET ORAL
Qty: 90 TABLET | Refills: 3 | Status: SHIPPED | OUTPATIENT
Start: 2022-09-20 | End: 2023-09-20

## 2022-09-21 ENCOUNTER — LAB (OUTPATIENT)
Dept: LAB | Facility: CLINIC | Age: 72
End: 2022-09-21
Payer: COMMERCIAL

## 2022-09-21 DIAGNOSIS — Z11.59 NEED FOR HEPATITIS C SCREENING TEST: Primary | ICD-10-CM

## 2022-09-21 DIAGNOSIS — G35 MULTIPLE SCLEROSIS (H): ICD-10-CM

## 2022-09-21 DIAGNOSIS — E78.00 PURE HYPERCHOLESTEROLEMIA: ICD-10-CM

## 2022-09-21 DIAGNOSIS — Z12.5 SCREENING FOR PROSTATE CANCER: ICD-10-CM

## 2022-09-21 DIAGNOSIS — E03.9 HYPOTHYROIDISM: ICD-10-CM

## 2022-09-21 LAB
ANION GAP SERPL CALCULATED.3IONS-SCNC: 9 MMOL/L (ref 7–15)
BUN SERPL-MCNC: 23 MG/DL (ref 8–23)
CALCIUM SERPL-MCNC: 9.6 MG/DL (ref 8.8–10.2)
CHLORIDE SERPL-SCNC: 103 MMOL/L (ref 98–107)
CHOLEST SERPL-MCNC: 155 MG/DL
CREAT SERPL-MCNC: 1.18 MG/DL (ref 0.67–1.17)
DEPRECATED HCO3 PLAS-SCNC: 28 MMOL/L (ref 22–29)
GFR SERPL CREATININE-BSD FRML MDRD: 66 ML/MIN/1.73M2
GLUCOSE SERPL-MCNC: 89 MG/DL (ref 70–99)
HCV AB SERPL QL IA: NONREACTIVE
HDLC SERPL-MCNC: 52 MG/DL
LDLC SERPL CALC-MCNC: 91 MG/DL
NONHDLC SERPL-MCNC: 103 MG/DL
POTASSIUM SERPL-SCNC: 4.7 MMOL/L (ref 3.4–5.3)
PSA SERPL-MCNC: 1.85 NG/ML (ref 0–6.5)
SODIUM SERPL-SCNC: 140 MMOL/L (ref 136–145)
TRIGL SERPL-MCNC: 62 MG/DL
TSH SERPL DL<=0.005 MIU/L-ACNC: 1.09 UIU/ML (ref 0.3–4.2)

## 2022-09-21 PROCEDURE — G0103 PSA SCREENING: HCPCS

## 2022-09-21 PROCEDURE — 80061 LIPID PANEL: CPT

## 2022-09-21 PROCEDURE — 80048 BASIC METABOLIC PNL TOTAL CA: CPT

## 2022-09-21 PROCEDURE — 86803 HEPATITIS C AB TEST: CPT

## 2022-09-21 PROCEDURE — 84443 ASSAY THYROID STIM HORMONE: CPT

## 2022-09-21 PROCEDURE — 36415 COLL VENOUS BLD VENIPUNCTURE: CPT

## 2022-09-25 PROBLEM — R07.9 CHEST PAIN: Status: RESOLVED | Noted: 2021-08-27 | Resolved: 2022-09-25

## 2022-09-25 PROBLEM — G47.33 OSA ON CPAP: Status: ACTIVE | Noted: 2019-10-16

## 2022-09-25 PROBLEM — I73.00 RAYNAUD'S DISEASE: Status: ACTIVE | Noted: 2022-09-25

## 2022-09-28 ENCOUNTER — OFFICE VISIT (OUTPATIENT)
Dept: FAMILY MEDICINE | Facility: CLINIC | Age: 72
End: 2022-09-28
Payer: COMMERCIAL

## 2022-09-28 VITALS
WEIGHT: 151.4 LBS | HEART RATE: 61 BPM | BODY MASS INDEX: 23.76 KG/M2 | DIASTOLIC BLOOD PRESSURE: 80 MMHG | HEIGHT: 67 IN | SYSTOLIC BLOOD PRESSURE: 136 MMHG | TEMPERATURE: 97.4 F

## 2022-09-28 DIAGNOSIS — Z85.21 HISTORY OF LARYNGEAL CANCER: ICD-10-CM

## 2022-09-28 DIAGNOSIS — I25.119 CORONARY ARTERY DISEASE INVOLVING NATIVE CORONARY ARTERY OF NATIVE HEART WITH ANGINA PECTORIS (H): ICD-10-CM

## 2022-09-28 DIAGNOSIS — G47.33 OSA (OBSTRUCTIVE SLEEP APNEA): ICD-10-CM

## 2022-09-28 DIAGNOSIS — G35 MULTIPLE SCLEROSIS (H): ICD-10-CM

## 2022-09-28 DIAGNOSIS — F33.41 RECURRENT MAJOR DEPRESSIVE DISORDER, IN PARTIAL REMISSION (H): Primary | ICD-10-CM

## 2022-09-28 DIAGNOSIS — Z00.00 HEALTH CARE MAINTENANCE: ICD-10-CM

## 2022-09-28 DIAGNOSIS — E06.3 HYPOTHYROIDISM DUE TO HASHIMOTO'S THYROIDITIS: ICD-10-CM

## 2022-09-28 DIAGNOSIS — Z23 INFLUENZA VACCINE ADMINISTERED: ICD-10-CM

## 2022-09-28 DIAGNOSIS — C09.9 SQUAMOUS CELL CARCINOMA OF TONSIL (H): ICD-10-CM

## 2022-09-28 DIAGNOSIS — E78.00 PURE HYPERCHOLESTEROLEMIA: ICD-10-CM

## 2022-09-28 PROCEDURE — G0439 PPPS, SUBSEQ VISIT: HCPCS | Performed by: INTERNAL MEDICINE

## 2022-09-28 PROCEDURE — 90662 IIV NO PRSV INCREASED AG IM: CPT | Performed by: INTERNAL MEDICINE

## 2022-09-28 PROCEDURE — G0008 ADMIN INFLUENZA VIRUS VAC: HCPCS | Performed by: INTERNAL MEDICINE

## 2022-09-28 PROCEDURE — 99214 OFFICE O/P EST MOD 30 MIN: CPT | Mod: 25 | Performed by: INTERNAL MEDICINE

## 2022-09-28 RX ORDER — CITALOPRAM HYDROBROMIDE 20 MG/1
20 TABLET ORAL DAILY
Qty: 90 TABLET | Refills: 3 | Status: SHIPPED | OUTPATIENT
Start: 2022-09-28 | End: 2023-08-31

## 2022-09-28 RX ORDER — LOSARTAN POTASSIUM 50 MG/1
75 TABLET ORAL DAILY
Qty: 135 TABLET | Refills: 3 | Status: SHIPPED | OUTPATIENT
Start: 2022-09-28 | End: 2023-08-31

## 2022-09-28 RX ORDER — ATORVASTATIN CALCIUM 80 MG/1
80 TABLET, FILM COATED ORAL DAILY
Qty: 30 TABLET | Refills: 11 | Status: SHIPPED | OUTPATIENT
Start: 2022-09-28 | End: 2023-04-05

## 2022-09-28 RX ORDER — LEVOTHYROXINE SODIUM 112 UG/1
112 TABLET ORAL DAILY
Qty: 90 TABLET | Refills: 3 | Status: SHIPPED | OUTPATIENT
Start: 2022-09-28 | End: 2023-10-18

## 2022-09-28 RX ORDER — GABAPENTIN 300 MG/1
600 CAPSULE ORAL 3 TIMES DAILY
Qty: 540 CAPSULE | Refills: 3 | Status: SHIPPED | OUTPATIENT
Start: 2022-09-28 | End: 2023-08-31

## 2022-09-28 ASSESSMENT — ENCOUNTER SYMPTOMS
DYSURIA: 0
DIZZINESS: 0
SORE THROAT: 0
CHILLS: 0
HEMATURIA: 0
ARTHRALGIAS: 0
ABDOMINAL PAIN: 0
MYALGIAS: 0
COUGH: 0
HEARTBURN: 0
JOINT SWELLING: 0
SHORTNESS OF BREATH: 0
HEADACHES: 0
HEMATOCHEZIA: 0
FEVER: 0
PARESTHESIAS: 0
DIARRHEA: 0
PALPITATIONS: 0
FREQUENCY: 0
NERVOUS/ANXIOUS: 0
EYE PAIN: 0
CONSTIPATION: 0
NAUSEA: 0
WEAKNESS: 0

## 2022-09-28 ASSESSMENT — PATIENT HEALTH QUESTIONNAIRE - PHQ9
10. IF YOU CHECKED OFF ANY PROBLEMS, HOW DIFFICULT HAVE THESE PROBLEMS MADE IT FOR YOU TO DO YOUR WORK, TAKE CARE OF THINGS AT HOME, OR GET ALONG WITH OTHER PEOPLE: NOT DIFFICULT AT ALL
SUM OF ALL RESPONSES TO PHQ QUESTIONS 1-9: 3
SUM OF ALL RESPONSES TO PHQ QUESTIONS 1-9: 3

## 2022-09-28 ASSESSMENT — LIFESTYLE VARIABLES
AUDIT-C TOTAL SCORE: 4
HOW OFTEN DO YOU HAVE A DRINK CONTAINING ALCOHOL: 4 OR MORE TIMES A WEEK
HOW OFTEN DO YOU HAVE SIX OR MORE DRINKS ON ONE OCCASION: NEVER
SKIP TO QUESTIONS 9-10: 1
HOW MANY STANDARD DRINKS CONTAINING ALCOHOL DO YOU HAVE ON A TYPICAL DAY: 1 OR 2

## 2022-09-28 ASSESSMENT — ACTIVITIES OF DAILY LIVING (ADL): CURRENT_FUNCTION: NO ASSISTANCE NEEDED

## 2022-09-28 NOTE — ASSESSMENT & PLAN NOTE
resected in '11 - follows through [facility] remotely   - HPV related   - no further surveillance imaging

## 2022-09-28 NOTE — ASSESSMENT & PLAN NOTE
relapsing/remitting - follows with neurology   - on glatiramer    - takes baclofen for spasticity - well controlled

## 2022-09-28 NOTE — ASSESSMENT & PLAN NOTE
- strong family h/o CAD   - on ASA 325mg daily, atorvastatin 80mg qhs   - follows with Dr. Rubén Drummond (cardio)   - normal stress echo, 2019   - [facility] in summer, 2021 - no atherostenotic disease

## 2022-09-28 NOTE — PROGRESS NOTES
"SUBJECTIVE:   Jarrod is a 72 year old who presents for Preventive Visit.  Generally doing well.  Still with summertime MS flares.  Feels more restricted with travel options due to caring for cats with medical needs.  Stable MS medication regimen.  There has been discussion about him potentially coming off of Copaxone sometime in the future given age.  No anginal complaints.  Good tolerance of golfing activities.  Mood seems stable.      Patient has been advised of split billing requirements and indicates understanding: Yes  Are you in the first 12 months of your Medicare coverage?  No    Healthy Habits:     In general, how would you rate your overall health?  Good    Frequency of exercise:  6-7 days/week    Duration of exercise:  30-45 minutes    Do you usually eat at least 4 servings of fruit and vegetables a day, include whole grains    & fiber and avoid regularly eating high fat or \"junk\" foods?  Yes    Taking medications regularly:  Yes    Medication side effects:  None    Ability to successfully perform activities of daily living:  No assistance needed    Home Safety:  No safety concerns identified    Hearing Impairment:  No hearing concerns    In the past 6 months, have you been bothered by leaking of urine?  No    In general, how would you rate your overall mental or emotional health?  Good      PHQ-2 Total Score: 1    Additional concerns today:  No    Do you feel safe in your environment? Yes    Have you ever done Advance Care Planning? (For example, a Health Directive, POLST, or a discussion with a medical provider or your loved ones about your wishes): Yes, advance care planning is on file.      Right Ear:      1000 Hz RESPONSE- on Level:   20 db  (Conditioning sound)   1000 Hz: RESPONSE- on Level:   20 db    2000 Hz: RESPONSE- on Level:   20 db    4000 Hz: RESPONSE- on Level:   20 db     Left Ear:      4000 Hz: RESPONSE- on Level: tone not heard   2000 Hz: RESPONSE- on Level:   20 db    1000 Hz: RESPONSE- " on Level:   20 db     500 Hz: RESPONSE- on Level:   20 db     Right Ear:    500 Hz: RESPONSE- on Level:   20 db     Hearing Acuity: REFER    Hearing Assessment: abnormal--  Fall risk  Fallen 2 or more times in the past year?: No  Any fall with injury in the past year?: No    Cognitive Screening   1) Repeat 3 items Banana, Sunrise, Chair    2) Clock draw: NORMAL  3) 3 item recall: Recalls 3 objects  Results: 3 items recalled: COGNITIVE IMPAIRMENT LESS LIKELY    Mini-CogTM Copyright S Emely. Licensed by the author for use in [facility]; reprinted with permission (jackson@[email or web address]). All rights reserved.        Reviewed and updated as needed this visit by clinical staff                    Reviewed and updated as needed this visit by Provider                   Social History     Tobacco Use     Smoking status: Former Smoker     Quit date: 1977     Years since quittin.1     Smokeless tobacco: Never Used   Substance Use Topics     Alcohol use: Not on file     If you drink alcohol do you typically have >3 drinks per day or >7 drinks per week? No    Alcohol Use 2022   Prescreen: >3 drinks/day or >7 drinks/week? Yes     AUDIT - Alcohol Use Disorders Identification Test - Reproduced from the World Health Organization Audit 2001 (Second Edition) 2022   1.  How often do you have a drink containing alcohol? 4 or more times a week   2.  How many drinks containing alcohol do you have on a typical day when you are drinking? 1 or 2   3.  How often do you have five or more drinks on one occasion? Never   9.  Have you or someone else been injured because of your drinking? No   10. Has a relative, friend, doctor or other health care worker been concerned about your drinking or suggested you cut down? No         Current providers sharing in care for this patient include:   Patient Care Team:  Danyel Cortez MD as PCP - General (HOSPITALIST)  Tramaine Drummond MD as Assigned Heart and Vascular  Provider  Danyel Cortez MD as Assigned PCP    The following health maintenance items are reviewed in Epic and correct as of today:  Health Maintenance   Topic Date Due     ADVANCE CARE PLANNING  Never done     DEPRESSION ACTION PLAN  Never done     AORTIC ANEURYSM SCREENING (SYSTEM ASSIGNED)  Never done     MEDICARE ANNUAL WELLNESS VISIT  09/09/2021     COVID-19 Vaccine (5 - Booster for Moderna series) 08/02/2022     INFLUENZA VACCINE (1) 09/01/2022     PHQ-9  03/28/2023     ANNUAL REVIEW OF HM ORDERS  09/08/2023     BMP  09/21/2023     LIPID  09/21/2023     TSH W/FREE T4 REFLEX  09/21/2023     FALL RISK ASSESSMENT  09/28/2023     DTAP/TDAP/TD IMMUNIZATION (3 - Td or Tdap) 08/29/2024     COLORECTAL CANCER SCREENING  10/30/2029     HEPATITIS C SCREENING  Completed     Pneumococcal Vaccine: 65+ Years  Completed     ZOSTER IMMUNIZATION  Completed     IPV IMMUNIZATION  Aged Out     MENINGITIS IMMUNIZATION  Aged Out     HEPATITIS B IMMUNIZATION  Aged Out         Review of Systems   Constitutional: Negative for chills and fever.   HENT: Negative for congestion, ear pain, hearing loss and sore throat.    Eyes: Negative for pain and visual disturbance.   Respiratory: Negative for cough and shortness of breath.    Cardiovascular: Negative for chest pain, palpitations and peripheral edema.   Gastrointestinal: Negative for abdominal pain, constipation, diarrhea, heartburn, hematochezia and nausea.   Genitourinary: Negative for dysuria, frequency, genital sores, hematuria, impotence and urgency.   Musculoskeletal: Negative for arthralgias, joint swelling and myalgias.   Skin: Negative for rash.   Neurological: Negative for dizziness, weakness, headaches and paresthesias.   Psychiatric/Behavioral: Negative for mood changes. The patient is not nervous/anxious.          OBJECTIVE:   There were no vitals taken for this visit. Estimated body mass index is 23.65 kg/m  as calculated from the following:    Height as of 5/4/22:  "1.702 m (5' 7\").    Weight as of 5/4/22: 68.5 kg (151 lb).  Physical Exam  GENERAL: healthy, alert and no distress  NECK: no adenopathy, no asymmetry, masses, or scars and thyroid normal to palpation  RESP: lungs clear to auscultation - no rales, rhonchi or wheezes  CV: regular rate and rhythm, normal S1 S2, no S3 or S4, no murmur, click or rub, no peripheral edema and peripheral pulses strong  ABDOMEN: soft, nontender, no hepatosplenomegaly, no masses and bowel sounds normal  MS: no gross musculoskeletal defects noted, no edema    Diagnostic Test Results:  Labs reviewed in Epic    ASSESSMENT / PLAN:     Problem List Items Addressed This Visit        Nervous and Auditory    Multiple sclerosis (H)     relapsing/remitting - follows with neurology   - on glatiramer    - takes baclofen for spasticity - well controlled           Relevant Medications    gabapentin (NEURONTIN) 300 MG capsule    Coronary artery disease involving native coronary artery of native heart with angina pectoris (H)      - strong family h/o CAD   - on ASA 325mg daily, atorvastatin 80mg qhs   - follows with Dr. Rubén Drummond (cardio)   - normal stress echo, 2019   - [facility] in summer, 2021 - no atherostenotic disease           Relevant Medications    atorvastatin (LIPITOR) 80 MG tablet       Respiratory    ELODIA (obstructive sleep apnea)     some endorsed sleep apneic features (witnessed pauses, heavy snoring)   - h/o laryngeal resection   - sleep study ('18) demonstrating moderate sleep apnea - opting not to pursue CPAP at this time           History of laryngeal cancer     resected in '11 - follows through [facility] remotely   - HPV related   - no further surveillance imaging              Endocrine    Pure hypercholesterolemia    Relevant Medications    atorvastatin (LIPITOR) 80 MG tablet    losartan (COZAAR) 50 MG tablet       Behavioral    Recurrent major depressive disorder, in partial remission (H) - Primary     on citalopram 20mg daily           " "Relevant Medications    citalopram (CELEXA) 20 MG tablet       Other    Health care maintenance     - CRC due '29   - continue annual PSA testing   - completed Shingrix   - expected grieving with passing of wife in summer, 2020 (lung cancer)   - completed COVID vaccination + boosterx2; discussed planned Bivalent vaccine  - influenza today   - sildenafil prn             Other Visit Diagnoses     Influenza vaccine administered        Relevant Orders    INFLUENZA, QUAD, HIGH DOSE, PF, 65YR + (FLUZONE HD) (Completed)    Squamous cell carcinoma of tonsil (H)        Relevant Medications    citalopram (CELEXA) 20 MG tablet    Hypothyroidism        Relevant Medications    levothyroxine (SYNTHROID/LEVOTHROID) 112 MCG tablet    Hypothyroidism due to Hashimoto's thyroiditis        Relevant Medications    levothyroxine (SYNTHROID/LEVOTHROID) 112 MCG tablet            COUNSELING:  Reviewed preventive health counseling, as reflected in patient instructions       Regular exercise       Healthy diet/nutrition       Hearing screening       Immunizations    Vaccinated for: Influenza             Hepatitis C screening       Colon cancer screening       Prostate cancer screening    Estimated body mass index is 23.65 kg/m  as calculated from the following:    Height as of 5/4/22: 1.702 m (5' 7\").    Weight as of 5/4/22: 68.5 kg (151 lb).        He reports that he quit smoking about 45 years ago. He has never used smokeless tobacco.      Appropriate preventive services were discussed with this patient, including applicable screening as appropriate for cardiovascular disease, diabetes, osteopenia/osteoporosis, and glaucoma.  As appropriate for age/gender, discussed screening for colorectal cancer, prostate cancer, breast cancer, and cervical cancer. Checklist reviewing preventive services available has been given to the patient.    Reviewed patients plan of care and provided an AVS. The Basic Care Plan (routine screening as documented in " Health Maintenance) for Jarrod meets the Care Plan requirement. This Care Plan has been established and reviewed with the Patient.    Counseling Resources:  ATP IV Guidelines  Pooled Cohorts Equation Calculator  Breast Cancer Risk Calculator  Breast Cancer: Medication to Reduce Risk  FRAX Risk Assessment  ICSI Preventive Guidelines  Dietary Guidelines for Americans, 2010  [employer]'s MyPlate  ASA Prophylaxis  Lung CA Screening    Danyel Cortez MD  [facility]    Identified Health Risks:  Answers for HPI/ROS submitted by the patient on 9/28/2022  If you checked off any problems, how difficult have these problems made it for you to do your work, take care of things at home, or get along with other people?: Not difficult at all  PHQ9 TOTAL SCORE: 3

## 2022-09-28 NOTE — ASSESSMENT & PLAN NOTE
some endorsed sleep apneic features (witnessed pauses, heavy snoring)   - h/o laryngeal resection   - sleep study ('18) demonstrating moderate sleep apnea - opting not to pursue CPAP at this time

## 2022-09-28 NOTE — ASSESSMENT & PLAN NOTE
- CRC due '29   - continue annual PSA testing   - completed Shingrix   - expected grieving with passing of wife in summer, 2020 (lung cancer)   - completed COVID vaccination + boosterx2; discussed planned Bivalent vaccine  - influenza today   - sildenafil prn

## 2022-11-23 ENCOUNTER — TELEPHONE (OUTPATIENT)
Dept: FAMILY MEDICINE | Facility: CLINIC | Age: 72
End: 2022-11-23

## 2022-11-23 ENCOUNTER — NURSE TRIAGE (OUTPATIENT)
Dept: NURSING | Facility: CLINIC | Age: 72
End: 2022-11-23

## 2022-11-23 ENCOUNTER — VIRTUAL VISIT (OUTPATIENT)
Dept: FAMILY MEDICINE | Facility: CLINIC | Age: 72
End: 2022-11-23
Payer: COMMERCIAL

## 2022-11-23 DIAGNOSIS — U07.1 INFECTION DUE TO 2019 NOVEL CORONAVIRUS: Primary | ICD-10-CM

## 2022-11-23 PROCEDURE — 99213 OFFICE O/P EST LOW 20 MIN: CPT | Mod: CS | Performed by: NURSE PRACTITIONER

## 2022-11-23 NOTE — TELEPHONE ENCOUNTER
Triage Call:     Pt calling to report that he tested positive for COVID-19 at home  Pt started feeling sick a few days ago on Sunday    Body aches  Cough resolved  No fever  No difficulty breathing    Pt reports that he has MS and when he gets sick he usually feels body aches on his chest and arm areas. This is where he feels body aches today.     02: 95% on RA  Pulse: 58  T: 95.5    Disposition: Virtual Thuy. Pt was given the care advice and was warm transferred to scheduling to make an appt with a provider to discuss treatment options.     Kathy Berger RN  [facility] Nurse Advisor 9:41 AM 11/23/2022      How can I protect others?    These guidelines are for isolating before returning to work, school or .       If you DO have symptoms:  o Stay home and away from others  - For at least 5 days after your symptoms started, AND   - You are fever free for 24 hours (with no medicine that reduces fever), AND  - Your other symptoms are better.  o Wear a mask for 10 full days any time you are around others.    If you DON'T have symptoms:  o Stay at home and away from others for at least 5 days after your positive test.  o Wear a mask for 10 full days any time you are around others.    There may be different guidelines for healthcare facilities. Please check with the specific sites before arriving.     If you've been told by a doctor that you were severely ill with COVID-19 or are immunocompromised, you should isolate for at least 10 days.    You should not go back to work until you meet the guidelines above for ending your home isolation. You don't need to be retested for COVID-19 before going back to work--studies show that you won't spread the virus if it's been at least 10 days since your symptoms started (or 20 days, if you have a weak immune system).    Reason for Disposition    HIGH RISK for severe COVID complications (e.g., weak immune system, age > 64 years, obesity with BMI > 25, pregnant, chronic lung  disease or other chronic medical condition) (Exception: Already seen by PCP and no new or worsening symptoms.)    Additional Information    Negative: SEVERE difficulty breathing (e.g., struggling for each breath, speaks in single words)    Negative: Difficult to awaken or acting confused (e.g., disoriented, slurred speech)    Negative: Bluish (or gray) lips or face now    Negative: Shock suspected (e.g., cold/pale/clammy skin, too weak to stand, low BP, rapid pulse)    Negative: Sounds like a life-threatening emergency to the triager    Negative: [1] Diagnosed or suspected COVID-19 AND [2] symptoms lasting 3 or more weeks    Negative: [1] COVID-19 exposure AND [2] no symptoms    Negative: COVID-19 vaccine reaction suspected (e.g., fever, headache, muscle aches) occurring 1 to 3 days after getting vaccine    Negative: COVID-19 vaccine, questions about    Negative: [1] Lives with someone known to have influenza (flu test positive) AND [2] flu-like symptoms (e.g., cough, runny nose, sore throat, SOB; with or without fever)    Negative: [1] Adult with possible COVID-19 symptoms AND [2] triager concerned about severity of symptoms or other causes    Negative: COVID-19 and breastfeeding, questions about    Negative: SEVERE or constant chest pain or pressure  (Exception: Mild central chest pain, present only when coughing.)    Negative: MODERATE difficulty breathing (e.g., speaks in phrases, SOB even at rest, pulse 100-120)    Negative: Headache and stiff neck (can't touch chin to chest)    Negative: Oxygen level (e.g., pulse oximetry) 90 percent or lower    Negative: Chest pain or pressure    Negative: Patient sounds very sick or weak to the triager    Negative: MILD difficulty breathing (e.g., minimal/no SOB at rest, SOB with walking, pulse <100)    Negative: Fever > 103 F (39.4 C)    Negative: [1] Fever > 101 F (38.3 C) AND [2] over 60 years of age    Negative: [1] Fever > 100.0 F (37.8 C) AND [2] bedridden (e.g.,  nursing home patient, CVA, chronic illness, recovering from surgery)    Protocols used: CORONAVIRUS (COVID-19) DIAGNOSED OR [id number] 1.18.2022

## 2022-11-23 NOTE — TELEPHONE ENCOUNTER
TC received from [facility] Dev[facility] Mackenzie, who received Paxlovid prescription for pt. Gurdeep wants to make sure NCB discussed drug interaction/holding Amlodipine while taking Paxlovid. As well as viagra and atorvastatin.     Please advise on holding Amlodipine.

## 2022-11-23 NOTE — TELEPHONE ENCOUNTER
TC with Gurdeep, given message per Petrona Milan. He will  pt on amlodipine dose 2.5mg for 10 days.

## 2022-11-23 NOTE — TELEPHONE ENCOUNTER
Two of the 3 sources I checked did not comment on amlodipine (Calcium channel blocker). The 3rd sources says to cut his amlodipine dose in half (half of the 5 mg tab is 2 1/2 mg).  Yes I discussed the atorvastatin with pt, holding x10 days.  If pharmacy would please  pt to cut the amlodipine dose in half, I would appreciate it.

## 2023-01-24 ENCOUNTER — OFFICE VISIT (OUTPATIENT)
Dept: FAMILY MEDICINE | Facility: CLINIC | Age: 73
End: 2023-01-24
Payer: COMMERCIAL

## 2023-01-24 VITALS
OXYGEN SATURATION: 99 % | DIASTOLIC BLOOD PRESSURE: 64 MMHG | TEMPERATURE: 97.5 F | SYSTOLIC BLOOD PRESSURE: 138 MMHG | RESPIRATION RATE: 20 BRPM | HEART RATE: 65 BPM | BODY MASS INDEX: 22.58 KG/M2 | HEIGHT: 68 IN | WEIGHT: 149 LBS

## 2023-01-24 DIAGNOSIS — Z11.3 SCREEN FOR STD (SEXUALLY TRANSMITTED DISEASE): Primary | ICD-10-CM

## 2023-01-24 LAB
HIV 1+2 AB+HIV1 P24 AG SERPL QL IA: NONREACTIVE
T PALLIDUM AB SER QL: NONREACTIVE

## 2023-01-24 PROCEDURE — 36415 COLL VENOUS BLD VENIPUNCTURE: CPT | Performed by: PHYSICIAN ASSISTANT

## 2023-01-24 PROCEDURE — 87389 HIV-1 AG W/HIV-1&-2 AB AG IA: CPT | Performed by: PHYSICIAN ASSISTANT

## 2023-01-24 PROCEDURE — 86780 TREPONEMA PALLIDUM: CPT | Performed by: PHYSICIAN ASSISTANT

## 2023-01-24 PROCEDURE — 99213 OFFICE O/P EST LOW 20 MIN: CPT | Performed by: PHYSICIAN ASSISTANT

## 2023-01-24 PROCEDURE — 86695 HERPES SIMPLEX TYPE 1 TEST: CPT | Performed by: PHYSICIAN ASSISTANT

## 2023-01-24 PROCEDURE — 86696 HERPES SIMPLEX TYPE 2 TEST: CPT | Performed by: PHYSICIAN ASSISTANT

## 2023-01-24 ASSESSMENT — ENCOUNTER SYMPTOMS: CONSTITUTIONAL NEGATIVE: 1

## 2023-01-24 NOTE — PROGRESS NOTES
Assessment & Plan     Screen for STD (sexually transmitted disease)  Screen today patient education given best protection is to use condoms and avoid activity if he were to have an outbreak of herpes  No prevention for spreading of HPV and told him it is very likely that his partner has already been exposed sometime in her life  - Herpes Simplex Virus 1 and 2 IgG  - Treponema Abs w Reflex to RPR and Titer  - HIV Antigen Antibody Combo                   No follow-ups on file.    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 he said no evidence of recurrence  He has had 3 episodes of what he thinks were genital herpes previously  He has no other symptoms  We discussed other screening he declines GC or chlamydia screening  Would like to be screened to see if he has antibodies to herpes and then for syphilis and HIV    History of Present Illness       Reason for visit:  Check for std  Symptom onset:  More than a month  Symptoms include:  None  Symptom intensity:  Mild  Symptom progression:  Staying the same  Had these symptoms before:  Yes  Has tried/received treatment for these symptoms:  No    He eats 2-3 servings of fruits and vegetables daily.He consumes 0 sweetened beverage(s) daily.He exercises with enough effort to increase his heart rate 30 to 60 minutes per day.  He exercises with enough effort to increase his heart rate 7 days per week.   He is taking medications regularly.             Review of Systems   Constitutional: Negative.    Genitourinary: Negative.             Objective    /64   Pulse 65   Temp 97.5  F  "(36.4  C)   Resp 20   Ht 1.727 m (5' 8\")   Wt 67.6 kg (149 lb)   SpO2 99%   BMI 22.66 kg/m    Body mass index is 22.66 kg/m .  Physical Exam alert no acute distress no exam carried out today                      "

## 2023-01-25 LAB
HSV1 IGG SERPL QL IA: 58 INDEX
HSV1 IGG SERPL QL IA: ABNORMAL
HSV2 IGG SERPL QL IA: 6.83 INDEX
HSV2 IGG SERPL QL IA: ABNORMAL

## 2023-03-28 ENCOUNTER — OFFICE VISIT (OUTPATIENT)
Dept: FAMILY MEDICINE | Facility: CLINIC | Age: 73
End: 2023-03-28
Payer: COMMERCIAL

## 2023-03-28 VITALS
WEIGHT: 149.9 LBS | OXYGEN SATURATION: 99 % | SYSTOLIC BLOOD PRESSURE: 148 MMHG | RESPIRATION RATE: 16 BRPM | DIASTOLIC BLOOD PRESSURE: 77 MMHG | HEIGHT: 68 IN | BODY MASS INDEX: 22.72 KG/M2 | TEMPERATURE: 97.8 F | HEART RATE: 63 BPM

## 2023-03-28 DIAGNOSIS — G35 MULTIPLE SCLEROSIS (H): ICD-10-CM

## 2023-03-28 DIAGNOSIS — F33.41 RECURRENT MAJOR DEPRESSIVE DISORDER, IN PARTIAL REMISSION (H): ICD-10-CM

## 2023-03-28 DIAGNOSIS — G47.33 OSA (OBSTRUCTIVE SLEEP APNEA): Primary | ICD-10-CM

## 2023-03-28 PROCEDURE — 99214 OFFICE O/P EST MOD 30 MIN: CPT | Performed by: INTERNAL MEDICINE

## 2023-03-28 ASSESSMENT — ENCOUNTER SYMPTOMS
APNEA: 1
UNEXPECTED WEIGHT CHANGE: 0
DIFFICULTY URINATING: 0
HEARTBURN: 0
COUGH: 0
PALPITATIONS: 0
SHORTNESS OF BREATH: 0
SLEEP DISTURBANCE: 0
HEADACHES: 0

## 2023-03-28 ASSESSMENT — PATIENT HEALTH QUESTIONNAIRE - PHQ9
10. IF YOU CHECKED OFF ANY PROBLEMS, HOW DIFFICULT HAVE THESE PROBLEMS MADE IT FOR YOU TO DO YOUR WORK, TAKE CARE OF THINGS AT HOME, OR GET ALONG WITH OTHER PEOPLE: NOT DIFFICULT AT ALL
SUM OF ALL RESPONSES TO PHQ QUESTIONS 1-9: 1
SUM OF ALL RESPONSES TO PHQ QUESTIONS 1-9: 1

## 2023-03-28 NOTE — PROGRESS NOTES
Assessment & Plan   Problem List Items Addressed This Visit        Nervous and Auditory    Multiple sclerosis (H)       Respiratory    ELODIA (obstructive sleep apnea) - Primary     - 8/21/2018 HST showed moderate obstructive sleep apnea with AHI 24 and oximetry maxine 80%.  Central apneas noted at 8.9/h with 23% Cheyne-Metz respiration  -9/19/2018 optimal CPAP titration to 6 cm of water pressure  -12/13/2018 CPAP discontinued as it was ineffective, uncomfortable, and alliance reports indicated AHI is varying from 0-76 on therapy         Relevant Orders    Comprehensive Sleep Study       Behavioral    Recurrent major depressive disorder, in partial remission (H)     - On Celexa                          Mohsen Goldman MD  [facility] [place]    Mejia Garay is a 72 year old, presenting for the following health issues:  Sleep Apnea (Has been DX with sleep apnea in the past but does not use CPAP machine )    Additional Questions 3/28/2023   Roomed by Zuleika AGUIRRE CMA   Accompanied by Self     History of Present Illness       Reason for visit:  Sleep apnea consult  Symptom onset:  More than a month  Symptoms include:  Stop breathing at night  Symptom intensity:  Mild  Symptom progression:  Staying the same  Had these symptoms before:  Yes  Has tried/received treatment for these symptoms:  Yes  Previous treatment was successful:  No    He eats 2-3 servings of fruits and vegetables daily.He consumes 0 sweetened beverage(s) daily.He exercises with enough effort to increase his heart rate 30 to 60 minutes per day.  He exercises with enough effort to increase his heart rate 5 days per week.   He is taking medications regularly.    Today's PHQ-9         PHQ-9 Total Score: 1    PHQ-9 Q9 Thoughts of better off dead/self-harm past 2 weeks :   Not at all    How difficult have these problems made it for you to do your work, take care of things at home, or get along with other people: Not difficult at  "all  Last saw the patient in 2018  We decided to stop CPAP as he did not improve.  His wife  2 years ago and he has a lady friend.  She notes apneas and is concerned.  He tolerated CPAP but it did not seem to help him and the machine AHI readings were erratic.  Would be interested in inspire device and would appear to be a good candidate.        Review of Systems   Constitutional: Negative for unexpected weight change.   Respiratory: Positive for apnea. Negative for cough and shortness of breath.    Cardiovascular: Negative for chest pain, palpitations and peripheral edema.   Gastrointestinal: Negative for heartburn.   Endocrine: Negative for polyuria.   Genitourinary: Negative for difficulty urinating and enuresis.   Neurological: Negative for headaches.   Psychiatric/Behavioral: Negative for sleep disturbance.            Objective    BP (!) 154/83 (BP Location: Right arm)   Pulse 63   Temp 97.8  F (36.6  C)   Resp 16   Ht 1.727 m (5' 8\")   Wt 68 kg (149 lb 14.4 oz)   SpO2 99%   BMI 22.79 kg/m    Body mass index is 22.79 kg/m .  Physical Exam   Patient appears comfortable in no distress  HEENT exam exam is unremarkable.  Mallampati 1  Neck is not thick  Lungs clear  Cardiac exam regular no edema  Cranial nerves normal                    "

## 2023-03-28 NOTE — ASSESSMENT & PLAN NOTE
- 8/21/2018 HST showed moderate obstructive sleep apnea with AHI 24 and oximetry maxine 80%.  Central apneas noted at 8.9/h with 23% Cheyne-Metz respiration  -9/19/2018 optimal CPAP titration to 6 cm of water pressure  -12/13/2018 CPAP discontinued as it was ineffective, uncomfortable, and alliance reports indicated AHI is varying from 0-76 on therapy

## 2023-04-05 ENCOUNTER — OFFICE VISIT (OUTPATIENT)
Dept: CARDIOLOGY | Facility: CLINIC | Age: 73
End: 2023-04-05
Payer: COMMERCIAL

## 2023-04-05 VITALS
SYSTOLIC BLOOD PRESSURE: 124 MMHG | BODY MASS INDEX: 22.64 KG/M2 | WEIGHT: 149.4 LBS | HEART RATE: 60 BPM | HEIGHT: 68 IN | DIASTOLIC BLOOD PRESSURE: 64 MMHG | RESPIRATION RATE: 16 BRPM

## 2023-04-05 DIAGNOSIS — I25.119 CORONARY ARTERY DISEASE INVOLVING NATIVE CORONARY ARTERY OF NATIVE HEART WITH ANGINA PECTORIS (H): ICD-10-CM

## 2023-04-05 DIAGNOSIS — I10 ESSENTIAL HYPERTENSION: Primary | ICD-10-CM

## 2023-04-05 PROCEDURE — 99214 OFFICE O/P EST MOD 30 MIN: CPT | Performed by: INTERNAL MEDICINE

## 2023-04-05 RX ORDER — ATORVASTATIN CALCIUM 80 MG/1
80 TABLET, FILM COATED ORAL DAILY
Qty: 90 TABLET | Refills: 1 | Status: SHIPPED | OUTPATIENT
Start: 2023-04-05 | End: 2023-10-30

## 2023-04-05 NOTE — PROGRESS NOTES
"[facility]  [phone]          Assessment/Recommendations   Patient with known moderate coronary artery stenosis on previous angiography who is doing very well from a functional capacity standpoint.  He is exercising on a regular basis, his LDL cholesterol is below 100) he has not had an acute coronary syndrome in the past) and he takes an antiplatelet agent.  Blood pressure is well controlled.    I have commended him on his regular exercise and not made any changes in his current medical regimen.  He will call if he has any new symptoms.  He will check his blood pressures at home for the next couple of weeks and follow-up with Dr. Cortez regarding appropriateness of changing any antihypertensive medications.    We will see him back in 1 year, but of course would be happy to see him sooner if questions or problems arise.  30 minutes spent with chart review, patient visit, and documentation.     History of Present Illness/Subjective    Mr. Jarrod Mancera is a 72 year old male with known coronary artery disease with an angiogram in 2021 which showed moderate lesion 50 to 55% in LAD.  Other mild disease.  He has been feeling well this past year.  He has not had any chest discomfort, unusual shortness of breath, syncope, palpitations, orthopnea, or paroxysmal nocturnal dyspnea.  He is set up for repeat sleep study in May of this year.  He feels like his blood pressure at times in the doctor's office is elevated but today is excellent.  He takes maximal dose of atorvastatin at 80 mg a day.           Physical Examination Review of Systems   /64 (BP Location: Right arm, Patient Position: Sitting, Cuff Size: Adult Regular)   Pulse 60   Resp 16   Ht 1.727 m (5' 8\")   Wt 67.8 kg (149 lb 6.4 oz)   BMI 22.72 kg/m    Body mass index is 22.72 kg/m .  Wt Readings from Last 3 Encounters:   04/05/23 67.8 kg (149 lb 6.4 oz)   03/28/23 68 kg (149 lb 14.4 oz)   01/24/23 67.6 kg (149 lb)     General " "Appearance:   Alert, cooperative and in no acute distress.   ENT/Mouth: Patient wearing a mask.      EYES:  no scleral icterus, normal conjunctivae   Neck: JVP normal. No Hepatojugular reflux. Thyroid not visualized.   Chest/Lungs:   Lungs are clear to auscultation, equal chest wall expansion.   Cardiovascular:   S1, S2 with 1/6 systolic murmur ,clicks or rubs. Brachial, radial and posterior tibial pulses are intact and symetric. No carotid bruits noted   Abdomen:  Nontender. BS+. No bruits.   Extremities: No cyanosis, clubbing or edema   Skin: no xanthelasma, warm.    Neurologic: normal arm movement bilateral, no tremors     Psychiatric: Appropriate affect.      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;     Family history of myocardial infarction      High cholesterol      Hypertension      Hypertension      MS (multiple sclerosis) (H)      Multiple sclerosis (H)     Past Surgical History:   Procedure Laterality Date     ARTHROSCOPY SHOULDER  01/01/1990    L Shoulder     COLONOSCOPY      2/2008; 3/25/2009; 10/30/2019--normal, no further screenings needed d/t adv age 77 when due     CV CORONARY ANGIOGRAM N/A 09/09/2021    Procedure: Coronary Angiogram;  Surgeon: Placido Ang MD;  [facility]     CV FRACTIONAL FLOW RATIO WIRE N/A 09/09/2021    Procedure: Fractional Flow Ratio Wire;  Surgeon: Placido Ang MD;  [facility] CV     CV LEFT VENTRICULOGRAM N/A 09/09/2021    Procedure: Left Ventriculogram;  Surgeon: Placido Ang MD;  Location: [facility] CATH LAB CV     FLEXIBLE SIGMOIDOSCOPY  02/19/2004     HEAD & NECK SURGERY      Robotic Surgery @ [facility] 2011     HERNIA REPAIR  01/01/2006     KNEE " SURGERY  1970     KNEE SURGERY Left 1970     LAPAROSCOPY  2005     POLYSOMNOGRAPHY  2018     SHOULDER SURGERY  1990     STRESS TEST      2007,2010     TONSILLECTOMY & ADENOIDECTOMY      no date    Family History   Problem Relation Age of Onset     Acute Myocardial Infarction Mother          at age: 70 years     Hypertension Mother      Heart Disease Mother      Acute Myocardial Infarction Father          at age: 52 years Cause of death: heart attack     Heart Disease Father      Myocardial Infarction Father      CABG Sister      No Known Problems Sister         age: 66 years     Ovarian Cancer Sister         +angioplaty=51years   at age: 60 years     Heart Disease Sister      Heart Disease Sister         ASHD, + Angioplasty=54 years  age: 76 years     Breast Cancer Other      Migraines Other      Multiple Sclerosis Other      Cancer No family hx of         no skin cancer    Social History     Socioeconomic History     Marital status:      Spouse name: Not on file     Number of children: Not on file     Years of education: Not on file     Highest education level: Not on file   Occupational History     Occupation: Retired    Tobacco Use     Smoking status: Former     Types: Cigarettes     Quit date: 1977     Years since quittin.6     Smokeless tobacco: Never   Vaping Use     Vaping status: Never Used   Substance and Sexual Activity     Alcohol use: Yes     Comment: 1 beer almost daily     Drug use: Not Currently     Types: Marijuana     Comment: on/off     Sexual activity: Not on file   Other Topics Concern     Parent/sibling w/ CABG, MI or angioplasty before 65F 55M? Not Asked   Social History Narrative     Not on file     Social Determinants of Health     Financial Resource Strain: Not on file   Food Insecurity: Not on file   Transportation Needs: Not on file   Physical Activity: Not on file   Stress: Not on file   Social Connections:  Not on file   Intimate Partner Violence: Not on file   Housing Stability: Not on file          Medications  Allergies   Current Outpatient Medications   Medication Sig Dispense Refill     amLODIPine (NORVASC) 5 MG tablet TAKE ONE TABLET BY MOUTH EVERY DAY 90 tablet 3     aspirin 325 MG tablet Take 1 tablet by mouth daily. 100 tablet 3     atorvastatin (LIPITOR) 80 MG tablet Take 1 tablet (80 mg) by mouth daily 30 tablet 11     baclofen (LIORESAL) 10 MG tablet Take 1 tablet by mouth 3 times daily       cholecalciferol (VITAMIN-D) 1000 UNIT capsule Take 1 capsule by mouth daily. 90 capsule 3     citalopram (CELEXA) 20 MG tablet Take 1 tablet (20 mg) by mouth daily 90 tablet 3     gabapentin (NEURONTIN) 300 MG capsule Take 2 capsules (600 mg) by mouth 3 times daily for 90 days 540 capsule 3     levothyroxine (SYNTHROID/LEVOTHROID) 112 MCG tablet Take 1 tablet (112 mcg) by mouth daily 90 tablet 3     losartan (COZAAR) 50 MG tablet Take 1.5 tablets (75 mg) by mouth daily 135 tablet 3     Multiple Vitamin (MULTIVITAMIN) per tablet Take 1 tablet by mouth daily. 100 tablet 12     tadalafil (CIALIS) 5 MG tablet Take 1-2 tabs every 48 hrs as needed for sexual activity 30 tablet 3    Allergies   Allergen Reactions     Codeine Sulfate Rash         Lab Results    Chemistry/lipid CBC Cardiac Enzymes/BNP/TSH/INR   Lab Results   Component Value Date    CHOL 155 09/21/2022    HDL 52 09/21/2022    TRIG 62 09/21/2022    BUN 23.0 09/21/2022     09/21/2022    CO2 28 09/21/2022    Lab Results   Component Value Date    WBC 6.1 09/09/2021    HGB 14.0 09/09/2021    HCT 41.7 09/09/2021    MCV 97 09/09/2021     09/09/2021    Lab Results   Component Value Date    TSH 1.09 09/21/2022

## 2023-04-05 NOTE — LETTER
"4/5/2023    Danyel Cortez MD  [address] [facility] [address]    RE: Jarrod Mancera       Dear Colleague,     I had the pleasure of seeing Jarrod Mancera in the [facility].      [facility]  [phone]          Assessment/Recommendations   Patient with known moderate coronary artery stenosis on previous angiography who is doing very well from a functional capacity standpoint.  He is exercising on a regular basis, his LDL cholesterol is below 100) he has not had an acute coronary syndrome in the past) and he takes an antiplatelet agent.  Blood pressure is well controlled.    I have commended him on his regular exercise and not made any changes in his current medical regimen.  He will call if he has any new symptoms.  He will check his blood pressures at home for the next couple of weeks and follow-up with Dr. Cortez regarding appropriateness of changing any antihypertensive medications.    We will see him back in 1 year, but of course would be happy to see him sooner if questions or problems arise.  30 minutes spent with chart review, patient visit, and documentation.     History of Present Illness/Subjective    Mr. Jarrod Mancera is a 72 year old male with known coronary artery disease with an angiogram in 2021 which showed moderate lesion 50 to 55% in LAD.  Other mild disease.  He has been feeling well this past year.  He has not had any chest discomfort, unusual shortness of breath, syncope, palpitations, orthopnea, or paroxysmal nocturnal dyspnea.  He is set up for repeat sleep study in May of this year.  He feels like his blood pressure at times in the doctor's office is elevated but today is excellent.  He takes maximal dose of atorvastatin at 80 mg a day.           Physical Examination Review of Systems   /64 (BP Location: Right arm, Patient Position: Sitting, Cuff Size: Adult Regular)   Pulse 60   Resp 16   Ht 1.727 m (5' 8\")   Wt 67.8 kg (149 lb 6.4 oz)  " " BMI 22.72 kg/m    Body mass index is 22.72 kg/m .  Wt Readings from Last 3 Encounters:   04/05/23 67.8 kg (149 lb 6.4 oz)   03/28/23 68 kg (149 lb 14.4 oz)   01/24/23 67.6 kg (149 lb)     General Appearance:   Alert, cooperative and in no acute distress.   ENT/Mouth: Patient wearing a mask.      EYES:  no scleral icterus, normal conjunctivae   Neck: JVP normal. No Hepatojugular reflux. Thyroid not visualized.   Chest/Lungs:   Lungs are clear to auscultation, equal chest wall expansion.   Cardiovascular:   S1, S2 with 1/6 systolic murmur ,clicks or rubs. Brachial, radial and posterior tibial pulses are intact and symetric. No carotid bruits noted   Abdomen:  Nontender. BS+. No bruits.   Extremities: No cyanosis, clubbing or edema   Skin: no xanthelasma, warm.    Neurologic: normal arm movement bilateral, no tremors     Psychiatric: Appropriate affect.      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;    Family history of myocardial infarction     High cholesterol     Hypertension     Hypertension     MS (multiple sclerosis) (H)     Multiple sclerosis (H)     Past Surgical History:   Procedure Laterality Date    ARTHROSCOPY SHOULDER  01/01/1990    L Shoulder    COLONOSCOPY      2/2008; 3/25/2009; 10/30/2019--normal, no further screenings needed d/t adv age 77 when due    CV CORONARY ANGIOGRAM N/A 09/09/2021    Procedure: Coronary Angiogram;  Surgeon: Placido Ang MD;  Location: [facility] CATH LAB CV    CV FRACTIONAL FLOW RATIO WIRE N/A 09/09/2021    Procedure: Fractional Flow Ratio Wire;  Surgeon: Placido Ang MD;  Location: [facility] CATH LAB CV    CV LEFT VENTRICULOGRAM N/A 09/09/2021    Procedure: Left Ventriculogram;  Surgeon: Sav, " Placido Gallegos MD;  Location: [facility] LAB CV    FLEXIBLE SIGMOIDOSCOPY  2004    HEAD & NECK SURGERY      Robotic Surgery @ [facility] 2011    HERNIA REPAIR  2006    KNEE SURGERY  1970    KNEE SURGERY Left 1970    LAPAROSCOPY  2005    POLYSOMNOGRAPHY  2018    SHOULDER SURGERY  1990    STRESS TEST      2007,2010    TONSILLECTOMY & ADENOIDECTOMY      no date    Family History   Problem Relation Age of Onset    Acute Myocardial Infarction Mother          at age: 70 years    Hypertension Mother     Heart Disease Mother     Acute Myocardial Infarction Father          at age: 52 years Cause of death: heart attack    Heart Disease Father     Myocardial Infarction Father     CABG Sister     No Known Problems Sister         age: 66 years    Ovarian Cancer Sister         +angioplaty=51years   at age: 60 years    Heart Disease Sister     Heart Disease Sister         ASHD, + Angioplasty=54 years  age: 76 years    Breast Cancer Other     Migraines Other     Multiple Sclerosis Other     Cancer No family hx of         no skin cancer    Social History     Socioeconomic History    Marital status:      Spouse name: Not on file    Number of children: Not on file    Years of education: Not on file    Highest education level: Not on file   Occupational History    Occupation: Retired    Tobacco Use    Smoking status: Former     Types: Cigarettes     Quit date: 1977     Years since quittin.6    Smokeless tobacco: Never   Vaping Use    Vaping status: Never Used   Substance and Sexual Activity    Alcohol use: Yes     Comment: 1 beer almost daily    Drug use: Not Currently     Types: Marijuana     Comment: on/off    Sexual activity: Not on file   Other Topics Concern    Parent/sibling w/ CABG, MI or angioplasty before 65F 55M? Not Asked   Social History Narrative    Not on file     Social Determinants of Health     Financial Resource Strain: Not on  file   Food Insecurity: Not on file   Transportation Needs: Not on file   Physical Activity: Not on file   Stress: Not on file   Social Connections: Not on file   Intimate Partner Violence: Not on file   Housing Stability: Not on file          Medications  Allergies   Current Outpatient Medications   Medication Sig Dispense Refill    amLODIPine (NORVASC) 5 MG tablet TAKE ONE TABLET BY MOUTH EVERY DAY 90 tablet 3    aspirin 325 MG tablet Take 1 tablet by mouth daily. 100 tablet 3    atorvastatin (LIPITOR) 80 MG tablet Take 1 tablet (80 mg) by mouth daily 30 tablet 11    baclofen (LIORESAL) 10 MG tablet Take 1 tablet by mouth 3 times daily      cholecalciferol (VITAMIN-D) 1000 UNIT capsule Take 1 capsule by mouth daily. 90 capsule 3    citalopram (CELEXA) 20 MG tablet Take 1 tablet (20 mg) by mouth daily 90 tablet 3    gabapentin (NEURONTIN) 300 MG capsule Take 2 capsules (600 mg) by mouth 3 times daily for 90 days 540 capsule 3    levothyroxine (SYNTHROID/LEVOTHROID) 112 MCG tablet Take 1 tablet (112 mcg) by mouth daily 90 tablet 3    losartan (COZAAR) 50 MG tablet Take 1.5 tablets (75 mg) by mouth daily 135 tablet 3    Multiple Vitamin (MULTIVITAMIN) per tablet Take 1 tablet by mouth daily. 100 tablet 12    tadalafil (CIALIS) 5 MG tablet Take 1-2 tabs every 48 hrs as needed for sexual activity 30 tablet 3    Allergies   Allergen Reactions    Codeine Sulfate Rash         Lab Results    Chemistry/lipid CBC Cardiac Enzymes/BNP/TSH/INR   Lab Results   Component Value Date    CHOL 155 09/21/2022    HDL 52 09/21/2022    TRIG 62 09/21/2022    BUN 23.0 09/21/2022     09/21/2022    CO2 28 09/21/2022    Lab Results   Component Value Date    WBC 6.1 09/09/2021    HGB 14.0 09/09/2021    HCT 41.7 09/09/2021    MCV 97 09/09/2021     09/09/2021    Lab Results   Component Value Date    TSH 1.09 09/21/2022                                               Thank you for allowing me to participate in the care of your  patient.      Sincerely,     Tramaine Drummond MD     [facility]  cc:   No referring provider defined for this encounter.

## 2023-04-06 ENCOUNTER — TELEPHONE (OUTPATIENT)
Dept: CARDIOLOGY | Facility: CLINIC | Age: 73
End: 2023-04-06
Payer: COMMERCIAL

## 2023-04-06 NOTE — TELEPHONE ENCOUNTER
Dr. Drummond, patient forgot to ask if he should continue Aspirin yesterday. I told him that it will likely be a life-long medication.   Do you recommend full dose or low dose? -duglas

## 2023-04-10 RX ORDER — ASPIRIN 81 MG/1
81 TABLET ORAL DAILY
COMMUNITY
End: 2024-01-19

## 2023-04-10 NOTE — TELEPHONE ENCOUNTER
----- Message -----  From: Tramaine Drummond MD  Sent: 4/7/2023   8:06 AM CDT  To: Ximena Lynch RN    Low-dose.    Tramaine Jefferson      ==  Response reviewed with patient. He verbalized understanding and will switch over to low dose aspirin.   Med list updated. -ejb

## 2023-04-13 NOTE — PROGRESS NOTES
----- Message from RODERICK Willams sent at 4/13/2023  2:47 PM CDT -----  Lipids are well controlled; BMP is normal as is magnesium  No change in medication   Jarrod is a 72 year old who is being evaluated via a billable telephone visit.      What phone number would you like to be contacted at? [phone]  How would you like to obtain your AVS? MyChart    Assessment & Plan     (U07.1) Infection due to 2019 novel coronavirus  (primary encounter diagnosis)  Comment:   Plan: nirmatrelvir and ritonavir (PAXLOVID) therapy         pack        Stop statin 10 days                   No follow-ups on file.    Petrona Milan NP  [facility]    Subjective   Jarrod is a 72 year old accompanied by his self, presenting for the following health issues:  Covid Concern (Pt had a positive covid test 11/22/22.  Pt is c/o Stuffy head and nose,cough and body aches since 11/20/22.)      HPI           Review of Systems         Objective           Vitals:  No vitals were obtained today due to virtual visit.    Physical Exam   healthy, alert and no distress  PSYCH: Alert and oriented times 3; coherent speech, normal   rate and volume, able to articulate logical thoughts, able   to abstract reason, no tangential thoughts, no hallucinations   or delusions  His affect is normal  RESP: No cough, no audible wheezing, able to talk in full sentences  Remainder of exam unable to be completed due to telephone visits                Phone call duration: 15 minutes

## 2023-05-15 ENCOUNTER — TRANSFERRED RECORDS (OUTPATIENT)
Dept: HEALTH INFORMATION MANAGEMENT | Facility: CLINIC | Age: 73
End: 2023-05-15

## 2023-06-17 NOTE — ASSESSMENT & PLAN NOTE
- 8/21/2018 HST showed moderate obstructive sleep apnea with AHI 24 and oximetry maxine 80%.  Central apneas noted at 8.9/h with 23% Cheyne-Metz respiration  -9/19/2018 optimal CPAP titration to 6 cm of water pressure  -12/13/2018 CPAP discontinued as it was ineffective, uncomfortable, and alliance reports indicated AHI is varying from 0-76 on therapy    =Polysomnogram 5/15/23  Respiratory events: Patient was found to have a total of 76 hypopneas and 90 central apneas, patient was also found to have 28 obstructive apneas this overall led to an apnea-hypopnea index of 37 events per hour consistent with a case of primarily obstructive severe sleep apnea, however patient has a generous central component as well.    Oxygen saturation: Lowest recorded oxygen saturation was at 79% with patient spending 30 minutes with sat less than 89% consistent with moderate desaturations.    Severe symptomatic sleep apnea predominant obstructive but with significant point of central apneas likely due to his neurologic disease.

## 2023-06-20 ENCOUNTER — TELEPHONE (OUTPATIENT)
Dept: FAMILY MEDICINE | Facility: CLINIC | Age: 73
End: 2023-06-20

## 2023-06-20 ENCOUNTER — OFFICE VISIT (OUTPATIENT)
Dept: FAMILY MEDICINE | Facility: CLINIC | Age: 73
End: 2023-06-20
Payer: COMMERCIAL

## 2023-06-20 VITALS
HEIGHT: 68 IN | OXYGEN SATURATION: 98 % | WEIGHT: 146.2 LBS | HEART RATE: 54 BPM | DIASTOLIC BLOOD PRESSURE: 60 MMHG | SYSTOLIC BLOOD PRESSURE: 122 MMHG | TEMPERATURE: 98 F | BODY MASS INDEX: 22.16 KG/M2

## 2023-06-20 DIAGNOSIS — I25.119 CORONARY ARTERY DISEASE INVOLVING NATIVE CORONARY ARTERY OF NATIVE HEART WITH ANGINA PECTORIS (H): ICD-10-CM

## 2023-06-20 DIAGNOSIS — Z85.21 HISTORY OF LARYNGEAL CANCER: ICD-10-CM

## 2023-06-20 DIAGNOSIS — G35 MULTIPLE SCLEROSIS (H): ICD-10-CM

## 2023-06-20 DIAGNOSIS — G47.33 OSA (OBSTRUCTIVE SLEEP APNEA): Primary | ICD-10-CM

## 2023-06-20 PROCEDURE — 99214 OFFICE O/P EST MOD 30 MIN: CPT | Performed by: INTERNAL MEDICINE

## 2023-06-20 ASSESSMENT — ENCOUNTER SYMPTOMS
PALPITATIONS: 0
UNEXPECTED WEIGHT CHANGE: 0
SLEEP DISTURBANCE: 0
HEARTBURN: 0
SHORTNESS OF BREATH: 0
FATIGUE: 1
HEADACHES: 0
COUGH: 0

## 2023-06-20 NOTE — ASSESSMENT & PLAN NOTE
Angiogram 9/9/21    Mid LAD lesion is 55% stenosed.    Pressure wire/FFR was performed on the lesion. FFR: 0.81.    The left ventricular ejection fraction is grossly normal.    Ost LM to Mid LM lesion is 15% stenosed.    Ost LAD to Prox LAD lesion is 40% stenosed.    RPDA lesion is 45% stenosed.  No angina or CHF

## 2023-06-20 NOTE — PROGRESS NOTES
Assessment & Plan   Problem List Items Addressed This Visit        Nervous and Auditory    Multiple sclerosis (H)     relapsing/remitting - follows with neurology   - on glatiramer    - takes baclofen for spasticity - well controlled         Relevant Orders    Comprehensive Sleep Study    Coronary artery disease involving native coronary artery of native heart with angina pectoris (H)     Angiogram 9/9/21    Mid LAD lesion is 55% stenosed.    Pressure wire/FFR was performed on the lesion. FFR: 0.81.    The left ventricular ejection fraction is grossly normal.    Ost LM to Mid LM lesion is 15% stenosed.    Ost LAD to Prox LAD lesion is 40% stenosed.    RPDA lesion is 45% stenosed.  No angina or CHF            Respiratory    ELODIA (obstructive sleep apnea) - Primary     - 8/21/2018 HST showed moderate obstructive sleep apnea with AHI 24 and oximetry maxine 80%.  Central apneas noted at 8.9/h with 23% Cheyne-Metz respiration  -9/19/2018 optimal CPAP titration to 6 cm of water pressure  -12/13/2018 CPAP discontinued as it was ineffective, uncomfortable, and alliance reports indicated AHI is varying from 0-76 on therapy    =Polysomnogram 5/15/23  Respiratory events: Patient was found to have a total of 76 hypopneas and 90 central apneas, patient was also found to have 28 obstructive apneas this overall led to an apnea-hypopnea index of 37 events per hour consistent with a case of primarily obstructive severe sleep apnea, however patient has a generous central component as well.    Oxygen saturation: Lowest recorded oxygen saturation was at 79% with patient spending 30 minutes with sat less than 89% consistent with moderate desaturations.    Severe symptomatic sleep apnea predominant obstructive but with significant point of central apneas likely due to his neurologic disease.           Relevant Orders    Comprehensive Sleep Study    History of laryngeal cancer     resected in '11 - follows through [facility] remotely    "- HPV related   - no further surveillance imaging                      Follow-up after CPAP titration    Mohsen Goldman MD  [facility]    Mejia Garay is a 73 year old, presenting for the following health issues: patient is here for follow up after sleep study that was done last month in [place] (05/15/2023) ?  Sleep Study        6/20/2023     9:17 AM   Additional Questions   Roomed by lon denis   Accompanied by self     History of Present Illness       Reason for visit:  Sleep Apnea    He eats 2-3 servings of fruits and vegetables daily.He consumes 0 sweetened beverage(s) daily.He exercises with enough effort to increase his heart rate 20 to 29 minutes per day.  He exercises with enough effort to increase his heart rate 6 days per week.   He is taking medications regularly.             Review of Systems   Constitutional: Positive for fatigue. Negative for unexpected weight change.   Respiratory: Negative for cough and shortness of breath.    Cardiovascular: Negative for chest pain, palpitations and peripheral edema.   Gastrointestinal: Negative for heartburn.   Genitourinary:        Nocturia 1-2   Neurological: Negative for headaches.   Psychiatric/Behavioral: Negative for sleep disturbance.   Patient is here for follow-up of an in lab sleep test.  We reviewed the results and touched upon treatment options.  Given the mix of central and obstructive events think CPAP is the only treatment option.  No angina or dyspnea.  He does say that he felt better when he was on CPAP in the past.  He was  3 years ago.  He is noted to have apneas and snoring.  Significant daytime sleepiness.        Objective    /60 (BP Location: Right arm, Patient Position: Sitting)   Pulse 54   Temp 98  F (36.7  C)   Ht 1.727 m (5' 8\")   Wt 66.3 kg (146 lb 3.2 oz)   SpO2 98%   BMI 22.23 kg/m    Body mass index is 22.23 kg/m .  Physical Exam   The appearing man in no distress.  Alert and " oriented.  In good spirits.  HEENT exam is unremarkable.  Mallampati 1.  Neck is not thick.  Cranial nerves normal.  No edema.

## 2023-06-20 NOTE — TELEPHONE ENCOUNTER
Faxed order for comprehensive sleep study to [facility] at [phone]. Confirmation was received per SHERIE Black.

## 2023-06-20 NOTE — ASSESSMENT & PLAN NOTE
resected in '11 - follows through [facility] remotely   - HPV related   - no further surveillance imaging

## 2023-06-21 ENCOUNTER — TELEPHONE (OUTPATIENT)
Dept: FAMILY MEDICINE | Facility: CLINIC | Age: 73
End: 2023-06-21
Payer: COMMERCIAL

## 2023-06-22 NOTE — TELEPHONE ENCOUNTER
Pt states he is waiting for [facility] to call him to schedule his sleep study. Pt has no further questions or concerns.

## 2023-07-10 ENCOUNTER — NURSE TRIAGE (OUTPATIENT)
Dept: NURSING | Facility: CLINIC | Age: 73
End: 2023-07-10
Payer: COMMERCIAL

## 2023-07-10 NOTE — TELEPHONE ENCOUNTER
3 weeks of low back pain   Nurse Triage SBAR    Is this a 2nd Level Triage? NO    Situation:   Low back pain    Background:   Low back pain after weed wacking. History of same. Today lower left back pain that makes him compensate by walking sideways, improved by rubbing the offending area.   Assessment:   Low left back pain, does not radiate, no abdominal pain, is not severe no sensation changes  Protocol Recommended Disposition:   See in Office Within 3 Days    Recommendation:   Follow up with PCP within 3 days.   Patient declined scheduling appointment today.   Home care  such as heat and OTC pain medication, rest but continue ADL.          Does the patient meet one of the following criteria for ADS visit consideration? 16+ years old, with an [facility] PCP     TIP  Providers, please consider if this condition is appropriate for management at one of our Acute and Diagnostic Services sites.     If patient is a good candidate, please use dotphrase <dot>triageresponse and select Refer to ADS to document.  Reason for Disposition    Patient wants to be seen    Additional Information    Negative: Passed out (i.e., fainted, collapsed and was not responding)    Negative: Shock suspected (e.g., cold/pale/clammy skin, too weak to stand, low BP, rapid pulse)    Negative: Sounds like a life-threatening emergency to the triager    Negative: SEVERE back pain of sudden onset and age > 60 years    Negative: SEVERE abdominal pain (e.g., excruciating)    Negative: Abdominal pain and age > 60 years    Negative: Unable to urinate (or only a few drops) and bladder feels very full    Negative: Loss of bladder or bowel control (urine or bowel incontinence; wetting self, leaking stool) of new-onset    Negative: Numbness (loss of sensation) in groin or rectal area    Negative: Pain radiates into groin, scrotum    Negative: Blood in urine (red, pink, or tea-colored)    Negative: Vomiting and pain over lower ribs of back (i.e., flank - kidney  area)    Negative: Weakness of a leg or foot (e.g., unable to bear weight, dragging foot)    Negative: Patient sounds very sick or weak to the triager    Negative: Fever > 100.4 F (38.0 C) and flank pain    Negative: Pain or burning with passing urine (urination)    Negative: SEVERE back pain (e.g., excruciating, unable to do any normal activities) and not improved after pain medicine and CARE ADVICE    Negative: Numbness in an arm or hand (i.e., loss of sensation) and upper back pain    Negative: Numbness in a leg or foot (i.e., loss of sensation)    Negative: High-risk adult (e.g., history of cancer, history of HIV, or history of IV Drug Use)    Negative: Soft tissue infection (e.g., abscess, cellulitis) or other serious infection (e.g., bacteremia) in last 2 weeks    Negative: Painful rash with multiple small blisters grouped together (i.e., dermatomal distribution or 'band' or 'stripe')    Negative: Pain radiates into the thigh or further down the leg, and in both legs    Negative: Age > 50 and no history of prior similar back pain    Negative: MODERATE back pain (e.g., interferes with normal activities) and present > 3 days    Negative: Pain radiates into the thigh or further down the leg    Protocols used: BACK PAIN-A-OH

## 2023-08-31 ENCOUNTER — OFFICE VISIT (OUTPATIENT)
Dept: FAMILY MEDICINE | Facility: CLINIC | Age: 73
End: 2023-08-31
Payer: COMMERCIAL

## 2023-08-31 ENCOUNTER — TELEPHONE (OUTPATIENT)
Dept: FAMILY MEDICINE | Facility: CLINIC | Age: 73
End: 2023-08-31

## 2023-08-31 ENCOUNTER — ANCILLARY PROCEDURE (OUTPATIENT)
Dept: GENERAL RADIOLOGY | Facility: CLINIC | Age: 73
End: 2023-08-31
Attending: INTERNAL MEDICINE
Payer: COMMERCIAL

## 2023-08-31 VITALS
HEIGHT: 68 IN | RESPIRATION RATE: 16 BRPM | HEART RATE: 63 BPM | SYSTOLIC BLOOD PRESSURE: 164 MMHG | OXYGEN SATURATION: 99 % | TEMPERATURE: 97.7 F | DIASTOLIC BLOOD PRESSURE: 83 MMHG | BODY MASS INDEX: 22.23 KG/M2

## 2023-08-31 DIAGNOSIS — M53.3 SACROILIAC JOINT PAIN: ICD-10-CM

## 2023-08-31 DIAGNOSIS — M25.552 HIP PAIN, LEFT: ICD-10-CM

## 2023-08-31 DIAGNOSIS — G35 MULTIPLE SCLEROSIS (H): ICD-10-CM

## 2023-08-31 DIAGNOSIS — C09.9 SQUAMOUS CELL CARCINOMA OF TONSIL (H): ICD-10-CM

## 2023-08-31 DIAGNOSIS — E78.00 PURE HYPERCHOLESTEROLEMIA: ICD-10-CM

## 2023-08-31 DIAGNOSIS — M25.552 HIP PAIN, LEFT: Primary | ICD-10-CM

## 2023-08-31 DIAGNOSIS — N52.9 ERECTILE DYSFUNCTION, UNSPECIFIED ERECTILE DYSFUNCTION TYPE: ICD-10-CM

## 2023-08-31 PROCEDURE — 73502 X-RAY EXAM HIP UNI 2-3 VIEWS: CPT | Mod: TC | Performed by: RADIOLOGY

## 2023-08-31 PROCEDURE — 99213 OFFICE O/P EST LOW 20 MIN: CPT | Performed by: INTERNAL MEDICINE

## 2023-08-31 PROCEDURE — 72202 X-RAY EXAM SI JOINTS 3/> VWS: CPT | Mod: TC | Performed by: RADIOLOGY

## 2023-08-31 RX ORDER — TADALAFIL 5 MG/1
TABLET ORAL
Qty: 30 TABLET | Refills: 3 | Status: SHIPPED | OUTPATIENT
Start: 2023-08-31 | End: 2023-10-18

## 2023-08-31 RX ORDER — GABAPENTIN 300 MG/1
600 CAPSULE ORAL 3 TIMES DAILY
Qty: 540 CAPSULE | Refills: 3 | Status: SHIPPED | OUTPATIENT
Start: 2023-08-31 | End: 2024-01-19

## 2023-08-31 RX ORDER — CITALOPRAM HYDROBROMIDE 20 MG/1
20 TABLET ORAL DAILY
Qty: 90 TABLET | Refills: 3 | Status: SHIPPED | OUTPATIENT
Start: 2023-08-31 | End: 2023-10-18

## 2023-08-31 RX ORDER — LOSARTAN POTASSIUM 50 MG/1
75 TABLET ORAL DAILY
Qty: 135 TABLET | Refills: 3 | Status: SHIPPED | OUTPATIENT
Start: 2023-08-31 | End: 2024-01-19

## 2023-08-31 ASSESSMENT — PATIENT HEALTH QUESTIONNAIRE - PHQ9
10. IF YOU CHECKED OFF ANY PROBLEMS, HOW DIFFICULT HAVE THESE PROBLEMS MADE IT FOR YOU TO DO YOUR WORK, TAKE CARE OF THINGS AT HOME, OR GET ALONG WITH OTHER PEOPLE: NOT DIFFICULT AT ALL
SUM OF ALL RESPONSES TO PHQ QUESTIONS 1-9: 2
SUM OF ALL RESPONSES TO PHQ QUESTIONS 1-9: 2

## 2023-08-31 NOTE — PROGRESS NOTES
"  Assessment & Plan         Sacroiliac joint pain  Subacute timeframe of persistent focal left-sided posterior lower pelvic sacral back pain -seems to be more along SI joint in origin  -X-ray of left hip appearing unremarkable per my read  Arranging for x-ray of his SI joints and pelvis  Referring to physical therapy for further evaluation  Recommending Voltaren cream  -If pain is persisting, would consider referral to orthospine for further investigation    - Physical Therapy Referral; Future  - XR Pelvis 1/2 Views; Future               Danyel Cortez MD  [facility]    Mejia Garay is a 73 year old, presenting for the following health issues: Presents with several month history of nagging posterior left-sided hip pain.  State does not interfere with his golfing or other activity.  Usually notices it more after sitting for a while and standing up.  The night prior felt as significant exacerbation of the pain and felt like there was a ripping like sensation in his lower back gluteal region.  Musculoskeletal Problem (Left hip- shooting pain)        8/31/2023     3:07 PM   Additional Questions   Roomed by Maria Luisa CARRION       Musculoskeletal Problem    History of Present Illness       Reason for visit:  Sore left hip    He eats 2-3 servings of fruits and vegetables daily.He consumes 0 sweetened beverage(s) daily.He exercises with enough effort to increase his heart rate 30 to 60 minutes per day.  He exercises with enough effort to increase his heart rate 6 days per week.   He is taking medications regularly.           Review of Systems   Constitutional, HEENT, cardiovascular, pulmonary, gi and gu systems are negative, except as otherwise noted.      Objective    BP (!) 157/75 (BP Location: Right arm, Patient Position: Sitting, Cuff Size: Adult Regular)   Pulse 63   Temp 97.7  F (36.5  C) (Oral)   Resp 16   Ht 1.727 m (5' 7.99\")   SpO2 99%   BMI 22.23 kg/m    Body mass index is 22.23 " kg/m .  Physical Exam   Normal external/internal rotation of hip bilaterally.  Negative straight leg raise bilaterally.  Reproducible pain with palpation over left-sided SI joint.  No provoked pain with flexion extension of the lumbar spine  Left trochanteric bursa nontender to palpation

## 2023-08-31 NOTE — PATIENT INSTRUCTIONS
Referring to physical therapy    Consider topical use of Voltaren (diclofenac cream) which can be bought over-the-counter and to be applied locally to the area

## 2023-08-31 NOTE — TELEPHONE ENCOUNTER
Per Dr Cortez, he can see patient today or tomorrow. Patient is scheduled for today with an xray on left hip prior to appointment. Xray order pending for signature.

## 2023-08-31 NOTE — TELEPHONE ENCOUNTER
Reason for Call:  Appointment Request    Patient requesting this type of appt:  hip problem    Requested provider: Danyel Cortez    Reason patient unable to be scheduled: Not with their preferred provider    When does patient want to be seen/preferred time: Same day    Comments: hip has been hurting for months but last night it felt like something was torn    Could we send this information to you in [facility] or would you prefer to receive a phone call?:   Patient would prefer a phone call   Okay to leave a detailed message?: Yes at Home number on file [phone] (home)    Call taken on 8/31/2023 at 7:45 AM by Maribel Dodd

## 2023-09-19 DIAGNOSIS — I10 BENIGN ESSENTIAL HYPERTENSION: ICD-10-CM

## 2023-09-20 ENCOUNTER — LAB (OUTPATIENT)
Dept: LAB | Facility: CLINIC | Age: 73
End: 2023-09-20
Payer: COMMERCIAL

## 2023-09-20 DIAGNOSIS — I25.119 CORONARY ARTERY DISEASE INVOLVING NATIVE CORONARY ARTERY OF NATIVE HEART WITH ANGINA PECTORIS (H): ICD-10-CM

## 2023-09-20 DIAGNOSIS — I10 ESSENTIAL HYPERTENSION: ICD-10-CM

## 2023-09-20 LAB
ANION GAP SERPL CALCULATED.3IONS-SCNC: 11 MMOL/L (ref 7–15)
BUN SERPL-MCNC: 29.3 MG/DL (ref 8–23)
CALCIUM SERPL-MCNC: 9.6 MG/DL (ref 8.8–10.2)
CHLORIDE SERPL-SCNC: 102 MMOL/L (ref 98–107)
CHOLEST SERPL-MCNC: 164 MG/DL
CREAT SERPL-MCNC: 1.14 MG/DL (ref 0.67–1.17)
DEPRECATED HCO3 PLAS-SCNC: 26 MMOL/L (ref 22–29)
EGFRCR SERPLBLD CKD-EPI 2021: 69 ML/MIN/1.73M2
GLUCOSE SERPL-MCNC: 88 MG/DL (ref 70–99)
HDLC SERPL-MCNC: 51 MG/DL
LDLC SERPL CALC-MCNC: 97 MG/DL
NONHDLC SERPL-MCNC: 113 MG/DL
POTASSIUM SERPL-SCNC: 4.6 MMOL/L (ref 3.4–5.3)
SODIUM SERPL-SCNC: 139 MMOL/L (ref 136–145)
TRIGL SERPL-MCNC: 81 MG/DL
TSH SERPL DL<=0.005 MIU/L-ACNC: 3.73 UIU/ML (ref 0.3–4.2)

## 2023-09-20 PROCEDURE — 84443 ASSAY THYROID STIM HORMONE: CPT

## 2023-09-20 PROCEDURE — 36415 COLL VENOUS BLD VENIPUNCTURE: CPT

## 2023-09-20 PROCEDURE — 80048 BASIC METABOLIC PNL TOTAL CA: CPT

## 2023-09-20 PROCEDURE — 80061 LIPID PANEL: CPT

## 2023-09-20 RX ORDER — AMLODIPINE BESYLATE 5 MG/1
TABLET ORAL
Qty: 90 TABLET | Refills: 1 | Status: SHIPPED | OUTPATIENT
Start: 2023-09-20 | End: 2024-01-19

## 2023-09-26 ENCOUNTER — OFFICE VISIT (OUTPATIENT)
Dept: FAMILY MEDICINE | Facility: CLINIC | Age: 73
End: 2023-09-26
Payer: COMMERCIAL

## 2023-09-26 VITALS
DIASTOLIC BLOOD PRESSURE: 82 MMHG | BODY MASS INDEX: 23.49 KG/M2 | TEMPERATURE: 97.4 F | HEART RATE: 60 BPM | RESPIRATION RATE: 20 BRPM | HEIGHT: 68 IN | OXYGEN SATURATION: 99 % | SYSTOLIC BLOOD PRESSURE: 168 MMHG | WEIGHT: 155 LBS

## 2023-09-26 DIAGNOSIS — I25.119 CORONARY ARTERY DISEASE INVOLVING NATIVE CORONARY ARTERY OF NATIVE HEART WITH ANGINA PECTORIS (H): ICD-10-CM

## 2023-09-26 DIAGNOSIS — G35 MULTIPLE SCLEROSIS (H): ICD-10-CM

## 2023-09-26 DIAGNOSIS — G47.39 MIXED SLEEP APNEA: Primary | ICD-10-CM

## 2023-09-26 PROCEDURE — 90662 IIV NO PRSV INCREASED AG IM: CPT | Performed by: INTERNAL MEDICINE

## 2023-09-26 PROCEDURE — 99213 OFFICE O/P EST LOW 20 MIN: CPT | Mod: 25 | Performed by: INTERNAL MEDICINE

## 2023-09-26 PROCEDURE — G0008 ADMIN INFLUENZA VIRUS VAC: HCPCS | Performed by: INTERNAL MEDICINE

## 2023-09-26 ASSESSMENT — ENCOUNTER SYMPTOMS
COUGH: 0
PALPITATIONS: 0
FATIGUE: 0
HEADACHES: 0
SHORTNESS OF BREATH: 0
SLEEP DISTURBANCE: 0
HEARTBURN: 0
UNEXPECTED WEIGHT CHANGE: 0

## 2023-09-26 NOTE — ASSESSMENT & PLAN NOTE
- 8/21/2018 HST showed moderate obstructive sleep apnea with AHI 24 and oximetry maxine 80%.  Central apneas noted at 8.9/h with 23% Cheyne-Metz respiration  -9/19/2018 optimal CPAP titration to 6 cm of water pressure  -12/13/2018 CPAP discontinued as it was ineffective, uncomfortable, and [facility] reports indicated AHI is varying from 0-76 on therapy    -Polysomnogram 5/15/23  Respiratory events: Patient was found to have a total of 76 hypopneas and 90 central apneas, patient was also found to have 28 obstructive apneas this overall led to an apnea-hypopnea index of 37 events per hour consistent with a case of primarily obstructive severe sleep apnea, however patient has a generous central component as well.    Oxygen saturation: Lowest recorded oxygen saturation was at 79% with patient spending 30 minutes with sat less than 89% consistent with moderate desaturations.    Severe symptomatic sleep apnea predominant obstructive but with significant point of central apneas likely due to his neurologic disease.    - 9/13/2023 CPAP titration to 7 cm of water pressure

## 2023-09-26 NOTE — PROGRESS NOTES
Assessment & Plan   Problem List Items Addressed This Visit       Multiple sclerosis (H)     relapsing/remitting - follows with neurology   - on glatiramer    - takes baclofen for spasticity - well controlled         Coronary artery disease involving native coronary artery of native heart with angina pectoris (H)     Angiogram 9/9/21  Mid LAD lesion is 55% stenosed.  Pressure wire/FFR was performed on the lesion. FFR: 0.81.  The left ventricular ejection fraction is grossly normal.  Ost LM to Mid LM lesion is 15% stenosed.  Ost LAD to Prox LAD lesion is 40% stenosed.  RPDA lesion is 45% stenosed.  No angina or CHF         Mixed sleep apnea - Primary     - 8/21/2018 HST showed moderate obstructive sleep apnea with AHI 24 and oximetry maxine 80%.  Central apneas noted at 8.9/h with 23% Cheyne-Metz respiration  -9/19/2018 optimal CPAP titration to 6 cm of water pressure  -12/13/2018 CPAP discontinued as it was ineffective, uncomfortable, and Complinace reports indicated AHI is varying from 0-76 on therapy    -Polysomnogram 5/15/23  Respiratory events: Patient was found to have a total of 76 hypopneas and 90 central apneas, patient was also found to have 28 obstructive apneas this overall led to an apnea-hypopnea index of 37 events per hour consistent with a case of primarily obstructive severe sleep apnea, however patient has a generous central component as well.    Oxygen saturation: Lowest recorded oxygen saturation was at 79% with patient spending 30 minutes with sat less than 89% consistent with moderate desaturations.    Severe symptomatic sleep apnea predominant obstructive but with significant point of central apneas likely due to his neurologic disease.    - 9/13/2023 CPAP titration to 7 cm of water pressure         Relevant Orders    CPAP Order for DME - ONLY FOR DME (Completed)                 CPAP follow-up in 3 months    Mohsen Goldman MD  [facility]    Mjeia Garay is  "a 73 year old, presenting for the following health issues:  Sleep Apnea (Pt here for Follow-up on his Sleep study from 9/11/23 at [facility])        8/31/2023     3:07 PM   Additional Questions   Roomed by Maria Luisa CARRION       History of Present Illness       Reason for visit:  Sleep Apnea    He eats 2-3 servings of fruits and vegetables daily.He consumes 0 sweetened beverage(s) daily.He exercises with enough effort to increase his heart rate 30 to 60 minutes per day.  He exercises with enough effort to increase his heart rate 6 days per week.   He is taking medications regularly.     Patient here for follow-up of CPAP titration done because of severe sleep apnea which is predominantly central.  He has no complaints today is tired at times.  His biggest problem with sleep is that he is cannot often wakes him up early.  He does not snore if he is not on his back.              Review of Systems   Constitutional:  Negative for fatigue and unexpected weight change.   Respiratory:  Negative for cough and shortness of breath.    Cardiovascular:  Negative for chest pain, palpitations and peripheral edema.   Gastrointestinal:  Negative for heartburn.   Genitourinary:         No nocturia   Neurological:  Negative for headaches.   Psychiatric/Behavioral:  Negative for sleep disturbance.             Objective    BP (!) 168/82 (BP Location: Right arm, Patient Position: Sitting, Cuff Size: Adult Regular)   Pulse 60   Temp 97.4  F (36.3  C) (Tympanic)   Resp 20   Ht 1.727 m (5' 7.99\")   Wt 70.3 kg (155 lb)   SpO2 99%   BMI 23.57 kg/m    Body mass index is 23.57 kg/m .  Physical Exam   The appearing man in no distress  Alert and oriented, no facial asymmetry, EOMI  HEENT exam reveals a crowded oropharynx, Mallampati 2  No edema  Normal mood and affect                      "

## 2023-10-18 ENCOUNTER — OFFICE VISIT (OUTPATIENT)
Dept: FAMILY MEDICINE | Facility: CLINIC | Age: 73
End: 2023-10-18
Payer: COMMERCIAL

## 2023-10-18 VITALS
RESPIRATION RATE: 16 BRPM | HEART RATE: 56 BPM | OXYGEN SATURATION: 99 % | DIASTOLIC BLOOD PRESSURE: 83 MMHG | SYSTOLIC BLOOD PRESSURE: 156 MMHG | TEMPERATURE: 97.9 F | BODY MASS INDEX: 24.48 KG/M2 | WEIGHT: 156 LBS | HEIGHT: 67 IN

## 2023-10-18 DIAGNOSIS — E78.00 PURE HYPERCHOLESTEROLEMIA: ICD-10-CM

## 2023-10-18 DIAGNOSIS — I10 ESSENTIAL HYPERTENSION: ICD-10-CM

## 2023-10-18 DIAGNOSIS — E89.0 POSTOPERATIVE HYPOTHYROIDISM: ICD-10-CM

## 2023-10-18 DIAGNOSIS — G35 MULTIPLE SCLEROSIS (H): ICD-10-CM

## 2023-10-18 DIAGNOSIS — E06.3 HYPOTHYROIDISM DUE TO HASHIMOTO'S THYROIDITIS: ICD-10-CM

## 2023-10-18 DIAGNOSIS — C09.9 SQUAMOUS CELL CARCINOMA OF TONSIL (H): ICD-10-CM

## 2023-10-18 DIAGNOSIS — Z00.00 ENCOUNTER FOR MEDICARE ANNUAL WELLNESS EXAM: Primary | ICD-10-CM

## 2023-10-18 DIAGNOSIS — F41.1 GENERALIZED ANXIETY DISORDER: ICD-10-CM

## 2023-10-18 DIAGNOSIS — G47.39 MIXED SLEEP APNEA: ICD-10-CM

## 2023-10-18 DIAGNOSIS — N52.9 ERECTILE DYSFUNCTION, UNSPECIFIED ERECTILE DYSFUNCTION TYPE: ICD-10-CM

## 2023-10-18 DIAGNOSIS — Z00.00 HEALTH CARE MAINTENANCE: ICD-10-CM

## 2023-10-18 PROCEDURE — 99213 OFFICE O/P EST LOW 20 MIN: CPT | Mod: 25 | Performed by: INTERNAL MEDICINE

## 2023-10-18 PROCEDURE — G0439 PPPS, SUBSEQ VISIT: HCPCS | Performed by: INTERNAL MEDICINE

## 2023-10-18 RX ORDER — LEVOTHYROXINE SODIUM 112 UG/1
112 TABLET ORAL DAILY
Qty: 90 TABLET | Refills: 3 | Status: SHIPPED | OUTPATIENT
Start: 2023-10-18 | End: 2024-01-19

## 2023-10-18 RX ORDER — RESPIRATORY SYNCYTIAL VIRUS VACCINE 120MCG/0.5
0.5 KIT INTRAMUSCULAR ONCE
Qty: 1 EACH | Refills: 0 | Status: CANCELLED | OUTPATIENT
Start: 2023-10-18 | End: 2023-10-18

## 2023-10-18 RX ORDER — CITALOPRAM HYDROBROMIDE 10 MG/1
10 TABLET ORAL DAILY
Qty: 90 TABLET | Refills: 1 | Status: SHIPPED | OUTPATIENT
Start: 2023-10-18 | End: 2024-01-19

## 2023-10-18 RX ORDER — CITALOPRAM HYDROBROMIDE 20 MG/1
20 TABLET ORAL DAILY
Qty: 90 TABLET | Refills: 3 | Status: SHIPPED | OUTPATIENT
Start: 2023-10-18 | End: 2023-10-18

## 2023-10-18 RX ORDER — TADALAFIL 5 MG/1
TABLET ORAL
Qty: 30 TABLET | Refills: 3 | Status: SHIPPED | OUTPATIENT
Start: 2023-10-18

## 2023-10-18 ASSESSMENT — ENCOUNTER SYMPTOMS
HEMATURIA: 0
JOINT SWELLING: 0
PARESTHESIAS: 0
ABDOMINAL PAIN: 0
HEADACHES: 0
ARTHRALGIAS: 0
EYE PAIN: 0
FEVER: 0
DIARRHEA: 0
MYALGIAS: 0
FREQUENCY: 0
WEAKNESS: 0
SORE THROAT: 0
PALPITATIONS: 0
NERVOUS/ANXIOUS: 0
HEARTBURN: 0
SHORTNESS OF BREATH: 0
HEMATOCHEZIA: 0
CONSTIPATION: 0
COUGH: 0
DYSURIA: 0
DIZZINESS: 0
NAUSEA: 0
CHILLS: 0

## 2023-10-18 ASSESSMENT — ACTIVITIES OF DAILY LIVING (ADL): CURRENT_FUNCTION: NO ASSISTANCE NEEDED

## 2023-10-18 ASSESSMENT — PATIENT HEALTH QUESTIONNAIRE - PHQ9
SUM OF ALL RESPONSES TO PHQ QUESTIONS 1-9: 0
SUM OF ALL RESPONSES TO PHQ QUESTIONS 1-9: 0
10. IF YOU CHECKED OFF ANY PROBLEMS, HOW DIFFICULT HAVE THESE PROBLEMS MADE IT FOR YOU TO DO YOUR WORK, TAKE CARE OF THINGS AT HOME, OR GET ALONG WITH OTHER PEOPLE: NOT DIFFICULT AT ALL

## 2023-10-18 NOTE — ASSESSMENT & PLAN NOTE
current antihypertensive regimen: losartan 50mg daily, amlodipine 5mg daily  regimen changes:   intolerance:   future titration/work-up plan:

## 2023-10-18 NOTE — PATIENT INSTRUCTIONS
Patient Education   Personalized Prevention Plan  You are due for the preventive services outlined below.  Your care team is available to assist you in scheduling these services.  If you have already completed any of these items, please share that information with your care team to update in your medical record.  Health Maintenance Due   Topic Date Due     Depression Action Plan  Never done     RSV VACCINE 60+ (1 - 1-dose 60+ series) Never done     AORTIC ANEURYSM SCREENING (SYSTEM ASSIGNED)  Never done     COVID-19 Vaccine (5 - 2023-24 season) 09/01/2023     Annual Wellness Visit  09/28/2023

## 2023-10-18 NOTE — ASSESSMENT & PLAN NOTE
some endorsed sleep apneic features (witnessed pauses, heavy snoring)   - h/o laryngeal resection   - sleep study ('18) demonstrating moderate sleep apnea - opting not to pursue CPAP at this time  '23: Repeat sleep study demonstrating mixed obstructive and central sleep apnea features with significant AHI  -Plan for home CPAP trial

## 2023-10-18 NOTE — ASSESSMENT & PLAN NOTE
'23: Would like to try to wean off of citalopram; reduced from 20 mg to 10 mg daily for planned 3 months and then to taper off completely over the following 3 months

## 2023-10-18 NOTE — ASSESSMENT & PLAN NOTE
- CRC due '29   - continue annual PSA testing   - completed Shingrix   - expected grieving with passing of wife in summer, 2020 (lung cancer)   -Vaccinations discussed and updated accordingly   - cialis prn

## 2023-10-18 NOTE — PROGRESS NOTES
"SUBJECTIVE:   Jarrod is a 73 year old who presents for Preventive Visit.  Returns for general follow-up and preventative exam.  Overall doing well.  Has started seeing another woman which would be his first longer-term relationship since Sara's passing.  Generally doing well.  Still with some persistent SI pain but significantly better than from before.  Working with Dr. Reilly related to mixed sleep apnea planning on starting at home CPAP in near future.  States girlfriend has noticed some degree of tremor she is observed at night.  He has never noticed any tremor himself      10/18/2023    11:18 AM   Additional Questions   Roomed by mone   Accompanied by n/a       Are you in the first 12 months of your Medicare coverage?  No    Healthy Habits:     In general, how would you rate your overall health?  Good    Frequency of exercise:  6-7 days/week    Duration of exercise:  30-45 minutes    Do you usually eat at least 4 servings of fruit and vegetables a day, include whole grains    & fiber and avoid regularly eating high fat or \"junk\" foods?  Yes    Taking medications regularly:  Yes    Barriers to taking medications:  None    Medication side effects:  None    Ability to successfully perform activities of daily living:  No assistance needed    Home Safety:  No safety concerns identified    Hearing Impairment:  No hearing concerns    In the past 6 months, have you been bothered by leaking of urine?  No    In general, how would you rate your overall mental or emotional health?  Good    Additional concerns today:  Yes      Today's PHQ-9 Score:       10/18/2023    10:00 AM   PHQ-9 SCORE   PHQ-9 Total Score MyChart 0   PHQ-9 Total Score 0           Have you ever done Advance Care Planning? (For example, a Health Directive, POLST, or a discussion with a medical provider or your loved ones about your wishes): Yes, advance care planning is on file.      Right Ear:      1000 Hz RESPONSE- on Level: 40 db (Conditioning sound)   " 1000 Hz: RESPONSE- on Level: 40 db   2000 Hz: RESPONSE- on Level: 40 db   4000 Hz: RESPONSE- on Level: 40 db    Left Ear:      4000 Hz: RESPONSE- on Level: tone not heard   2000 Hz: RESPONSE- on Level: 40 db   1000 Hz: RESPONSE- on Level: 40 db    500 Hz: RESPONSE- on Level: 40 db    Right Ear:    500 Hz: RESPONSE- on Level: 40 db    Hearing Acuity: Pass    Hearing Assessment: normal   Fall risk  Fallen 2 or more times in the past year?: No  Any fall with injury in the past year?: No    Cognitive Screening normal cognition    Do you have sleep apnea, excessive snoring or daytime drowsiness? : yes    Reviewed and updated as needed this visit by clinical staff   Tobacco  Allergies  Meds              Reviewed and updated as needed this visit by Provider                 Social History     Tobacco Use    Smoking status: Former     Types: Cigarettes     Quit date: 1977     Years since quittin.2     Passive exposure: Past    Smokeless tobacco: Never   Substance Use Topics    Alcohol use: Yes     Comment: 1 beer almost daily             10/18/2023    10:07 AM   Alcohol Use   Prescreen: >3 drinks/day or >7 drinks/week? No     Do you have a current opioid prescription? No  Do you use any other controlled substances or medications that are not prescribed by a provider? None        Current providers sharing in care for this patient include:   Patient Care Team:  Danyel Cortez MD as PCP - General (HOSPITALIST)  Tramaine Drummond MD as Assigned Heart and Vascular Provider  Danyel Cortez MD as Assigned PCP  Tramaine Drummond MD as MD (Cardiovascular Disease)    The following health maintenance items are reviewed in Epic and correct as of today:  Health Maintenance   Topic Date Due    DEPRESSION ACTION PLAN  Never done    RSV VACCINE 60+ (1 - 1-dose 60+ series) Never done    AORTIC ANEURYSM SCREENING (SYSTEM ASSIGNED)  Never done    COVID-19 Vaccine (2023- season) 2023    MEDICARE ANNUAL WELLNESS  VISIT  09/28/2023    PHQ-9  04/18/2024    DTAP/TDAP/TD IMMUNIZATION (3 - Td or Tdap) 08/29/2024    ANNUAL REVIEW OF HM ORDERS  08/31/2024    BMP  09/20/2024    LIPID  09/20/2024    TSH W/FREE T4 REFLEX  09/20/2024    FALL RISK ASSESSMENT  10/18/2024    ADVANCE CARE PLANNING  09/28/2027    COLORECTAL CANCER SCREENING  10/30/2029    HEPATITIS C SCREENING  Completed    INFLUENZA VACCINE  Completed    Pneumococcal Vaccine: 65+ Years  Completed    ZOSTER IMMUNIZATION  Completed    IPV IMMUNIZATION  Aged Out    HPV IMMUNIZATION  Aged Out    MENINGITIS IMMUNIZATION  Aged Out     Current Outpatient Medications   Medication Sig Dispense Refill    amLODIPine (NORVASC) 5 MG tablet TAKE ONE TABLET BY MOUTH EVERY DAY 90 tablet 1    aspirin 81 MG EC tablet Take 81 mg by mouth daily      atorvastatin (LIPITOR) 80 MG tablet Take 1 tablet (80 mg) by mouth daily 90 tablet 1    baclofen (LIORESAL) 10 MG tablet Take 1 tablet by mouth 3 times daily      cholecalciferol (VITAMIN-D) 1000 UNIT capsule Take 3 capsules by mouth daily 90 capsule 3    citalopram (CELEXA) 10 MG tablet Take 1 tablet (10 mg) by mouth daily 90 tablet 1    gabapentin (NEURONTIN) 300 MG capsule Take 2 capsules (600 mg) by mouth 3 times daily 540 capsule 3    levothyroxine (SYNTHROID/LEVOTHROID) 112 MCG tablet Take 1 tablet (112 mcg) by mouth daily 90 tablet 3    losartan (COZAAR) 50 MG tablet Take 1.5 tablets (75 mg) by mouth daily 135 tablet 3    Multiple Vitamin (MULTIVITAMIN) per tablet Take 1 tablet by mouth daily. 100 tablet 12    tadalafil (CIALIS) 5 MG tablet Take 1-2 tabs every 48 hrs as needed for sexual activity 30 tablet 3             Review of Systems   Constitutional:  Negative for chills and fever.   HENT:  Negative for congestion, ear pain, hearing loss and sore throat.    Eyes:  Negative for pain and visual disturbance.   Respiratory:  Negative for cough and shortness of breath.    Cardiovascular:  Negative for chest pain, palpitations and peripheral  "edema.   Gastrointestinal:  Negative for abdominal pain, constipation, diarrhea, heartburn, hematochezia and nausea.   Genitourinary:  Positive for impotence. Negative for dysuria, frequency, genital sores, hematuria, penile discharge and urgency.   Musculoskeletal:  Negative for arthralgias, joint swelling and myalgias.   Skin:  Negative for rash.   Neurological:  Negative for dizziness, weakness, headaches and paresthesias.   Psychiatric/Behavioral:  Negative for mood changes. The patient is not nervous/anxious.          OBJECTIVE:   BP (!) 173/83 (BP Location: Right arm, Patient Position: Sitting)   Pulse 56   Temp 97.9  F (36.6  C) (Tympanic)   Resp 16   Ht 1.702 m (5' 7\")   Wt 70.8 kg (156 lb)   SpO2 99%   BMI 24.43 kg/m   Estimated body mass index is 24.43 kg/m  as calculated from the following:    Height as of this encounter: 1.702 m (5' 7\").    Weight as of this encounter: 70.8 kg (156 lb).  Physical Exam  GENERAL: healthy, alert and no distress  NECK: no adenopathy, no asymmetry, masses, or scars and thyroid normal to palpation  RESP: lungs clear to auscultation - no rales, rhonchi or wheezes  CV: regular rate and rhythm, normal S1 S2, no S3 or S4, no murmur, click or rub, no peripheral edema and peripheral pulses strong  ABDOMEN: soft, nontender, no hepatosplenomegaly, no masses and bowel sounds normal  MS: no gross musculoskeletal defects noted, no edema    Diagnostic Test Results:  Labs reviewed in Epic    ASSESSMENT / PLAN:     Problem List Items Addressed This Visit          Nervous and Auditory    Multiple sclerosis (H)     relapsing/remitting - follows with neurology   - on glatiramer    - takes baclofen for spasticity - well controlled            Respiratory    Mixed sleep apnea     some endorsed sleep apneic features (witnessed pauses, heavy snoring)   - h/o laryngeal resection   - sleep study ('18) demonstrating moderate sleep apnea - opting not to pursue CPAP at this time  '23: Repeat " sleep study demonstrating mixed obstructive and central sleep apnea features with significant AHI  -Plan for home CPAP trial            Endocrine    Pure hypercholesterolemia    Postoperative hypothyroidism     - Maintain on levothyroxine         Relevant Medications    levothyroxine (SYNTHROID/LEVOTHROID) 112 MCG tablet       Circulatory    Essential hypertension     current antihypertensive regimen: losartan 50mg daily, amlodipine 5mg daily  regimen changes:   intolerance:   future titration/work-up plan:              Behavioral    Generalized anxiety disorder     '23: Would like to try to wean off of citalopram; reduced from 20 mg to 10 mg daily for planned 3 months and then to taper off completely over the following 3 months         Relevant Medications    citalopram (CELEXA) 10 MG tablet       Other    Health care maintenance     - CRC due '29   - continue annual PSA testing   - completed Shingrix   - expected grieving with passing of wife in summer, 2020 (lung cancer)   -Vaccinations discussed and updated accordingly   - cialis prn          Other Visit Diagnoses       Encounter for Medicare annual wellness exam    -  Primary    Squamous cell carcinoma of tonsil (H)        Relevant Medications    citalopram (CELEXA) 10 MG tablet    Hypothyroidism due to Hashimoto's thyroiditis        Relevant Medications    levothyroxine (SYNTHROID/LEVOTHROID) 112 MCG tablet    Erectile dysfunction, unspecified erectile dysfunction type        Relevant Medications    tadalafil (CIALIS) 5 MG tablet            Patient has been advised of split billing requirements and indicates understanding: Yes      COUNSELING:  Reviewed preventive health counseling, as reflected in patient instructions        He reports that he quit smoking about 46 years ago. His smoking use included cigarettes. He has been exposed to tobacco smoke. He has never used smokeless tobacco.      Appropriate preventive services were discussed with this patient,  including applicable screening as appropriate for fall prevention, nutrition, physical activity, Tobacco-use cessation, weight loss and cognition.  Checklist reviewing preventive services available has been given to the patient.    Reviewed patients plan of care and provided an AVS. The Basic Care Plan (routine screening as documented in Health Maintenance) for Jarrod meets the Care Plan requirement. This Care Plan has been established and reviewed with the Patient.          Danyel Cortez MD  [facility]    Identified Health Risks:  I have reviewed Opioid Use Disorder and Substance Use Disorder risk factors and made any needed referrals.   Answers submitted by the patient for this visit:  Patient Health Questionnaire (Submitted on 10/18/2023)  If you checked off any problems, how difficult have these problems made it for you to do your work, take care of things at home, or get along with other people?: Not difficult at all  PHQ9 TOTAL SCORE: 0

## 2023-10-26 ENCOUNTER — DOCUMENTATION ONLY (OUTPATIENT)
Dept: FAMILY MEDICINE | Facility: CLINIC | Age: 73
End: 2023-10-26
Payer: COMMERCIAL

## 2023-10-26 NOTE — PROGRESS NOTES
Patient was offered choice of vendor and chose [facility].  Patient Jarrod [employer] was set up at [place] on October 26, 2023. Patient received a Resmed Airsense 10 Pressures were set at  7 cm H2O.   Patient s ramp is 5 cm H2O for Auto and FLEX/EPR is EPR, 2.  Patient received a [employer] Mask name: DreamWear Nasal mask size Medium, heated tubing and heated humidifier.  Patient has the following compliance requirements: using and visit requirements.  Patient will make a follow up with Dr. Goldman.    EM BURNS

## 2023-10-30 DIAGNOSIS — I25.119 CORONARY ARTERY DISEASE INVOLVING NATIVE CORONARY ARTERY OF NATIVE HEART WITH ANGINA PECTORIS (H): ICD-10-CM

## 2023-10-30 RX ORDER — ATORVASTATIN CALCIUM 80 MG/1
80 TABLET, FILM COATED ORAL DAILY
Qty: 90 TABLET | Refills: 2 | Status: SHIPPED | OUTPATIENT
Start: 2023-10-30 | End: 2024-01-19

## 2023-11-29 ENCOUNTER — TELEPHONE (OUTPATIENT)
Dept: FAMILY MEDICINE | Facility: CLINIC | Age: 73
End: 2023-11-29
Payer: COMMERCIAL

## 2023-11-29 NOTE — TELEPHONE ENCOUNTER
Monitor summary  SR-ST   R PVC's  .18/.08/.38    12 hour chart check     Received voicemail on HST scheduling  line for Magda. Patient states he had an appt tomorrow that he is unable to make it to but needs to speak with Magda.    Returned call and left message for patient letting him know he had the wrong phone number- let him know that the number we have for Magda is [phone].

## 2023-12-25 ENCOUNTER — MEDICAL CORRESPONDENCE (OUTPATIENT)
Dept: HEALTH INFORMATION MANAGEMENT | Facility: CLINIC | Age: 73
End: 2023-12-25
Payer: COMMERCIAL

## 2023-12-26 ENCOUNTER — OFFICE VISIT (OUTPATIENT)
Dept: FAMILY MEDICINE | Facility: CLINIC | Age: 73
End: 2023-12-26
Attending: INTERNAL MEDICINE
Payer: COMMERCIAL

## 2023-12-26 VITALS
HEART RATE: 65 BPM | DIASTOLIC BLOOD PRESSURE: 79 MMHG | HEIGHT: 67 IN | SYSTOLIC BLOOD PRESSURE: 138 MMHG | TEMPERATURE: 97.5 F | WEIGHT: 158 LBS | RESPIRATION RATE: 16 BRPM | BODY MASS INDEX: 24.8 KG/M2 | OXYGEN SATURATION: 98 %

## 2023-12-26 DIAGNOSIS — G47.39 MIXED SLEEP APNEA: ICD-10-CM

## 2023-12-26 DIAGNOSIS — I25.119 CORONARY ARTERY DISEASE INVOLVING NATIVE CORONARY ARTERY OF NATIVE HEART WITH ANGINA PECTORIS (H): Primary | ICD-10-CM

## 2023-12-26 DIAGNOSIS — F33.41 RECURRENT MAJOR DEPRESSIVE DISORDER, IN PARTIAL REMISSION (H): ICD-10-CM

## 2023-12-26 PROCEDURE — 99213 OFFICE O/P EST LOW 20 MIN: CPT | Performed by: INTERNAL MEDICINE

## 2023-12-26 ASSESSMENT — ENCOUNTER SYMPTOMS
HEARTBURN: 0
SLEEP DISTURBANCE: 0
HEADACHES: 0
SHORTNESS OF BREATH: 0
FATIGUE: 0
UNEXPECTED WEIGHT CHANGE: 0

## 2023-12-26 NOTE — PROGRESS NOTES
Assessment & Plan   Problem List Items Addressed This Visit       Coronary artery disease involving native coronary artery of native heart with angina pectoris (H24) - Primary     Angiogram 9/9/21  Mid LAD lesion is 55% stenosed.  Pressure wire/FFR was performed on the lesion. FFR: 0.81.  The left ventricular ejection fraction is grossly normal.  Ost LM to Mid LM lesion is 15% stenosed.  Ost LAD to Prox LAD lesion is 40% stenosed.  RPDA lesion is 45% stenosed.  No angina or CHF         Mixed sleep apnea     - 8/21/2018 HST showed moderate obstructive sleep apnea with AHI 24 and oximetry maxine 80%.  Central apneas noted at 8.9/h with 23% Cheyne-Metz respiration  -9/19/2018 optimal CPAP titration to 6 cm of water pressure  -12/13/2018 CPAP discontinued as it was ineffective, uncomfortable, and Complinace reports indicated AHI is varying from 0-76 on therapy    -Polysomnogram 5/15/23  Respiratory events: Patient was found to have a total of 76 hypopneas and 90 central apneas, patient was also found to have 28 obstructive apneas this overall led to an apnea-hypopnea index of 37 events per hour consistent with a case of primarily obstructive severe sleep apnea, however patient has a generous central component as well.    Oxygen saturation: Lowest recorded oxygen saturation was at 79% with patient spending 30 minutes with sat less than 89% consistent with moderate desaturations.    Severe symptomatic sleep apnea predominant obstructive but with significant point of central apneas likely due to his neurologic disease.    - 9/13/2023 CPAP titration to 7 cm of water pressure- 8/21/2018 HST showed moderate obstructive sleep apnea with AHI 24 and oximetry maxine 80%.  Central apneas noted at 8.9/h with 23% Cheyne-Metz respiration  -9/19/2018 optimal CPAP titration to 6 cm of water pressure  -12/13/2018 CPAP discontinued as it was ineffective, uncomfortable, and Complinace reports indicated AHI is varying from 0-76 on  therapy    -Polysomnogram 5/15/23  Respiratory events: Patient was found to have a total of 76 hypopneas and 90 central apneas, patient was also found to have 28 obstructive apneas this overall led to an apnea-hypopnea index of 37 events per hour consistent with a case of primarily obstructive severe sleep apnea, however patient has a generous central component as well.    Oxygen saturation: Lowest recorded oxygen saturation was at 79% with patient spending 30 minutes with sat less than 89% consistent with moderate desaturations.    Severe symptomatic sleep apnea predominant obstructive but with significant point of central apneas likely due to his neurologic disease.    - 9/13/2023 CPAP titration to 7 cm of water pressure  -12/26/2023 compliance report reveals use on 5457 nights, use over 4 hours on the majority of nights, average 7 hours and 48 minutes, AHI 6.5         Recurrent major depressive disorder, in partial remission (H24)     - On Celexa                      CPAP follow-up in 1 year or as needed    Mohsen Goldman MD  [facility]    Mejia Garay is a 73 year old, presenting for the following health issues:  CPAP Follow Up      12/26/2023    10:14 AM   Additional Questions   Roomed by LA       History of Present Illness       Reason for visit:  Sleep Apnea    He eats 2-3 servings of fruits and vegetables daily.He consumes 0 sweetened beverage(s) daily.He exercises with enough effort to increase his heart rate 30 to 60 minutes per day.  He exercises with enough effort to increase his heart rate 6 days per week.   He is taking medications regularly.     Patient has had an overall good experience with CPAP and has noticed improved energy level and wakefulness.  He missed the night because he was not sleeping at home and was not drowsy for the next day he noted              Review of Systems   Constitutional:  Negative for fatigue and unexpected weight change.   Respiratory:   "Negative for shortness of breath.    Cardiovascular:  Negative for chest pain.   Gastrointestinal:  Negative for heartburn.   Genitourinary:         Nocturia 1-2   Neurological:  Negative for headaches.   Psychiatric/Behavioral:  Negative for sleep disturbance.             Objective    /79 (BP Location: Right arm, Patient Position: Sitting, Cuff Size: Adult Large)   Pulse 65   Temp 97.5  F (36.4  C) (Tympanic)   Resp 16   Ht 1.702 m (5' 7\")   Wt 71.7 kg (158 lb)   SpO2 98%   BMI 24.75 kg/m    Body mass index is 24.75 kg/m .  Physical Exam   Appearing male in no distress  HEENT exam unremarkable.  Mallampati 1  Alert and oriented  Normal mood and affect                      "

## 2023-12-26 NOTE — ASSESSMENT & PLAN NOTE
- 8/21/2018 HST showed moderate obstructive sleep apnea with AHI 24 and oximetry maxine 80%.  Central apneas noted at 8.9/h with 23% Cheyne-Metz respiration  -9/19/2018 optimal CPAP titration to 6 cm of water pressure  -12/13/2018 CPAP discontinued as it was ineffective, uncomfortable, and Complinace reports indicated AHI is varying from 0-76 on therapy    -Polysomnogram 5/15/23  Respiratory events: Patient was found to have a total of 76 hypopneas and 90 central apneas, patient was also found to have 28 obstructive apneas this overall led to an apnea-hypopnea index of 37 events per hour consistent with a case of primarily obstructive severe sleep apnea, however patient has a generous central component as well.    Oxygen saturation: Lowest recorded oxygen saturation was at 79% with patient spending 30 minutes with sat less than 89% consistent with moderate desaturations.    Severe symptomatic sleep apnea predominant obstructive but with significant point of central apneas likely due to his neurologic disease.    - 9/13/2023 CPAP titration to 7 cm of water pressure- 8/21/2018 HST showed moderate obstructive sleep apnea with AHI 24 and oximetry maxine 80%.  Central apneas noted at 8.9/h with 23% Cheyne-Metz respiration  -9/19/2018 optimal CPAP titration to 6 cm of water pressure  -12/13/2018 CPAP discontinued as it was ineffective, uncomfortable, and Complinace reports indicated AHI is varying from 0-76 on therapy    -Polysomnogram 5/15/23  Respiratory events: Patient was found to have a total of 76 hypopneas and 90 central apneas, patient was also found to have 28 obstructive apneas this overall led to an apnea-hypopnea index of 37 events per hour consistent with a case of primarily obstructive severe sleep apnea, however patient has a generous central component as well.    Oxygen saturation: Lowest recorded oxygen saturation was at 79% with patient spending 30 minutes with sat less than 89% consistent with  moderate desaturations.    Severe symptomatic sleep apnea predominant obstructive but with significant point of central apneas likely due to his neurologic disease.    - 9/13/2023 CPAP titration to 7 cm of water pressure  -12/26/2023 compliance report reveals use on 5457 nights, use over 4 hours on the majority of nights, average 7 hours and 48 minutes, AHI 6.5

## 2024-01-18 ENCOUNTER — TELEPHONE (OUTPATIENT)
Dept: FAMILY MEDICINE | Facility: CLINIC | Age: 74
End: 2024-01-18
Payer: COMMERCIAL

## 2024-01-18 DIAGNOSIS — E06.3 HYPOTHYROIDISM DUE TO HASHIMOTO'S THYROIDITIS: ICD-10-CM

## 2024-01-18 DIAGNOSIS — C09.9 SQUAMOUS CELL CARCINOMA OF TONSIL (H): ICD-10-CM

## 2024-01-18 DIAGNOSIS — G35 MULTIPLE SCLEROSIS (H): ICD-10-CM

## 2024-01-18 DIAGNOSIS — E78.00 PURE HYPERCHOLESTEROLEMIA: ICD-10-CM

## 2024-01-18 DIAGNOSIS — I10 BENIGN ESSENTIAL HYPERTENSION: ICD-10-CM

## 2024-01-18 NOTE — TELEPHONE ENCOUNTER
Fax from [facility].  Pt is asking that all medication be changed to [facility]. Will no longer be using [facility].

## 2024-01-19 DIAGNOSIS — I25.119 CORONARY ARTERY DISEASE INVOLVING NATIVE CORONARY ARTERY OF NATIVE HEART WITH ANGINA PECTORIS (H): ICD-10-CM

## 2024-01-19 RX ORDER — GABAPENTIN 300 MG/1
900 CAPSULE ORAL 3 TIMES DAILY
Qty: 810 CAPSULE | Refills: 1 | Status: SHIPPED | OUTPATIENT
Start: 2024-01-19

## 2024-01-19 RX ORDER — LOSARTAN POTASSIUM 50 MG/1
75 TABLET ORAL DAILY
Qty: 135 TABLET | Refills: 1 | Status: SHIPPED | OUTPATIENT
Start: 2024-01-19 | End: 2024-10-02

## 2024-01-19 RX ORDER — CITALOPRAM HYDROBROMIDE 10 MG/1
10 TABLET ORAL DAILY
Qty: 90 TABLET | Refills: 1 | Status: SHIPPED | OUTPATIENT
Start: 2024-01-19

## 2024-01-19 RX ORDER — AMLODIPINE BESYLATE 5 MG/1
5 TABLET ORAL DAILY
Qty: 90 TABLET | Refills: 1 | Status: SHIPPED | OUTPATIENT
Start: 2024-01-19 | End: 2024-04-16

## 2024-01-19 RX ORDER — BACLOFEN 10 MG/1
20 TABLET ORAL 3 TIMES DAILY
Qty: 540 TABLET | Refills: 1 | Status: SHIPPED | OUTPATIENT
Start: 2024-01-19

## 2024-01-19 RX ORDER — ASPIRIN 81 MG/1
81 TABLET ORAL DAILY
COMMUNITY
Start: 2024-01-19

## 2024-01-19 RX ORDER — LEVOTHYROXINE SODIUM 112 UG/1
112 TABLET ORAL DAILY
Qty: 90 TABLET | Refills: 1 | Status: SHIPPED | OUTPATIENT
Start: 2024-01-19 | End: 2024-05-08

## 2024-01-19 RX ORDER — ATORVASTATIN CALCIUM 80 MG/1
80 TABLET, FILM COATED ORAL DAILY
Qty: 90 TABLET | Refills: 2 | Status: SHIPPED | OUTPATIENT
Start: 2024-01-19 | End: 2024-05-08

## 2024-01-19 NOTE — TELEPHONE ENCOUNTER
Medication Question or Refill  What medication are you calling about (include dose and sig)?:       Preferred Pharmacy:   [facility] [address] [address]  Phone: [phone] Fax: [phone]    Controlled Substance Agreement on file:   CSA -- Patient Level:    CSA: None found at the patient level.       Who prescribed the medication?: Dr. Tramaine Drummond     Do you need a refill? Yes    Could we send this information to you in [employer] or would you prefer to receive a phone call?:   Patient would prefer a phone call   Okay to leave a detailed message?: Yes at Cell number on file:    Telephone Information:   Mobile [phone]

## 2024-02-26 ENCOUNTER — TELEPHONE (OUTPATIENT)
Dept: FAMILY MEDICINE | Facility: CLINIC | Age: 74
End: 2024-02-26
Payer: COMMERCIAL

## 2024-02-26 DIAGNOSIS — M53.3 SACROILIAC JOINT PAIN: Primary | ICD-10-CM

## 2024-02-26 NOTE — TELEPHONE ENCOUNTER
Order/Referral Request    Who is requesting: Pt    Orders being requested: referral for PT    Reason service is needed/diagnosis: SI joint has gone out and is looking to see PT again.    When are orders needed by: asap    Has this been discussed with Provider: Yes    Does patient have a preference on a Group/Provider/Facility? [facility], PT: Celina shafer    Does patient have an appointment scheduled?: No    Where to send orders: Fax    Could we [facility] this information to you [facility] or would you prefer to receive a phone call?:   Patient would prefer a phone call   Okay to leave a detailed message?: Yes at Cell number on file:    Telephone Information:   Mobile [phone]

## 2024-03-12 ENCOUNTER — TRANSFERRED RECORDS (OUTPATIENT)
Dept: HEALTH INFORMATION MANAGEMENT | Facility: CLINIC | Age: 74
End: 2024-03-12

## 2024-03-14 ENCOUNTER — TELEPHONE (OUTPATIENT)
Dept: CARDIOLOGY | Facility: CLINIC | Age: 74
End: 2024-03-14
Payer: COMMERCIAL

## 2024-03-14 NOTE — TELEPHONE ENCOUNTER
[facility] Call Center    Phone Message    May a detailed message be left on voicemail: yes     Reason for Call: Other: Patient calling regarding being in the hospital. Patient had a stroke. Patient would like Dr. Drummond to call him back. Patient is having surgery on 3/18/24 for right aortic artery. It is 75 % blocked. Please reach out. Thank you         Action Taken: Other: cardiology     Travel Screening: Not Applicable

## 2024-03-14 NOTE — TELEPHONE ENCOUNTER
Spoke with Pt regarding call back request from Pt, discussed upcoming procedure for carotid endarterectomy and recent hospitalization. Pt expressed understanding-IZABELLA

## 2024-03-20 PROBLEM — I63.9 STROKE (CEREBRUM) (H): Status: ACTIVE | Noted: 2024-03-13

## 2024-03-20 RX ORDER — TAMSULOSIN HYDROCHLORIDE 0.4 MG/1
0.4 CAPSULE ORAL DAILY
COMMUNITY
Start: 2024-03-20 | End: 2024-05-08

## 2024-03-20 RX ORDER — CLOPIDOGREL BISULFATE 75 MG/1
75 TABLET ORAL DAILY
COMMUNITY
Start: 2024-03-15 | End: 2024-04-09

## 2024-03-21 ENCOUNTER — OFFICE VISIT (OUTPATIENT)
Dept: FAMILY MEDICINE | Facility: CLINIC | Age: 74
End: 2024-03-21
Payer: COMMERCIAL

## 2024-03-21 VITALS
RESPIRATION RATE: 14 BRPM | BODY MASS INDEX: 24.48 KG/M2 | OXYGEN SATURATION: 100 % | HEIGHT: 67 IN | HEART RATE: 62 BPM | TEMPERATURE: 97.8 F | DIASTOLIC BLOOD PRESSURE: 74 MMHG | WEIGHT: 156 LBS | SYSTOLIC BLOOD PRESSURE: 154 MMHG

## 2024-03-21 DIAGNOSIS — I63.9 CEREBROVASCULAR ACCIDENT (CVA), UNSPECIFIED MECHANISM (H): ICD-10-CM

## 2024-03-21 DIAGNOSIS — Z98.890 HISTORY OF RIGHT-SIDED CAROTID ENDARTERECTOMY: Primary | ICD-10-CM

## 2024-03-21 PROCEDURE — 99213 OFFICE O/P EST LOW 20 MIN: CPT | Performed by: FAMILY MEDICINE

## 2024-03-21 ASSESSMENT — PATIENT HEALTH QUESTIONNAIRE - PHQ9
SUM OF ALL RESPONSES TO PHQ QUESTIONS 1-9: 2
10. IF YOU CHECKED OFF ANY PROBLEMS, HOW DIFFICULT HAVE THESE PROBLEMS MADE IT FOR YOU TO DO YOUR WORK, TAKE CARE OF THINGS AT HOME, OR GET ALONG WITH OTHER PEOPLE: NOT DIFFICULT AT ALL
SUM OF ALL RESPONSES TO PHQ QUESTIONS 1-9: 2

## 2024-03-21 NOTE — PROGRESS NOTES
Assessment & Plan     History of right-sided carotid endarterectomy  Patient status post CVA and right carotid enterectomy doing well follow-up with his primary schedule track his blood pressures they have been fine at home.  He will bring his cuff in next time    Cerebrovascular accident (CVA), unspecified mechanism (H)  Overall doing well starting rehab soon          MED REC REQUIRED  Post Medication Reconciliation Status:         Mejia Garay is a 73 year old, presenting for the following health issues: Patient had recent stroke with no residual and carotid enterectomy please see hospital discharge summary below  HOSPITALIST DISCHARGE SUMMARY [facility]    Admission Date: 3/12/2024  Discharge Date: 3/14/2024    Discharge Plan: Jarrod Mancera was discharged to home.    Principal Diagnosis    Stroke (cerebrum) (HC)    Hospital Problem List  Principal Problem:  Stroke (cerebrum) (HC)  Active Problems:  Hypertension  hyperlipidemia  Major depression, recurrent (HC)  Anxiety    Diagnoses impacting complexity: none to add    Hospital Course  Mr. Jarrod Mancera is a 73 y.o. male with a history of multiple sclerosis, hypertension, hyperlipidemia who presents with left-sided arm weakness. Patient's last known well was 11 PM on 3/11/2024. The patient woke up at 4 AM and immediately noticed that his left arm was not working. Patient had difficulty with  strength as well as numbness and tingling. Patient also reports abnormal vision with difficulty discerning numbers and faces of clocks. Symptoms have slowly improved but not normalized. Patient reports that symptoms are very different from previous episodes of MS flare. No previous history of strokes. Neurology consulted on admission. Initial MRI showed bilateral acute infarct. Neurology thought that this may be artifact. Repeat MRI completed on 3/13 which showed acute infarction of the right parietal and occipital lobes. Neurology felt more  confident that carotid stenosis on the right was causing a problem. Vascular surgery consulted 3/13. They recommend carotid endarterectomy. Earliest this could be scheduled was 3/18/2024. Patient was seen by ENT to evaluate vocal cords given patient's previous history of tongue cancer and radiation. This was completed on 3/14. Patient cleared to discharge home 3/14 with dual antiplatelet therapy, resumption of his blood pressure medications and statin. He will return to the hospital on 3/18 for carotid endarterectomy surgery. Patient discharged home with outpatient PT/OT.      Recommendations for Outpatient Provider PCP: No Pcp    Admission: 3/12/2024 @ [facility]    Specific recommendations to be addressed at the follow up visit: Acute stroke  Medication changes: Added Plavix 75 mg daily  Follow up labs/imaging: None  Other specialty follow-up not included in DC orders: None        Follow-Up    Discharge Medications        Your Home Medicines      START taking these medicines    Instructions  clopidogreL 75 mg tablet  For diagnoses: Stroke (HC)  Start taking on: March 15, 2024  Commonly known as: PLAVIX  Take 1 Tablet (75 mg) by mouth once daily.          CONTINUE taking these medicines    Instructions  amLODIPine 5 mg tablet  Commonly known as: NORVASC  Take 5 mg by mouth once daily.    aspirin 81 mg enteric coated tablet  Commonly known as: ECOTRIN  Take 81 mg by mouth once daily with a meal.    atorvastatin 80 mg tablet  Commonly known as: LIPITOR  Take 80 mg by mouth once daily.    baclofen 10 mg tablet  Commonly known as: LIORESAL  Take 20 mg by mouth 3 times daily. Indications: Muscle Spasms caused by a Spinal Disease    citalopram 10 mg tablet  Commonly known as: CELEXA  Take 10 mg by mouth every morning.    gabapentin 300 mg capsule  Commonly known as: NEURONTIN  Take by mouth. 900 mg in the morning, 600 mg at noon, and 900 mg at bedtime    levothyroxine 112 mcg tablet  Commonly known as:  "SYNTHROID  Take 112 mcg by mouth before breakfast.    losartan 50 mg tablet  Commonly known as: COZAAR  Take 75 mg by mouth once daily. Take 75mg (1.5 tablets) daily    multivitamin tablet  Commonly known as: MVI  Take 1 tablet by mouth once daily. Indications: VITAMIN DEFICIENCY PREVENTION    Vitamin D 1,000 unit capsule  Generic drug: cholecalciferol  Take 3,000 units by mouth once daily.      Surgical Followup        3/21/2024    10:30 AM   Additional Questions   Roomed by Mai LUONG     [facility] Follow-up Visit:    Hospital/Nursing Home/ Rehab Facility:  [facility]   Date of Admission: 3/18/2024  Date of Discharge: 3/19/2024  Reason(s) for Admission: RIGHT CAROTID ENDARTERECTOMY W/EEG  after stroke    Was your hospitalization related to COVID-19? No   Problems taking medications regularly:    Medication changes since discharge:   Problems adhering to non-medication therapy:      Summary of hospitalization:  [facility] discharge summary reviewed  Diagnostic Tests/Treatments reviewed.  Follow up needed: none  Other Healthcare Providers Involved in Patient s Care:         None  Update since discharge: improved.         Plan of care communicated with patient                   Review of Systems  Constitutional, HEENT, cardiovascular, pulmonary, gi and gu systems are negative, except as otherwise noted.      Objective    BP (!) 154/74   Pulse 62   Temp 97.8  F (36.6  C)   Resp 14   Ht 1.702 m (5' 7\")   Wt 70.8 kg (156 lb)   SpO2 100%   BMI 24.43 kg/m    Body mass index is 24.43 kg/m .  Physical Exam   Patient alert no acute distress neurologic exam grossly intact nicely healing right endarterectomy surgical site          Signed Electronically by: Tristan Arellano MD    Answers submitted by the patient for this visit:  Patient Health Questionnaire (Submitted on 3/21/2024)  If you checked off any problems, how difficult have these problems made it for you to do your work, take care of " things at home, or get along with other people?: Not difficult at all  PHQ9 TOTAL SCORE: 2

## 2024-04-09 DIAGNOSIS — I25.119 CORONARY ARTERY DISEASE INVOLVING NATIVE CORONARY ARTERY OF NATIVE HEART WITH ANGINA PECTORIS (H): Primary | ICD-10-CM

## 2024-04-10 RX ORDER — CLOPIDOGREL BISULFATE 75 MG/1
75 TABLET ORAL DAILY
Qty: 90 TABLET | Refills: 3 | Status: SHIPPED | OUTPATIENT
Start: 2024-04-10 | End: 2024-04-16

## 2024-04-16 ENCOUNTER — OFFICE VISIT (OUTPATIENT)
Dept: CARDIOLOGY | Facility: CLINIC | Age: 74
End: 2024-04-16
Payer: COMMERCIAL

## 2024-04-16 VITALS
HEART RATE: 58 BPM | DIASTOLIC BLOOD PRESSURE: 78 MMHG | SYSTOLIC BLOOD PRESSURE: 146 MMHG | BODY MASS INDEX: 24.01 KG/M2 | WEIGHT: 153 LBS | RESPIRATION RATE: 16 BRPM | HEIGHT: 67 IN

## 2024-04-16 DIAGNOSIS — I25.119 CORONARY ARTERY DISEASE INVOLVING NATIVE CORONARY ARTERY OF NATIVE HEART WITH ANGINA PECTORIS (H): ICD-10-CM

## 2024-04-16 DIAGNOSIS — I10 ESSENTIAL HYPERTENSION: ICD-10-CM

## 2024-04-16 DIAGNOSIS — I25.10 CORONARY ARTERY DISEASE INVOLVING NATIVE CORONARY ARTERY OF NATIVE HEART WITHOUT ANGINA PECTORIS: Primary | ICD-10-CM

## 2024-04-16 DIAGNOSIS — I10 BENIGN ESSENTIAL HYPERTENSION: ICD-10-CM

## 2024-04-16 PROCEDURE — 99214 OFFICE O/P EST MOD 30 MIN: CPT | Performed by: INTERNAL MEDICINE

## 2024-04-16 RX ORDER — AMLODIPINE BESYLATE 10 MG/1
10 TABLET ORAL DAILY
Qty: 90 TABLET | Refills: 3 | Status: SHIPPED | OUTPATIENT
Start: 2024-04-16

## 2024-04-16 RX ORDER — EZETIMIBE 10 MG/1
10 TABLET ORAL DAILY
Qty: 90 TABLET | Refills: 3 | Status: SHIPPED | OUTPATIENT
Start: 2024-04-16 | End: 2024-07-25 | Stop reason: ALTCHOICE

## 2024-04-16 NOTE — LETTER
"4/16/2024    Danyel Cortez MD  [address] [facility] [address]    RE: Jarrod Mancera       Dear Colleague,     I had the pleasure of seeing Jarrod [facility]cer[facility] in [facility].      [facility]  [phone]          Assessment/Recommendations   Patient with known coronary artery disease with 55% stenosis in his LAD by previous heart catheterization.  He had a recent cerebrovascular accident and 70% plaque in his right coronary artery which was removed by carotid endarterectomy last month.  He is doing well and has limited residual, only some visual changes.    Will be more aggressive with his lipids and get his LDL below 70.  Will add Zetia 10 mg a day and recheck a fasting lipid panel in 3 months.    Blood pressures at home are not quite at goal and will recommend increasing amlodipine to 10 mg a day and asked him to call us with some blood pressure readings.  He will also follow-up with Dr. Cortez in [place].    Asked him to increase cardio exercise to 5 days a week which he will take under advisement but I think he will do.  Asked him to work on a Mediterranean diet and read the book \"the size of a long life\" which he bought in the past but is not ready.    His vascular surgeon said he can stop his Plavix so I agree that this could be discontinued as well.  30 minutes spent with chart review, patient visit, and documentation.     History of Present Illness/Subjective    Mr. Jarrod Mancera is a 73 year old male with known moderate coronary artery disease with LAD stenosis at 55% on previous coronary angiography.  He is had some minimal chest discomfort but did present with a stroke and ultimately had right carotid endarterectomy last month.  He has been feeling pretty well.  He could have recovered from his stroke with the exception of some visual changes and has a follow-up with a neurologist in the near future.    He denies orthopnea, paroxysmal nocturnal dyspnea, " "peripheral edema, syncope near syncope or palpitations.  Blood pressures at home are above goal at times.           Physical Examination Review of Systems   BP (!) 146/78 (BP Location: Right arm, Patient Position: Sitting, Cuff Size: Adult Regular)   Pulse 58   Resp 16   Ht 1.702 m (5' 7\")   Wt 69.4 kg (153 lb)   BMI 23.96 kg/m    Body mass index is 23.96 kg/m .  Wt Readings from Last 3 Encounters:   04/16/24 69.4 kg (153 lb)   03/21/24 70.8 kg (156 lb)   12/26/23 71.7 kg (158 lb)     General Appearance:   Alert, cooperative and in no acute distress.   ENT/Mouth: Pink/moist oral mucosa   EYES:  no scleral icterus, normal conjunctivae   Neck: JVP normal. No Hepatojugular reflux. Thyroid not visualized.   Chest/Lungs:   Lungs are clear to auscultation, equal chest wall expansion.   Cardiovascular:   S1, S2 without murmur ,clicks or rubs. Brachial, radial and posterior tibial pulses are intact and symetric. No carotid bruits noted   Abdomen:  Nontender. BS+.   Extremities: No cyanosis, clubbing or edema   Skin: no xanthelasma, warm.    Neurologic: normal arm movement bilateral, no tremors     Psychiatric: Appropriate affect.      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;    Family history of myocardial infarction     High cholesterol     Hypertension     Hypertension     MS (multiple sclerosis) (H)     Multiple sclerosis (H)     Past Surgical History:   Procedure Laterality Date    ARTHROSCOPY SHOULDER  01/01/1990    L Shoulder    COLONOSCOPY      2/2008; 3/25/2009; 10/30/2019--normal, no further screenings needed d/t adv age 77 when due    CV CORONARY ANGIOGRAM N/A 09/09/2021    Procedure: Coronary Angiogram;  Surgeon: Placido Ang, " MD;  Location: [facility] CATH LAB CV    CV FRACTIONAL FLOW RATIO WIRE N/A 2021    Procedure: Fractional Flow Ratio Wire;  Surgeon: Placido Ang MD;  Location: [facility] CATH LAB CV    CV LEFT VENTRICULOGRAM N/A 2021    Procedure: Left Ventriculogram;  Surgeon: Placido Ang MD;  Location: [facility] CATH LAB CV    FLEXIBLE SIGMOIDOSCOPY  2004    HEAD & NECK SURGERY      Robotic Surgery @ [facility]     HERNIA REPAIR  2006    KNEE SURGERY  1970    KNEE SURGERY Left 1970    LAPAROSCOPY  2005    POLYSOMNOGRAPHY  2018    SHOULDER SURGERY      STRESS TEST      2007,2010    TONSILLECTOMY & ADENOIDECTOMY      no date    Family History   Problem Relation Age of Onset    Acute Myocardial Infarction Mother          at age: 70 years    Hypertension Mother     Heart Disease Mother     Acute Myocardial Infarction Father          at age: 52 years Cause of death: heart attack    Heart Disease Father     Myocardial Infarction Father     CABG Sister     No Known Problems Sister         age: 66 years    Ovarian Cancer Sister         +angioplaty=51years   at age: 60 years    Heart Disease Sister     Heart Disease Sister         ASHD, + Angioplasty=54 years  age: 76 years    Breast Cancer Other     Migraines Other     Multiple Sclerosis Other     Cancer No family hx of         no skin cancer    Social History     Socioeconomic History    Marital status:      Spouse name: Not on file    Number of children: Not on file    Years of education: Not on file    Highest education level: Not on file   Occupational History    Occupation: Retired    Tobacco Use    Smoking status: Former     Current packs/day: 0.00     Types: Cigarettes     Quit date: 1977     Years since quittin.7     Passive exposure: Past    Smokeless tobacco: Never   Vaping Use    Vaping status: Never Used   Substance and Sexual Activity    Alcohol use:  Yes     Comment: 1 beer almost daily    Drug use: Not Currently     Types: Marijuana     Comment: on/off    Sexual activity: Not on file   Other Topics Concern    Parent/sibling w/ CABG, MI or angioplasty before 65F 55M? Not Asked   Social History Narrative    Not on file     Social Determinants of Health     Financial Resource Strain: Low Risk  (12/25/2023)    Financial Resource Strain     Within the past 12 months, have you or your family members you live with been unable to get utilities (heat, electricity) when it was really needed?: No   Food Insecurity: Low Risk  (12/25/2023)    Food Insecurity     Within the past 12 months, did you worry that your food would run out before you got money to buy more?: No     Within the past 12 months, did the food you bought just not last and you didn t have money to get more?: No   Transportation Needs: Low Risk  (12/25/2023)    Transportation Needs     Within the past 12 months, has lack of transportation kept you from medical appointments, getting your medicines, non-medical meetings or appointments, work, or from getting things that you need?: No   Physical Activity: Not on file   Stress: Not on file   Social Connections: Unknown (3/12/2024)    [facility]    Social Connections     Frequency of Communication with Friends and Family: Not on file   Interpersonal Safety: Not on file   Housing Stability: Low Risk  (12/25/2023)    Housing Stability     Do you have housing? : Yes     Are you worried about losing your housing?: No          Medications  Allergies   Current Outpatient Medications   Medication Sig Dispense Refill    amLODIPine (NORVASC) 10 MG tablet Take 1 tablet (10 mg) by mouth daily 90 tablet 3    aspirin 81 MG EC tablet Take 1 tablet (81 mg) by mouth daily      atorvastatin (LIPITOR) 80 MG tablet Take 1 tablet (80 mg) by mouth daily 90 tablet 2    baclofen (LIORESAL) 10  MG tablet Take 2 tablets (20 mg) by mouth 3 times daily 540 tablet 1    cholecalciferol (VITAMIN-D) 1000 UNIT capsule Take 3 capsules by mouth daily 90 capsule 3    citalopram (CELEXA) 10 MG tablet Take 1 tablet (10 mg) by mouth daily 90 tablet 1    clopidogrel (PLAVIX) 75 MG tablet Take 1 tablet (75 mg) by mouth daily 90 tablet 3    diphenhydrAMINE-acetaminophen (TYLENOL PM)  MG tablet Take 1 tablet by mouth nightly as needed for sleep      ezetimibe (ZETIA) 10 MG tablet Take 1 tablet (10 mg) by mouth daily 90 tablet 3    gabapentin (NEURONTIN) 300 MG capsule Take 3 capsules (900 mg) by mouth 3 times daily 810 capsule 1    levothyroxine (SYNTHROID/LEVOTHROID) 112 MCG tablet Take 1 tablet (112 mcg) by mouth daily 90 tablet 1    losartan (COZAAR) 50 MG tablet Take 1.5 tablets (75 mg) by mouth daily 135 tablet 1    Multiple Vitamin (DAILY-VITAMIN) TABS Take 1 tablet by mouth daily      tadalafil (CIALIS) 5 MG tablet Take 1-2 tabs every 48 hrs as needed for sexual activity 30 tablet 3    tamsulosin (FLOMAX) 0.4 MG capsule Take 0.4 mg by mouth daily      Allergies   Allergen Reactions    Codeine Sulfate Rash         Lab Results    Chemistry/lipid CBC Cardiac Enzymes/BNP/TSH/INR   Lab Results   Component Value Date    CHOL 164 09/20/2023    HDL 51 09/20/2023    TRIG 81 09/20/2023    BUN 29.3 (H) 09/20/2023     09/20/2023    CO2 26 09/20/2023    Lab Results   Component Value Date    WBC 6.1 09/09/2021    HGB 14.0 09/09/2021    HCT 41.7 09/09/2021    MCV 97 09/09/2021     09/09/2021    Lab Results   Component Value Date    TSH 3.73 09/20/2023                  Thank you for allowing me to participate in the care of your patient.      Sincerely,     Tramaine Drummond MD     [facility]  cc:   Danyel Cortez MD  [facility] [place] [address] [place] [address]

## 2024-04-16 NOTE — PROGRESS NOTES
"    [facility]  [phone]          Assessment/Recommendations   Patient with known coronary artery disease with 55% stenosis in his LAD by previous heart catheterization.  He had a recent cerebrovascular accident and 70% plaque in his right coronary artery which was removed by carotid endarterectomy last month.  He is doing well and has limited residual, only some visual changes.    Will be more aggressive with his lipids and get his LDL below 70.  Will add Zetia 10 mg a day and recheck a fasting lipid panel in 3 months.    Blood pressures at home are not quite at goal and will recommend increasing amlodipine to 10 mg a day and asked him to call us with some blood pressure readings.  He will also follow-up with Dr. Cortez in [place].    Asked him to increase cardio exercise to 5 days a week which he will take under advisement but I think he will do.  Asked him to work on a Mediterranean diet and read the book \"the size of a long life\" which he bought in the past but is not ready.    His vascular surgeon said he can stop his Plavix so I agree that this could be discontinued as well.  30 minutes spent with chart review, patient visit, and documentation.     History of Present Illness/Subjective    Mr. Jarrod Mancera is a 73 year old male with known moderate coronary artery disease with LAD stenosis at 55% on previous coronary angiography.  He is had some minimal chest discomfort but did present with a stroke and ultimately had right carotid endarterectomy last month.  He has been feeling pretty well.  He could have recovered from his stroke with the exception of some visual changes and has a follow-up with a neurologist in the near future.    He denies orthopnea, paroxysmal nocturnal dyspnea, peripheral edema, syncope near syncope or palpitations.  Blood pressures at home are above goal at times.           Physical Examination Review of Systems   BP (!) 146/78 (BP Location: Right arm, Patient " "Position: Sitting, Cuff Size: Adult Regular)   Pulse 58   Resp 16   Ht 1.702 m (5' 7\")   Wt 69.4 kg (153 lb)   BMI 23.96 kg/m    Body mass index is 23.96 kg/m .  Wt Readings from Last 3 Encounters:   04/16/24 69.4 kg (153 lb)   03/21/24 70.8 kg (156 lb)   12/26/23 71.7 kg (158 lb)     General Appearance:   Alert, cooperative and in no acute distress.   ENT/Mouth: Pink/moist oral mucosa   EYES:  no scleral icterus, normal conjunctivae   Neck: JVP normal. No Hepatojugular reflux. Thyroid not visualized.   Chest/Lungs:   Lungs are clear to auscultation, equal chest wall expansion.   Cardiovascular:   S1, S2 without murmur ,clicks or rubs. Brachial, radial and posterior tibial pulses are intact and symetric. No carotid bruits noted   Abdomen:  Nontender. BS+.   Extremities: No cyanosis, clubbing or edema   Skin: no xanthelasma, warm.    Neurologic: normal arm movement bilateral, no tremors     Psychiatric: Appropriate affect.      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;    Family history of myocardial infarction     High cholesterol     Hypertension     Hypertension     MS (multiple sclerosis) (H)     Multiple sclerosis (H)     Past Surgical History:   Procedure Laterality Date    ARTHROSCOPY SHOULDER  01/01/1990    L Shoulder    COLONOSCOPY      2/2008; 3/25/2009; 10/30/2019--normal, no further screenings needed d/t adv age 77 when due    CV CORONARY ANGIOGRAM N/A 09/09/2021    Procedure: Coronary Angiogram;  Surgeon: Placido Ang MD;  Location: [facility] CV    CV FRACTIONAL FLOW RATIO WIRE N/A 09/09/2021    Procedure: Fractional Flow Ratio Wire;  Surgeon: Placido Ang MD;  Location: [facility] CATH LAB CV    CV LEFT " VENTRICULOGRAM N/A 2021    Procedure: Left Ventriculogram;  Surgeon: Placido Ang MD;  Location: [facility] CATH LAB CV    FLEXIBLE SIGMOIDOSCOPY  2004    HEAD & NECK SURGERY      [facility] @ [place]     HERNIA REPAIR  2006    KNEE SURGERY  1970    KNEE SURGERY Left 1970    LAPAROSCOPY  2005    POLYSOMNOGRAPHY  2018    SHOULDER SURGERY  1990    STRESS TEST      2007,2010    TONSILLECTOMY & ADENOIDECTOMY      no date    Family History   Problem Relation Age of Onset    Acute Myocardial Infarction Mother          at age: 70 years    Hypertension Mother     Heart Disease Mother     Acute Myocardial Infarction Father          at age: 52 years Cause of death: heart attack    Heart Disease Father     Myocardial Infarction Father     CABG Sister     No Known Problems Sister         age: 66 years    Ovarian Cancer Sister         +angioplaty=51years   at age: 60 years    Heart Disease Sister     Heart Disease Sister         ASHD, + Angioplasty=54 years  age: 76 years    Breast Cancer Other     Migraines Other     Multiple Sclerosis Other     Cancer No family hx of         no skin cancer    Social History     Socioeconomic History    Marital status:      Spouse name: Not on file    Number of children: Not on file    Years of education: Not on file    Highest education level: Not on file   Occupational History    Occupation: Retired    Tobacco Use    Smoking status: Former     Current packs/day: 0.00     Types: Cigarettes     Quit date: 1977     Years since quittin.7     Passive exposure: Past    Smokeless tobacco: Never   Vaping Use    Vaping status: Never Used   Substance and Sexual Activity    Alcohol use: Yes     Comment: 1 beer almost daily    Drug use: Not Currently     Types: Marijuana     Comment: on/off    Sexual activity: Not on file   Other Topics Concern    Parent/sibling w/ CABG, MI or angioplasty  before 65F 55M? Not Asked   Social History Narrative    Not on file     Social Determinants of Health     Financial Resource Strain: Low Risk  (12/25/2023)    Financial Resource Strain     Within the past 12 months, have you or your family members you live with been unable to get utilities (heat, electricity) when it was really needed?: No   Food Insecurity: Low Risk  (12/25/2023)    Food Insecurity     Within the past 12 months, did you worry that your food would run out before you got money to buy more?: No     Within the past 12 months, did the food you bought just not last and you didn t have money to get more?: No   Transportation Needs: Low Risk  (12/25/2023)    Transportation Needs     Within the past 12 months, has lack of transportation kept you from medical appointments, getting your medicines, non-medical meetings or appointments, work, or from getting things that you need?: No   Physical Activity: Not on file   Stress: Not on file   Social Connections: Unknown (3/12/2024)    Received from [facility], [facility]    Social Connections     Frequency of Communication with Friends and Family: Not on file   Interpersonal Safety: Not on file   Housing Stability: Low Risk  (12/25/2023)    Housing Stability     Do you have housing? : Yes     Are you worried about losing your housing?: No          Medications  Allergies   Current Outpatient Medications   Medication Sig Dispense Refill    amLODIPine (NORVASC) 10 MG tablet Take 1 tablet (10 mg) by mouth daily 90 tablet 3    aspirin 81 MG EC tablet Take 1 tablet (81 mg) by mouth daily      atorvastatin (LIPITOR) 80 MG tablet Take 1 tablet (80 mg) by mouth daily 90 tablet 2    baclofen (LIORESAL) 10 MG tablet Take 2 tablets (20 mg) by mouth 3 times daily 540 tablet 1    cholecalciferol (VITAMIN-D) 1000 UNIT capsule Take 3 capsules by mouth daily 90 capsule 3    citalopram (CELEXA) 10 MG tablet Take 1  tablet (10 mg) by mouth daily 90 tablet 1    clopidogrel (PLAVIX) 75 MG tablet Take 1 tablet (75 mg) by mouth daily 90 tablet 3    diphenhydrAMINE-acetaminophen (TYLENOL PM)  MG tablet Take 1 tablet by mouth nightly as needed for sleep      ezetimibe (ZETIA) 10 MG tablet Take 1 tablet (10 mg) by mouth daily 90 tablet 3    gabapentin (NEURONTIN) 300 MG capsule Take 3 capsules (900 mg) by mouth 3 times daily 810 capsule 1    levothyroxine (SYNTHROID/LEVOTHROID) 112 MCG tablet Take 1 tablet (112 mcg) by mouth daily 90 tablet 1    losartan (COZAAR) 50 MG tablet Take 1.5 tablets (75 mg) by mouth daily 135 tablet 1    Multiple Vitamin (DAILY-VITAMIN) TABS Take 1 tablet by mouth daily      tadalafil (CIALIS) 5 MG tablet Take 1-2 tabs every 48 hrs as needed for sexual activity 30 tablet 3    tamsulosin (FLOMAX) 0.4 MG capsule Take 0.4 mg by mouth daily      Allergies   Allergen Reactions    Codeine Sulfate Rash         Lab Results    Chemistry/lipid CBC Cardiac Enzymes/BNP/TSH/INR   Lab Results   Component Value Date    CHOL 164 09/20/2023    HDL 51 09/20/2023    TRIG 81 09/20/2023    BUN 29.3 (H) 09/20/2023     09/20/2023    CO2 26 09/20/2023    Lab Results   Component Value Date    WBC 6.1 09/09/2021    HGB 14.0 09/09/2021    HCT 41.7 09/09/2021    MCV 97 09/09/2021     09/09/2021    Lab Results   Component Value Date    TSH 3.73 09/20/2023

## 2024-04-17 ENCOUNTER — TELEPHONE (OUTPATIENT)
Dept: CARDIOLOGY | Facility: CLINIC | Age: 74
End: 2024-04-17
Payer: COMMERCIAL

## 2024-04-17 NOTE — TELEPHONE ENCOUNTER
Spoke with Pt regarding call back request, is not on a beta blocker. Will take losartan after stress echo.

## 2024-04-29 ENCOUNTER — TELEPHONE (OUTPATIENT)
Dept: CARDIOLOGY | Facility: CLINIC | Age: 74
End: 2024-04-29
Payer: COMMERCIAL

## 2024-04-29 NOTE — TELEPHONE ENCOUNTER
Left detailed message for Pt regarding vmm received. Pt reports bruising in various places on his person. Pt was to discontinue clopidogrel-mechelle

## 2024-04-30 ENCOUNTER — HOSPITAL ENCOUNTER (OUTPATIENT)
Dept: CARDIOLOGY | Facility: CLINIC | Age: 74
Discharge: HOME | End: 2024-04-30
Attending: INTERNAL MEDICINE | Admitting: INTERNAL MEDICINE
Payer: MEDICARE

## 2024-04-30 DIAGNOSIS — I10 ESSENTIAL HYPERTENSION: ICD-10-CM

## 2024-04-30 DIAGNOSIS — I10 BENIGN ESSENTIAL HYPERTENSION: ICD-10-CM

## 2024-04-30 DIAGNOSIS — I25.119 CORONARY ARTERY DISEASE INVOLVING NATIVE CORONARY ARTERY OF NATIVE HEART WITH ANGINA PECTORIS (H): ICD-10-CM

## 2024-04-30 DIAGNOSIS — I25.10 CORONARY ARTERY DISEASE INVOLVING NATIVE CORONARY ARTERY OF NATIVE HEART WITHOUT ANGINA PECTORIS: ICD-10-CM

## 2024-04-30 PROCEDURE — 93321 DOPPLER ECHO F-UP/LMTD STD: CPT | Mod: 26 | Performed by: INTERNAL MEDICINE

## 2024-04-30 PROCEDURE — 93325 DOPPLER ECHO COLOR FLOW MAPG: CPT | Mod: TC

## 2024-04-30 PROCEDURE — 93325 DOPPLER ECHO COLOR FLOW MAPG: CPT | Mod: 26 | Performed by: INTERNAL MEDICINE

## 2024-04-30 PROCEDURE — 93016 CV STRESS TEST SUPVJ ONLY: CPT | Performed by: INTERNAL MEDICINE

## 2024-04-30 PROCEDURE — 93018 CV STRESS TEST I&R ONLY: CPT | Performed by: INTERNAL MEDICINE

## 2024-04-30 PROCEDURE — 93350 STRESS TTE ONLY: CPT | Mod: 26 | Performed by: INTERNAL MEDICINE

## 2024-04-30 PROCEDURE — 93350 STRESS TTE ONLY: CPT | Mod: TC

## 2024-05-08 ENCOUNTER — OFFICE VISIT (OUTPATIENT)
Dept: FAMILY MEDICINE | Facility: CLINIC | Age: 74
End: 2024-05-08
Payer: COMMERCIAL

## 2024-05-08 VITALS
DIASTOLIC BLOOD PRESSURE: 74 MMHG | TEMPERATURE: 97.4 F | HEART RATE: 59 BPM | BODY MASS INDEX: 23.86 KG/M2 | SYSTOLIC BLOOD PRESSURE: 129 MMHG | HEIGHT: 67 IN | RESPIRATION RATE: 20 BRPM | WEIGHT: 152 LBS

## 2024-05-08 DIAGNOSIS — E06.3 HYPOTHYROIDISM DUE TO HASHIMOTO'S THYROIDITIS: ICD-10-CM

## 2024-05-08 DIAGNOSIS — I25.119 CORONARY ARTERY DISEASE INVOLVING NATIVE CORONARY ARTERY OF NATIVE HEART WITH ANGINA PECTORIS (H): ICD-10-CM

## 2024-05-08 DIAGNOSIS — R23.3 ABNORMAL BRUISING: Primary | ICD-10-CM

## 2024-05-08 DIAGNOSIS — C09.9 SQUAMOUS CELL CARCINOMA OF TONSIL (H): ICD-10-CM

## 2024-05-08 DIAGNOSIS — I10 ESSENTIAL HYPERTENSION: ICD-10-CM

## 2024-05-08 DIAGNOSIS — G35 MULTIPLE SCLEROSIS (H): ICD-10-CM

## 2024-05-08 DIAGNOSIS — I63.9 CEREBROVASCULAR ACCIDENT (CVA), UNSPECIFIED MECHANISM (H): ICD-10-CM

## 2024-05-08 DIAGNOSIS — F33.41 RECURRENT MAJOR DEPRESSIVE DISORDER, IN PARTIAL REMISSION (H): ICD-10-CM

## 2024-05-08 LAB
BASOPHILS # BLD AUTO: 0 10E3/UL (ref 0–0.2)
BASOPHILS NFR BLD AUTO: 1 %
EOSINOPHIL # BLD AUTO: 0.2 10E3/UL (ref 0–0.7)
EOSINOPHIL NFR BLD AUTO: 3 %
ERYTHROCYTE [DISTWIDTH] IN BLOOD BY AUTOMATED COUNT: 13 % (ref 10–15)
HCT VFR BLD AUTO: 39.9 % (ref 40–53)
HGB BLD-MCNC: 12.8 G/DL (ref 13.3–17.7)
IMM GRANULOCYTES # BLD: 0 10E3/UL
IMM GRANULOCYTES NFR BLD: 0 %
LYMPHOCYTES # BLD AUTO: 1.3 10E3/UL (ref 0.8–5.3)
LYMPHOCYTES NFR BLD AUTO: 26 %
MCH RBC QN AUTO: 31.8 PG (ref 26.5–33)
MCHC RBC AUTO-ENTMCNC: 32.1 G/DL (ref 31.5–36.5)
MCV RBC AUTO: 99 FL (ref 78–100)
MONOCYTES # BLD AUTO: 0.6 10E3/UL (ref 0–1.3)
MONOCYTES NFR BLD AUTO: 12 %
NEUTROPHILS # BLD AUTO: 2.8 10E3/UL (ref 1.6–8.3)
NEUTROPHILS NFR BLD AUTO: 58 %
PLATELET # BLD AUTO: 264 10E3/UL (ref 150–450)
RBC # BLD AUTO: 4.03 10E6/UL (ref 4.4–5.9)
WBC # BLD AUTO: 4.8 10E3/UL (ref 4–11)

## 2024-05-08 PROCEDURE — 36415 COLL VENOUS BLD VENIPUNCTURE: CPT | Performed by: INTERNAL MEDICINE

## 2024-05-08 PROCEDURE — 85025 COMPLETE CBC W/AUTO DIFF WBC: CPT | Mod: QW | Performed by: INTERNAL MEDICINE

## 2024-05-08 PROCEDURE — 99213 OFFICE O/P EST LOW 20 MIN: CPT | Performed by: INTERNAL MEDICINE

## 2024-05-08 RX ORDER — LEVOTHYROXINE SODIUM 112 UG/1
112 TABLET ORAL DAILY
Qty: 90 TABLET | Refills: 3 | Status: SHIPPED | OUTPATIENT
Start: 2024-05-08

## 2024-05-08 RX ORDER — ATORVASTATIN CALCIUM 80 MG/1
80 TABLET, FILM COATED ORAL DAILY
Qty: 90 TABLET | Refills: 3 | Status: SHIPPED | OUTPATIENT
Start: 2024-05-08

## 2024-05-08 RX ORDER — CLOPIDOGREL BISULFATE 75 MG/1
75 TABLET ORAL DAILY
COMMUNITY
Start: 2024-03-19 | End: 2024-07-10

## 2024-05-08 NOTE — ASSESSMENT & PLAN NOTE
- strong family h/o CAD   - on ASA 81mg daily + clopidogrel 75mg, atorvastatin 80mg qhs   - follows with Dr. Rubén Drummond (cardio)   - normal stress echo, 2019   - [facility] in summer, 2021 - no atherostenotic disease

## 2024-05-08 NOTE — PROGRESS NOTES
Assessment & Plan   Problem List Items Addressed This Visit          Nervous and Auditory    Multiple sclerosis (H)    Coronary artery disease involving native coronary artery of native heart with angina pectoris (H24)      - strong family h/o CAD   - on ASA 81mg daily + clopidogrel 75mg, atorvastatin 80mg qhs   - follows with Dr. Rubén Drummond (cardio)   - normal stress echo, 2019   - [facility] in summer, 2021 - no atherostenotic disease         Relevant Medications    atorvastatin (LIPITOR) 80 MG tablet    Stroke (cerebrum) (H)     3/2024: [facility] hospitalization for left sided hemiparesis, visual deficits - discovery of right watershed territory CVA  - in context of ~70% stenosis of right ICA - underwent expedited right carotid endarterectomy w/o complications  - started on DAPT - vascular recommendations for indefinite clopidogrel.  Not clear duration for DAPT            Respiratory    Squamous cell carcinoma of tonsil (H)       Circulatory    Essential hypertension     current antihypertensive regimen: losartan 50mg daily, amlodipine 10mg daily  regimen changes:   intolerance:   future titration/work-up plan:              Behavioral    Recurrent major depressive disorder, in partial remission (H24)     Other Visit Diagnoses       Abnormal bruising    -  Primary    normal plts, CBC - reassurance that this is common finding with DAPT    Relevant Orders    CBC with Platelets & Differential (Completed)    Hypothyroidism due to Hashimoto's thyroiditis        Relevant Medications    levothyroxine (SYNTHROID/LEVOTHROID) 112 MCG tablet                 Mejia Garay is a 73 year old, presenting for the following health issues: Complaints of bruisability concerns.  Recent stroke resulting in persistent visual field deficits, transient weakness which since improved.  Stroke workup notable for significant ipsilateral carotid stenosis; was advised undergo expedited right carotid endarterectomy -he did complete this recently  "without any perceived complications.  Was started on dual antiplatelet therapy.  Since that time is noticed increased bruisability.  Show several bruises on arms forearms, legs feet -some with remembered mild trauma other times with no perceived trauma.  No other bleeding complications including no epistaxis, hematuria, melena.  Feels well overall.  Leaving for planned trip to [place] this coming week.  Seeking reassurance before travel.  Bleeding/Bruising (Arms, hips and foot with unknown injury. )        5/8/2024     8:04 AM   Additional Questions   Roomed by Mai LUONG     History of Present Illness       Vascular Disease:  He presents for follow up of vascular disease.     He never takes nitroglycerin. He takes daily aspirin.    He eats 2-3 servings of fruits and vegetables daily.He consumes 0 sweetened beverage(s) daily.He exercises with enough effort to increase his heart rate 20 to 29 minutes per day.  He exercises with enough effort to increase his heart rate 6 days per week.   He is taking medications regularly.           Review of Systems  Constitutional, HEENT, cardiovascular, pulmonary, gi and gu systems are negative, except as otherwise noted.      Objective    /74   Pulse 59   Temp 97.4  F (36.3  C)   Resp 20   Ht 1.702 m (5' 7\")   Wt 68.9 kg (152 lb)   BMI 23.81 kg/m    Body mass index is 23.81 kg/m .  Physical Exam   GENERAL: alert and no distress  NECK: no adenopathy, no asymmetry, masses, or scars  RESP: lungs clear to auscultation - no rales, rhonchi or wheezes  CV: regular rate and rhythm, normal S1 S2, no S3 or S4, no murmur, click or rub, no peripheral edema  ABDOMEN: soft, nontender, no hepatosplenomegaly, no masses and bowel sounds normal  MS: no gross musculoskeletal defects noted, no edema  Skin: scattered ecchymoses on forearm surfaces, right shin - palpable resolving hematoma      Results for orders placed or performed in visit on 05/08/24 (from the past 24 hour(s))   CBC " with Platelets & Differential    Narrative    The following orders were created for panel order CBC with Platelets & Differential.  Procedure                               Abnormality         Status                     ---------                               -----------         ------                     CBC with platelets and d...[[id number]]  Abnormal            Final result                 Please view results for these tests on the individual orders.   CBC with platelets and differential   Result Value Ref Range    WBC Count 4.8 4.0 - 11.0 10e3/uL    RBC Count 4.03 (L) 4.40 - 5.90 10e6/uL    Hemoglobin 12.8 (L) 13.3 - 17.7 g/dL    Hematocrit 39.9 (L) 40.0 - 53.0 %    MCV 99 78 - 100 fL    MCH 31.8 26.5 - 33.0 pg    MCHC 32.1 31.5 - 36.5 g/dL    RDW 13.0 10.0 - 15.0 %    Platelet Count 264 150 - 450 10e3/uL    % Neutrophils 58 %    % Lymphocytes 26 %    % Monocytes 12 %    % Eosinophils 3 %    % Basophils 1 %    % Immature Granulocytes 0 %    Absolute Neutrophils 2.8 1.6 - 8.3 10e3/uL    Absolute Lymphocytes 1.3 0.8 - 5.3 10e3/uL    Absolute Monocytes 0.6 0.0 - 1.3 10e3/uL    Absolute Eosinophils 0.2 0.0 - 0.7 10e3/uL    Absolute Basophils 0.0 0.0 - 0.2 10e3/uL    Absolute Immature Granulocytes 0.0 <=0.4 10e3/uL           Signed Electronically by: Danyel Cortez MD

## 2024-05-08 NOTE — ASSESSMENT & PLAN NOTE
current antihypertensive regimen: losartan 50mg daily, amlodipine 10mg daily  regimen changes:   intolerance:   future titration/work-up plan:

## 2024-05-08 NOTE — ASSESSMENT & PLAN NOTE
3/2024: [facility] hospitalization for left sided hemiparesis, visual deficits - discovery of right watershed territory CVA  - in context of ~70% stenosis of right ICA - underwent expedited right carotid endarterectomy w/o complications  - started on DAPT - vascular recommendations for indefinite clopidogrel.  Not clear duration for DAPT

## 2024-05-15 ENCOUNTER — NURSE TRIAGE (OUTPATIENT)
Dept: FAMILY MEDICINE | Facility: CLINIC | Age: 74
End: 2024-05-15

## 2024-05-15 DIAGNOSIS — M25.551 HIP PAIN, RIGHT: Primary | ICD-10-CM

## 2024-05-15 RX ORDER — TRAMADOL HYDROCHLORIDE 50 MG/1
50 TABLET ORAL EVERY 6 HOURS PRN
Qty: 10 TABLET | Refills: 0 | Status: SHIPPED | OUTPATIENT
Start: 2024-05-15 | End: 2024-05-18

## 2024-05-15 NOTE — TELEPHONE ENCOUNTER
RN called to update patient that PCP is not in clinic.  RN advised that an appointment is required to assess/diagnose/treatment.    Patient states he (just needs something for the pain while on airplane).  Patient is flying out today to [place].    Appointment Scheduled today at 11:00 am.   Name band;

## 2024-05-15 NOTE — TELEPHONE ENCOUNTER
Spoke with patient and informed him that rx was sent in.  Appointment canceled for 11am with TFS.

## 2024-05-15 NOTE — TELEPHONE ENCOUNTER
Nurse Triage SBAR    Is this a 2nd Level Triage? YES, LICENSED PRACTITIONER REVIEW IS REQUIRED    Situation:   Pt is having right hip pain and is leaving to go on vacation to [place] at 1400 for 11 days. Pt is requesting pain medication from PCP to manage pain while abroad.    Background:   03/18/2024: Stroke   05/08/2024: OV for abnormal bruising  05/13/2024: Onset of hip pain    OTC: acetaminophen PRN, did not help. Stated they did not take ibuprofen as that is contraindicated with pt's other meds. Noted they can take it for 1-2 days max for acute pain.    Assessment:   Right hip pain, achy, sharp, intermittent, moderate pain, worsening, radiating into buttocks. Pt was unable to sleep last night due to pain. Denies recent injury or exercise to hip. Denies aggravating factors. Nothing improves pain.    Protocol Recommended Disposition:   See in Office Within 3 Days    Recommendation:   Pt is declining to be seen due to traveling at 1400 today. Pt is requesting pain medication for duration of trip, 11 days.     Routed to provider    Jordana Guevara RN    Reason for Disposition   MODERATE pain (e.g., interferes with normal activities, limping) and present > 3 days    Additional Information   Negative: Looks like a broken bone or dislocated joint (e.g., crooked or deformed)   Negative: Sounds like a life-threatening emergency to the triager   Negative: Followed a hip injury   Negative: Leg pain is main symptom   Negative: Back pain radiating (shooting) into hip   Negative: SEVERE pain (e.g., excruciating, unable to do any normal activities) and fever   Negative: Can't stand (bear weight) or walk   Negative: Fever and red area (or area very tender to touch)   Negative: Patient sounds very sick or weak to the triager   Negative: SEVERE pain (e.g., excruciating, unable to do any normal activities)   Negative: Red area or streak > 2 inches (or 5 cm)   Negative: Painful rash with multiple small blisters grouped  "together (i.e., dermatomal distribution or 'band' or 'stripe')   Negative: Looks like a boil, infected sore, deep ulcer, or other infected rash (spreading redness, pus)   Negative: Localized rash is very painful (no fever)   Negative: Numbness in a leg or foot (i.e., loss of sensation)   Negative: Patient wants to be seen    Answer Assessment - Initial Assessment Questions  1. LOCATION and RADIATION: \"Where is the pain located?\"       Hip, right  2. QUALITY: \"What does the pain feel like?\"  (e.g., sharp, dull, aching, burning)      Achy, sharp  3. SEVERITY: \"How bad is the pain?\" \"What does it keep you from doing?\"   (Scale 1-10; or mild, moderate, severe)    -  MILD (1-3): doesn't interfere with normal activities     -  MODERATE (4-7): interferes with normal activities (e.g., work or school) or awakens from sleep, limping     -  SEVERE (8-10): excruciating pain, unable to do any normal activities, unable to walk      moderate  4. ONSET: \"When did the pain start?\" \"Does it come and go, or is it there all the time?\"     Comes and goes  5. WORK OR EXERCISE: \"Has there been any recent work or exercise that involved this part of the body?\"       Denies   6. CAUSE: \"What do you think is causing the hip pain?\"       Unsure   7. AGGRAVATING FACTORS: \"What makes the hip pain worse?\" (e.g., walking, climbing stairs, running)      Nothing   8. OTHER SYMPTOMS: \"Do you have any other symptoms?\" (e.g., back pain, pain shooting down leg,  fever, rash)      Denies    Protocols used: Hip Pain-A-OH    "

## 2024-05-20 ENCOUNTER — TELEPHONE (OUTPATIENT)
Dept: FAMILY MEDICINE | Facility: CLINIC | Age: 74
End: 2024-05-20
Payer: COMMERCIAL

## 2024-05-20 NOTE — TELEPHONE ENCOUNTER
Patient is currently in [place].  Patient has CPAP machine  Patient is unable to get distilled water.    Patient with questions if alternative for humidity or if best to not use CPAP    RN unable to (triage) as [place] of [place].

## 2024-05-21 NOTE — TELEPHONE ENCOUNTER
Called and gave message below from Dr. Cortez. He states understanding, no questions at this time.

## 2024-07-10 DIAGNOSIS — E06.3 HYPOTHYROIDISM DUE TO HASHIMOTO'S THYROIDITIS: ICD-10-CM

## 2024-07-10 DIAGNOSIS — I63.9 CEREBROVASCULAR ACCIDENT (CVA), UNSPECIFIED MECHANISM (H): Primary | ICD-10-CM

## 2024-07-10 RX ORDER — CLOPIDOGREL BISULFATE 75 MG/1
TABLET ORAL
Qty: 90 TABLET | Refills: 3 | Status: SHIPPED | OUTPATIENT
Start: 2024-07-10

## 2024-07-10 RX ORDER — LEVOTHYROXINE SODIUM 112 UG/1
TABLET ORAL
Qty: 90 TABLET | Refills: 1 | OUTPATIENT
Start: 2024-07-10

## 2024-07-16 ENCOUNTER — LAB (OUTPATIENT)
Dept: LAB | Facility: CLINIC | Age: 74
End: 2024-07-16
Payer: COMMERCIAL

## 2024-07-16 DIAGNOSIS — I25.10 CORONARY ARTERY DISEASE INVOLVING NATIVE CORONARY ARTERY OF NATIVE HEART WITHOUT ANGINA PECTORIS: ICD-10-CM

## 2024-07-16 DIAGNOSIS — I25.119 CORONARY ARTERY DISEASE INVOLVING NATIVE CORONARY ARTERY OF NATIVE HEART WITH ANGINA PECTORIS (H): ICD-10-CM

## 2024-07-16 DIAGNOSIS — I10 ESSENTIAL HYPERTENSION: ICD-10-CM

## 2024-07-16 DIAGNOSIS — I10 BENIGN ESSENTIAL HYPERTENSION: ICD-10-CM

## 2024-07-16 LAB
CHOLEST SERPL-MCNC: 138 MG/DL
FASTING STATUS PATIENT QL REPORTED: NORMAL
HDLC SERPL-MCNC: 44 MG/DL
LDLC SERPL CALC-MCNC: 75 MG/DL
NONHDLC SERPL-MCNC: 94 MG/DL
TRIGL SERPL-MCNC: 97 MG/DL

## 2024-07-16 PROCEDURE — 80061 LIPID PANEL: CPT

## 2024-07-16 PROCEDURE — 36415 COLL VENOUS BLD VENIPUNCTURE: CPT

## 2024-07-25 DIAGNOSIS — I25.10 CORONARY ARTERY DISEASE INVOLVING NATIVE CORONARY ARTERY OF NATIVE HEART WITHOUT ANGINA PECTORIS: Primary | ICD-10-CM

## 2024-07-25 DIAGNOSIS — E78.00 PURE HYPERCHOLESTEROLEMIA: ICD-10-CM

## 2024-07-25 RX ORDER — EVOLOCUMAB 140 MG/ML
140 INJECTION, SOLUTION SUBCUTANEOUS
Qty: 6 ML | Refills: 3 | Status: SHIPPED | OUTPATIENT
Start: 2024-07-25

## 2024-08-05 ENCOUNTER — TELEPHONE (OUTPATIENT)
Dept: CARDIOLOGY | Facility: CLINIC | Age: 74
End: 2024-08-05
Payer: COMMERCIAL

## 2024-08-05 NOTE — TELEPHONE ENCOUNTER
Pt called in, confirmed repatha submitted to pharmacy- had not received a PA from them at this time. Pt discussed online research he has conducted regarding the side effects of Repatha. Discussed in general terms. Pt will trial medication and notify clinic of side effects.-mechelle

## 2024-08-06 NOTE — TELEPHONE ENCOUNTER
PA Initiation    Medication: REPATHA 140 MG/ML SC Zumbox  Insurance Company: [employer] Non-Specialty PA's - Phone [phone] Fax [phone]  Pharmacy Filling the Rx: [place] [address] [facility]  Filling Pharmacy Phone: [phone]  Filling Pharmacy Fax:    Start Date: 8/6/2024

## 2024-08-06 NOTE — TELEPHONE ENCOUNTER
Recvd vmm from Pt that he spoke with pharmacy whom advised Pt a request for PA was sent to our clinic. Confirmed PA is needed through cover my meds.    KEY [id number]    Notified PT and sent for processing-mechelle

## 2024-08-12 NOTE — TELEPHONE ENCOUNTER
Prior Authorization Approval    Medication: REPATHA 140 MG/ML SC SOSY  Authorization Effective Date: 7/13/2024  Authorization Expiration Date: 8/12/2025  Approved Dose/Quantity: 2/28  Reference #: [id number]   Insurance Company: [employer] Non-Specialty PA's - Phone [phone] Fax [phone]  Expected CoPay: $  532  CoPay Card Available:      Financial Assistance Needed:   Which Pharmacy is filling the prescription: [address]  Pharmacy Notified: Yes  Patient Notified: Yes

## 2024-08-15 ENCOUNTER — TELEPHONE (OUTPATIENT)
Dept: CARDIOLOGY | Facility: CLINIC | Age: 74
End: 2024-08-15
Payer: COMMERCIAL

## 2024-08-15 DIAGNOSIS — I25.10 CORONARY ARTERY DISEASE INVOLVING NATIVE CORONARY ARTERY OF NATIVE HEART WITHOUT ANGINA PECTORIS: ICD-10-CM

## 2024-08-15 DIAGNOSIS — Z98.890 H/O CAROTID ENDARTERECTOMY: Primary | ICD-10-CM

## 2024-08-15 DIAGNOSIS — I63.9 STROKE (CEREBRUM) (H): ICD-10-CM

## 2024-08-15 NOTE — TELEPHONE ENCOUNTER
Pt called in, he is wondering if we would order the repeated carotid ultrasound, he was having them done every 6 months with [facility] and would prefer doing so within our group.. hx of carotid endarterectomy R side 03/2024. Will discuss with Dr. Ines Lang approved and will begin medication- repeat lipid panel in 3 months.

## 2024-08-16 NOTE — TELEPHONE ENCOUNTER
----- Message -----  From: Tramaine Drummond MD  Sent: 8/16/2024   7:02 AM CDT  To: Jennyfer Burger,    We can get ultrasound of carotids 6 months after procedure.    Tramaine

## 2024-08-21 NOTE — TELEPHONE ENCOUNTER
Spoke with Pt regarding recommendations from Dr. Drummond, Pt expressed understanding. Provided scheduling info. He confirmed started on Repatha 08/19 and will recheck a fasting lipid in 3 months time at . -mechelle

## 2024-08-30 ENCOUNTER — TELEPHONE (OUTPATIENT)
Dept: CARDIOLOGY | Facility: CLINIC | Age: 74
End: 2024-08-30
Payer: COMMERCIAL

## 2024-08-30 NOTE — TELEPHONE ENCOUNTER
Pt called in, he had appt with his nephrologist whom suggested he have a ctca based on the results of the carotid ultrasound. Advise Pt has had ctca prior to cath 2021. He reports some ongoing fluttering and a ziopatch monitor was ordered by provider. Will await these results. Pt will notify clinic of any new or worsening symptoms-mechelle

## 2024-08-31 ENCOUNTER — TRANSFERRED RECORDS (OUTPATIENT)
Dept: HEALTH INFORMATION MANAGEMENT | Facility: CLINIC | Age: 74
End: 2024-08-31

## 2024-09-16 ENCOUNTER — TRANSFERRED RECORDS (OUTPATIENT)
Dept: HEALTH INFORMATION MANAGEMENT | Facility: CLINIC | Age: 74
End: 2024-09-16
Payer: COMMERCIAL

## 2024-09-19 ENCOUNTER — NURSE TRIAGE (OUTPATIENT)
Dept: FAMILY MEDICINE | Facility: CLINIC | Age: 74
End: 2024-09-19
Payer: COMMERCIAL

## 2024-09-19 NOTE — TELEPHONE ENCOUNTER
S-(situation): Covid questions.    B-(background): Pt states he was with a bunch of friends over the weekend.  A-(assessment): Pt states he began having cold like symptoms yesterday. Nasal congestion, headache, cough. Denies breathing difficulty. Has not checked temperature.     R-(recommendations): Pt will take home covid test. He will call if positive result. Discussed home cares. Pt will call with questions or symptoms.   Additional Information   Negative: COVID-19 lab test positive   Negative: Influenza (flu) exposure, questions about   Negative: [1] Symptoms of COVID-19 (e.g., cough, fever, SOB, or others) AND [2] doctor (or NP/PA) suspected COVID-19 based on symptoms   Negative: [1] Symptoms of COVID-19 (e.g., cough, fever, SOB, or others) AND [2] within 14 days of COVID-19 EXPOSURE   Negative: [1] Symptoms of COVID-19 (e.g., cough, fever, SOB, or others) AND [2] COVID-19 is widespread in the community   Negative: [1] Difficulty breathing (shortness of breath) occurs AND [2] onset > 14 days after COVID-19 EXPOSURE   Negative: [1] Dry cough occurs AND [2] onset > 14 days after COVID-19 EXPOSURE   Negative: [1] Wet cough (i.e., white-yellow, yellow, green, or tomeka colored sputum) AND [2] onset > 14 days after COVID-19 EXPOSURE   Negative: [1] Common cold symptoms AND [2] onset > 14 days after COVID-19 EXPOSURE   Negative: COVID-19 vaccine reaction suspected (e.g., fever, headache, muscle aches) occurring during days 1 to 3 after getting vaccine   Negative: COVID-19 vaccine, questions about    Protocols used: COVID-19 - Exposure-A-

## 2024-09-20 NOTE — TELEPHONE ENCOUNTER
Patient calling back today to report that he is negative for Covid on his home test. Still inquiring about Paxlovid, and would like to retest this weekend and if positive would like to get Paxlovid sent in. RN advised UC over weekend if symptoms persist. Patient agrees with plan. Denies any sob, chest pain, difficulty breathing at this time, advised ER if red flag symptoms develop.

## 2024-09-25 DIAGNOSIS — G47.33 OBSTRUCTIVE SLEEP APNEA (ADULT) (PEDIATRIC): Primary | ICD-10-CM

## 2024-10-01 DIAGNOSIS — E78.00 PURE HYPERCHOLESTEROLEMIA: ICD-10-CM

## 2024-10-02 RX ORDER — LOSARTAN POTASSIUM 50 MG/1
TABLET ORAL
Qty: 135 TABLET | Refills: 3 | Status: SHIPPED | OUTPATIENT
Start: 2024-10-02

## 2024-10-31 ENCOUNTER — HOSPITAL ENCOUNTER (OUTPATIENT)
Dept: ULTRASOUND IMAGING | Facility: CLINIC | Age: 74
Discharge: HOME | End: 2024-10-31
Attending: INTERNAL MEDICINE | Admitting: INTERNAL MEDICINE
Payer: MEDICARE

## 2024-10-31 DIAGNOSIS — I25.10 CORONARY ARTERY DISEASE INVOLVING NATIVE CORONARY ARTERY OF NATIVE HEART WITHOUT ANGINA PECTORIS: ICD-10-CM

## 2024-10-31 DIAGNOSIS — Z98.890 H/O CAROTID ENDARTERECTOMY: ICD-10-CM

## 2024-10-31 DIAGNOSIS — I63.9 STROKE (CEREBRUM) (H): ICD-10-CM

## 2024-10-31 PROCEDURE — 93880 EXTRACRANIAL BILAT STUDY: CPT

## 2024-11-01 ENCOUNTER — DOCUMENTATION ONLY (OUTPATIENT)
Dept: FAMILY MEDICINE | Facility: CLINIC | Age: 74
End: 2024-11-01
Payer: COMMERCIAL

## 2024-11-01 DIAGNOSIS — G35 MULTIPLE SCLEROSIS (H): ICD-10-CM

## 2024-11-01 DIAGNOSIS — I25.119 CORONARY ARTERY DISEASE INVOLVING NATIVE CORONARY ARTERY OF NATIVE HEART WITH ANGINA PECTORIS (H): ICD-10-CM

## 2024-11-01 DIAGNOSIS — E78.00 PURE HYPERCHOLESTEROLEMIA: ICD-10-CM

## 2024-11-01 DIAGNOSIS — I10 ESSENTIAL HYPERTENSION: Primary | ICD-10-CM

## 2024-11-08 ENCOUNTER — DOCUMENTATION ONLY (OUTPATIENT)
Dept: FAMILY MEDICINE | Facility: CLINIC | Age: 74
End: 2024-11-08

## 2024-11-08 ENCOUNTER — LAB (OUTPATIENT)
Dept: LAB | Facility: CLINIC | Age: 74
End: 2024-11-08
Payer: COMMERCIAL

## 2024-11-08 DIAGNOSIS — Z12.5 SCREENING FOR PROSTATE CANCER: Primary | ICD-10-CM

## 2024-11-08 DIAGNOSIS — G35 MULTIPLE SCLEROSIS (H): ICD-10-CM

## 2024-11-08 DIAGNOSIS — I25.10 CORONARY ARTERY DISEASE INVOLVING NATIVE CORONARY ARTERY OF NATIVE HEART WITHOUT ANGINA PECTORIS: ICD-10-CM

## 2024-11-08 DIAGNOSIS — Z12.5 SCREENING FOR PROSTATE CANCER: ICD-10-CM

## 2024-11-08 DIAGNOSIS — I10 ESSENTIAL HYPERTENSION: ICD-10-CM

## 2024-11-08 DIAGNOSIS — E78.00 PURE HYPERCHOLESTEROLEMIA: ICD-10-CM

## 2024-11-08 LAB
ANION GAP SERPL CALCULATED.3IONS-SCNC: 10 MMOL/L (ref 7–15)
BASOPHILS # BLD AUTO: 0 10E3/UL (ref 0–0.2)
BASOPHILS NFR BLD AUTO: 0 %
BUN SERPL-MCNC: 23.7 MG/DL (ref 8–23)
CALCIUM SERPL-MCNC: 9.3 MG/DL (ref 8.8–10.4)
CHLORIDE SERPL-SCNC: 105 MMOL/L (ref 98–107)
CHOLEST SERPL-MCNC: 94 MG/DL
CREAT SERPL-MCNC: 1.25 MG/DL (ref 0.67–1.17)
EGFRCR SERPLBLD CKD-EPI 2021: 61 ML/MIN/1.73M2
EOSINOPHIL # BLD AUTO: 0.1 10E3/UL (ref 0–0.7)
EOSINOPHIL NFR BLD AUTO: 2 %
ERYTHROCYTE [DISTWIDTH] IN BLOOD BY AUTOMATED COUNT: 13.6 % (ref 10–15)
FASTING STATUS PATIENT QL REPORTED: YES
FASTING STATUS PATIENT QL REPORTED: YES
GLUCOSE SERPL-MCNC: 82 MG/DL (ref 70–99)
HCO3 SERPL-SCNC: 27 MMOL/L (ref 22–29)
HCT VFR BLD AUTO: 40.8 % (ref 40–53)
HDLC SERPL-MCNC: 60 MG/DL
HGB BLD-MCNC: 13.1 G/DL (ref 13.3–17.7)
IMM GRANULOCYTES # BLD: 0 10E3/UL
IMM GRANULOCYTES NFR BLD: 0 %
LDLC SERPL CALC-MCNC: 26 MG/DL
LYMPHOCYTES # BLD AUTO: 1.5 10E3/UL (ref 0.8–5.3)
LYMPHOCYTES NFR BLD AUTO: 30 %
MCH RBC QN AUTO: 31.9 PG (ref 26.5–33)
MCHC RBC AUTO-ENTMCNC: 32.1 G/DL (ref 31.5–36.5)
MCV RBC AUTO: 99 FL (ref 78–100)
MONOCYTES # BLD AUTO: 0.5 10E3/UL (ref 0–1.3)
MONOCYTES NFR BLD AUTO: 10 %
NEUTROPHILS # BLD AUTO: 2.9 10E3/UL (ref 1.6–8.3)
NEUTROPHILS NFR BLD AUTO: 57 %
NONHDLC SERPL-MCNC: 34 MG/DL
PLATELET # BLD AUTO: 238 10E3/UL (ref 150–450)
POTASSIUM SERPL-SCNC: 4.3 MMOL/L (ref 3.4–5.3)
PSA SERPL DL<=0.01 NG/ML-MCNC: 1.72 NG/ML (ref 0–6.5)
RBC # BLD AUTO: 4.11 10E6/UL (ref 4.4–5.9)
SODIUM SERPL-SCNC: 142 MMOL/L (ref 135–145)
TRIGL SERPL-MCNC: 38 MG/DL
WBC # BLD AUTO: 5 10E3/UL (ref 4–11)

## 2024-11-08 PROCEDURE — 80048 BASIC METABOLIC PNL TOTAL CA: CPT

## 2024-11-08 PROCEDURE — G0103 PSA SCREENING: HCPCS

## 2024-11-08 PROCEDURE — 80061 LIPID PANEL: CPT

## 2024-11-08 PROCEDURE — 36415 COLL VENOUS BLD VENIPUNCTURE: CPT

## 2024-11-08 PROCEDURE — 85025 COMPLETE CBC W/AUTO DIFF WBC: CPT | Mod: QW

## 2024-11-08 NOTE — PROGRESS NOTES
Good morning,    Pt is requesting to have a PSA added on to his lab work from this morning. If needed could you do that for me please?    Thank you,    Kristi Trinidad MLT

## 2024-11-10 SDOH — HEALTH STABILITY: PHYSICAL HEALTH: ON AVERAGE, HOW MANY DAYS PER WEEK DO YOU ENGAGE IN MODERATE TO STRENUOUS EXERCISE (LIKE A BRISK WALK)?: 6 DAYS

## 2024-11-10 SDOH — HEALTH STABILITY: PHYSICAL HEALTH: ON AVERAGE, HOW MANY MINUTES DO YOU ENGAGE IN EXERCISE AT THIS LEVEL?: 30 MIN

## 2024-11-10 ASSESSMENT — SOCIAL DETERMINANTS OF HEALTH (SDOH): HOW OFTEN DO YOU GET TOGETHER WITH FRIENDS OR RELATIVES?: THREE TIMES A WEEK

## 2024-11-15 ENCOUNTER — OFFICE VISIT (OUTPATIENT)
Dept: FAMILY MEDICINE | Facility: CLINIC | Age: 74
End: 2024-11-15
Payer: COMMERCIAL

## 2024-11-15 VITALS
WEIGHT: 145 LBS | RESPIRATION RATE: 18 BRPM | HEIGHT: 67 IN | BODY MASS INDEX: 22.76 KG/M2 | SYSTOLIC BLOOD PRESSURE: 127 MMHG | DIASTOLIC BLOOD PRESSURE: 67 MMHG | OXYGEN SATURATION: 98 % | HEART RATE: 65 BPM

## 2024-11-15 DIAGNOSIS — I10 ESSENTIAL HYPERTENSION: ICD-10-CM

## 2024-11-15 DIAGNOSIS — I10 BENIGN ESSENTIAL HYPERTENSION: ICD-10-CM

## 2024-11-15 DIAGNOSIS — E89.0 POSTOPERATIVE HYPOTHYROIDISM: Primary | ICD-10-CM

## 2024-11-15 DIAGNOSIS — Z00.00 HEALTH CARE MAINTENANCE: ICD-10-CM

## 2024-11-15 DIAGNOSIS — F33.41 RECURRENT MAJOR DEPRESSIVE DISORDER, IN PARTIAL REMISSION (H): ICD-10-CM

## 2024-11-15 DIAGNOSIS — G35 MULTIPLE SCLEROSIS (H): ICD-10-CM

## 2024-11-15 DIAGNOSIS — I25.119 CORONARY ARTERY DISEASE INVOLVING NATIVE CORONARY ARTERY OF NATIVE HEART WITH ANGINA PECTORIS (H): ICD-10-CM

## 2024-11-15 DIAGNOSIS — E06.3 HYPOTHYROIDISM DUE TO HASHIMOTO'S THYROIDITIS: ICD-10-CM

## 2024-11-15 DIAGNOSIS — N52.9 ERECTILE DYSFUNCTION, UNSPECIFIED ERECTILE DYSFUNCTION TYPE: ICD-10-CM

## 2024-11-15 DIAGNOSIS — Z85.21 HISTORY OF LARYNGEAL CANCER: ICD-10-CM

## 2024-11-15 DIAGNOSIS — I63.9 CEREBROVASCULAR ACCIDENT (CVA), UNSPECIFIED MECHANISM (H): ICD-10-CM

## 2024-11-15 RX ORDER — AMLODIPINE BESYLATE 10 MG/1
10 TABLET ORAL DAILY
Qty: 90 TABLET | Refills: 3 | Status: SHIPPED | OUTPATIENT
Start: 2024-11-15

## 2024-11-15 RX ORDER — ATORVASTATIN CALCIUM 80 MG/1
80 TABLET, FILM COATED ORAL DAILY
Qty: 90 TABLET | Refills: 3 | Status: SHIPPED | OUTPATIENT
Start: 2024-11-15

## 2024-11-15 RX ORDER — LEVOTHYROXINE SODIUM 112 UG/1
112 TABLET ORAL DAILY
Qty: 90 TABLET | Refills: 3 | Status: SHIPPED | OUTPATIENT
Start: 2024-11-15

## 2024-11-15 RX ORDER — TADALAFIL 5 MG/1
TABLET ORAL
Qty: 30 TABLET | Refills: 3 | Status: SHIPPED | OUTPATIENT
Start: 2024-11-15

## 2024-11-15 RX ORDER — BACLOFEN 10 MG/1
20 TABLET ORAL 3 TIMES DAILY
Qty: 540 TABLET | Refills: 3 | Status: SHIPPED | OUTPATIENT
Start: 2024-11-15

## 2024-11-15 RX ORDER — GABAPENTIN 300 MG/1
900 CAPSULE ORAL 3 TIMES DAILY
Qty: 810 CAPSULE | Refills: 3 | Status: SHIPPED | OUTPATIENT
Start: 2024-11-15

## 2024-11-15 ASSESSMENT — PATIENT HEALTH QUESTIONNAIRE - PHQ9
SUM OF ALL RESPONSES TO PHQ QUESTIONS 1-9: 1
SUM OF ALL RESPONSES TO PHQ QUESTIONS 1-9: 1
10. IF YOU CHECKED OFF ANY PROBLEMS, HOW DIFFICULT HAVE THESE PROBLEMS MADE IT FOR YOU TO DO YOUR WORK, TAKE CARE OF THINGS AT HOME, OR GET ALONG WITH OTHER PEOPLE: NOT DIFFICULT AT ALL

## 2024-11-15 NOTE — ASSESSMENT & PLAN NOTE
3/2024: ANW hospitalization for left sided hemiparesis, visual deficits - discovery of right watershed territory CVA  - in context of ~70% stenosis of right ICA - underwent expedited right carotid endarterectomy w/o complications  - started on DAPT - vascular recommendations for indefinite clopidogrel

## 2024-11-15 NOTE — ASSESSMENT & PLAN NOTE
- strong family h/o CAD   - clopidogrel 75mg, atorvastatin 80mg qhs   - follows with Dr. Rubén Drummond (cardio)   - normal stress echo, 2019   - [facility] in summer, 2021 - no atherostenotic disease

## 2024-11-15 NOTE — ASSESSMENT & PLAN NOTE
relapsing/remitting - follows with neurology   - previously on glatiramer    - takes baclofen for spasticity - well controlled

## 2024-11-15 NOTE — ASSESSMENT & PLAN NOTE
resected in '11 - follows through [facility] remotely   - HPV related   '24: Plan [facility] reevaluation near future

## 2024-11-15 NOTE — ASSESSMENT & PLAN NOTE
- CRC due '29   - continue annual PSA testing   - expected grieving with passing of wife in summer, 2020 (lung cancer)   -Long-term relationship with new girlfriend   -Vaccinations discussed and updated accordingly   - cialis prn

## 2024-11-15 NOTE — PROGRESS NOTES
Preventive Care Visit  [facility]  Danyel Cortez MD, Internal Medicine  Nov 15, 2024      Assessment & Plan   Problem List Items Addressed This Visit          Nervous and Auditory    Multiple sclerosis (H)     relapsing/remitting - follows with neurology   - previously on glatiramer    - takes baclofen for spasticity - well controlled         Relevant Medications    baclofen (LIORESAL) 10 MG tablet    gabapentin (NEURONTIN) 300 MG capsule    Coronary artery disease involving native coronary artery of native heart with angina pectoris (H)      - strong family h/o CAD   - clopidogrel 75mg, atorvastatin 80mg qhs   - follows with Dr. Rubén Drummond (cardio)   - normal stress echo, 2019   - [facility] in summer, 2021 - no atherostenotic disease         Relevant Medications    atorvastatin (LIPITOR) 80 MG tablet    Stroke (cerebrum) (H)     3/2024: ANW hospitalization for left sided hemiparesis, visual deficits - discovery of right watershed territory CVA  - in context of ~70% stenosis of right ICA - underwent expedited right carotid endarterectomy w/o complications  - started on DAPT - vascular recommendations for indefinite clopidogrel            Respiratory    History of laryngeal cancer     resected in '11 - follows through [facility] remotely   - HPV related   '24: Plan [facility] reevaluation near future            Endocrine    Postoperative hypothyroidism - Primary    Relevant Medications    levothyroxine (SYNTHROID/LEVOTHROID) 112 MCG tablet       Circulatory    Essential hypertension     current antihypertensive regimen: losartan 50mg daily, amlodipine 10mg daily  regimen changes:   intolerance:   future titration/work-up plan:              Behavioral    Recurrent major depressive disorder, in partial remission (H)     Previously on citalopram 20mg daily  - stopped given perceived remission            Other    Health care maintenance     - CRC due '29   - continue annual PSA testing   -  expected grieving with passing of wife in summer, 2020 (lung cancer)   -Long-term relationship with new girlfriend   -Vaccinations discussed and updated accordingly   - cialis prn         Relevant Orders    REVIEW OF HEALTH MAINTENANCE PROTOCOL ORDERS (Completed)     Other Visit Diagnoses       Erectile dysfunction, unspecified erectile dysfunction type        Relevant Medications    tadalafil (CIALIS) 5 MG tablet    Hypothyroidism due to Hashimoto's thyroiditis        Relevant Medications    levothyroxine (SYNTHROID/LEVOTHROID) 112 MCG tablet    Benign essential hypertension        Relevant Medications    amLODIPine (NORVASC) 10 MG tablet                Counseling  Appropriate preventive services were addressed with this patient via screening, questionnaire, or discussion as appropriate for fall prevention, nutrition, physical activity, Tobacco-use cessation, social engagement, weight loss and cognition.  Checklist reviewing preventive services available has been given to the patient.  Reviewed patient's diet, addressing concerns and/or questions.     FUTURE APPOINTMENTS:       - Follow-up visit in 12 months      Mejia Garay is a 74 year old, presenting for the following: Returns for regular follow-up.  Recently returned from [place] with his girlfriend -good overall experience.  No significant health complications.  Has remained on dual antiplatelet therapy since his carotid endarterectomy earlier this year.  No real complications or sequelae from stroke at that time.  Now on Repatha in addition to high-dose atorvastatin -has now seen cholesterol levels at goal.  Blood pressures well-controlled.  Has follow-up through [facility] for surveillance imaging related to remote history of laryngeal cancer  Medicare Visit        11/15/2024    12:18 PM   Additional Questions   Roomed by Mai CAMPOS    Health Care Directive  Patient does not have a Health Care Directive: Patient states has Advance Directive and  will bring in a copy to clinic.      11/10/2024   General Health   How would you rate your overall physical health? Good   Feel stress (tense, anxious, or unable to sleep) Only a little      (!) STRESS CONCERN      11/10/2024   Nutrition   Diet: Low salt    Low fat/cholesterol       Multiple values from one day are sorted in reverse-chronological order         11/10/2024   Exercise   Days per week of moderate/strenous exercise 6 days   Average minutes spent exercising at this level 30 min            11/10/2024   Social Factors   Frequency of gathering with friends or relatives Three times a week   Worry food won't last until get money to buy more No   Food not last or not have enough money for food? No   Do you have housing? (Housing is defined as stable permanent housing and does not include staying ouside in a car, in a tent, in an abandoned building, in an overnight shelter, or couch-surfing.) Yes   Are you worried about losing your housing? No   Lack of transportation? No   Unable to get utilities (heat,electricity)? No            11/10/2024   Fall Risk   Fallen 2 or more times in the past year? No    Trouble with walking or balance? No        Patient-reported          11/10/2024   Activities of Daily Living- Home Safety   Needs help with the following daily activites None of the above   Safety concerns in the home None of the above            11/10/2024   Dental   Dentist two times every year? Yes            11/10/2024   Hearing Screening   Hearing concerns? None of the above            11/10/2024   Driving Risk Screening   Patient/family members have concerns about driving No            11/10/2024   General Alertness/Fatigue Screening   Have you been more tired than usual lately? No            11/10/2024   Urinary Incontinence Screening   Bothered by leaking urine in past 6 months No            11/10/2024   TB Screening   Were you born outside of the [place]? No          Today's PHQ-9 Score:       11/15/2024     12:12 PM   PHQ-9 SCORE   PHQ-9 Total Score MyChart 1 (Minimal depression)   PHQ-9 Total Score 1        Patient-reported         11/10/2024   Substance Use   Alcohol more than 3/day or more than 7/wk No   Do you have a current opioid prescription? No   How severe/bad is pain from 1 to 10? 3/10   Do you use any other substances recreationally? (!) CANNABIS PRODUCTS        Social History     Tobacco Use    Smoking status: Former     Current packs/day: 0.00     Average packs/day: 0.5 packs/day for 8.6 years (4.3 ttl pk-yrs)     Types: Cigarettes     Start date: 1969     Quit date: 1977     Years since quittin.3     Passive exposure: Past    Smokeless tobacco: Former     Quit date: 7/15/1975   Vaping Use    Vaping status: Never Used   Substance Use Topics    Alcohol use: Yes     Comment: 1 beer almost daily    Drug use: Yes     Types: Marijuana     Comment: on/off       ASCVD Risk   The ASCVD Risk score (Roberto Carlos GARCIA, et al., 2019) failed to calculate for the following reasons:    Risk score cannot be calculated because patient has a medical history suggesting prior/existing ASCVD          Reviewed and updated as needed this visit by Provider                    Current providers sharing in care for this patient include:  Patient Care Team:  Danyel Cortez MD as PCP - General (HOSPITALIST)  Tramaine Drummond MD as Assigned Heart and Vascular Provider  Tramaine Drummond MD as MD (Cardiovascular Disease)  Danyel Cortez MD as Assigned PCP    The following health maintenance items are reviewed in Epic and correct as of today:  Health Maintenance   Topic Date Due    DEPRESSION ACTION PLAN  Never done    RSV VACCINE (1 - Risk 60-74 years 1-dose series) Never done    AORTIC ANEURYSM SCREENING (SYSTEM ASSIGNED)  Never done    DTAP/TDAP/TD IMMUNIZATION (3 - Td or Tdap) 2024    ANNUAL REVIEW OF HM ORDERS  2024    INFLUENZA VACCINE (1) 2024    COVID-19 Vaccine ( - - season)  "09/01/2024    TSH W/FREE T4 REFLEX  09/20/2024    MEDICARE ANNUAL WELLNESS VISIT  10/18/2024    PHQ-9  05/15/2025    BMP  11/08/2025    LIPID  11/08/2025    FALL RISK ASSESSMENT  11/15/2025    GLUCOSE  11/08/2027    ADVANCE CARE PLANNING  10/18/2028    COLORECTAL CANCER SCREENING  10/30/2029    HEPATITIS C SCREENING  Completed    Pneumococcal Vaccine: 65+ Years  Completed    ZOSTER IMMUNIZATION  Completed    HPV IMMUNIZATION  Aged Out    MENINGITIS IMMUNIZATION  Aged Out    RSV MONOCLONAL ANTIBODY  Aged Out         Review of Systems  Constitutional, HEENT, cardiovascular, pulmonary, gi and gu systems are negative, except as otherwise noted.     Objective    Exam  /67   Pulse 65   Resp 18   Ht 1.702 m (5' 7\")   Wt 65.8 kg (145 lb)   SpO2 98%   BMI 22.71 kg/m     Estimated body mass index is 22.71 kg/m  as calculated from the following:    Height as of this encounter: 1.702 m (5' 7\").    Weight as of this encounter: 65.8 kg (145 lb).    Physical Exam  GENERAL: alert and no distress  NECK: Asymmetric postsurgical changes, left-sided cervical firmness without any appreciable lymphadenopathy (no significant change from previous examinations)  RESP: lungs clear to auscultation - no rales, rhonchi or wheezes  CV: regular rate and rhythm, normal S1 S2, no S3 or S4, no murmur, click or rub, no peripheral edema  ABDOMEN: soft, nontender, no hepatosplenomegaly, no masses and bowel sounds normal  MS: no gross musculoskeletal defects noted, no edema        11/15/2024   Mini Cog   Clock Draw Score 2 Normal   3 Item Recall 3 objects recalled   Mini Cog Total Score 5                 Signed Electronically by: Danyel Cortez MD    Answers submitted by the patient for this visit:  Patient Health Questionnaire (Submitted on 11/15/2024)  If you checked off any problems, how difficult have these problems made it for you to do your work, take care of things at home, or get along with other people?: Not difficult at all  PHQ9 " TOTAL SCORE: 1

## 2024-12-05 ENCOUNTER — OFFICE VISIT (OUTPATIENT)
Dept: FAMILY MEDICINE | Facility: CLINIC | Age: 74
End: 2024-12-05
Payer: COMMERCIAL

## 2024-12-05 ENCOUNTER — NURSE TRIAGE (OUTPATIENT)
Dept: FAMILY MEDICINE | Facility: CLINIC | Age: 74
End: 2024-12-05

## 2024-12-05 VITALS
TEMPERATURE: 98.4 F | BODY MASS INDEX: 22.76 KG/M2 | DIASTOLIC BLOOD PRESSURE: 73 MMHG | HEIGHT: 67 IN | OXYGEN SATURATION: 98 % | SYSTOLIC BLOOD PRESSURE: 157 MMHG | WEIGHT: 145 LBS | HEART RATE: 59 BPM | RESPIRATION RATE: 14 BRPM

## 2024-12-05 DIAGNOSIS — G25.2 RESTING TREMOR: ICD-10-CM

## 2024-12-05 DIAGNOSIS — E89.0 POSTOPERATIVE HYPOTHYROIDISM: Primary | ICD-10-CM

## 2024-12-05 NOTE — TELEPHONE ENCOUNTER
S-(situation): Patient calling with concerns of left arm/ hand     B-(background): Patient has hx of stroke, this past March. Patient also has multiple sclerosis, which he states is well controled.     A-(assessment): For the past 4 months or longer, patient feels that he has involuntary movement of left hand and fingers. He can hold his arms out and left arm does not go straight, feels like his fingers twitch when they are resting on something. Denies any left sided weakness, no numbness, tingling, pain, or loss of sensation. No confusion, difficulty with speech, and no other symptoms other than the involuntary movements of left arm and hand. He is able to use left arm and hand, able to grasp and pick things up.     R-(recommendations): Patient is only willing to see Dr. Cortez. RN gave patient appointment today in clinic with Dr. Cortez at 4:30 pm. Patient states that he does have another appointment that he will try to move around but was wanting to schedule with Dr. Cortez and will call to cancel if he is unable to make it. RN encouraged patient to reschedule with another provider if unable to make it into clinic today. Patient verbalizes understanding and agrees with plan.   Discussed red flag symptoms that warrant ED evaluation.     Reason for Disposition   Weakness of arm or leg is a chronic symptom (recurrent or ongoing problem lasting > 4 weeks)    Additional Information   Negative: SEVERE weakness (e.g., unable to walk or barely able to walk, requires support) and new-onset or getting worse   Negative: Difficult to awaken or acting confused (e.g., disoriented, slurred speech)   Negative: Sudden onset of weakness of the face, arm or leg on one side of the body and present now (Exception: Bell's palsy suspected: weakness on one side of the face developing over hours to days, with no other symptoms.)   Negative: Sudden onset of numbness of the face, arm or leg on one side of the body and present now    Negative: Sudden onset of loss of speech or garbled speech and present now   Negative: Sounds like a life-threatening emergency to the triager   Negative: Confusion, disorientation, or hallucinations is main symptom   Negative: Dizziness is main symptom   Negative: Followed a head injury within last 3 days   Negative: Headache (with neurologic deficit)   Negative: Unable to urinate (or only a few drops) and bladder feels very full   Negative: Loss of bladder or bowel control (urine or bowel incontinence; wetting self, leaking stool) of new-onset   Negative: Back pain with numbness (loss of sensation) in groin or rectal area   Negative: Neurologic deficit that was brief (now gone), ANY of the following: * Weakness of the face, arm, or leg on one side of the body * Numbness of the face, arm, or leg on one side of the body * Loss of speech or garbled speech   Negative: Patient sounds very sick or weak to the triager   Negative: Neurologic deficit of gradual onset (e.g., days to weeks), ANY of the following: * Weakness of the face, arm, or leg on one side of the body* Numbness of the face, arm, or leg on one side of the body* Loss of speech or garbled speech   Negative: [place] palsy suspected (i.e., weakness only one side of the face, developing over hours to days, no other symptoms)   Negative: Tingling (e.g., pins and needles) of the face, arm or leg on one side of the body, that is present now  (Exceptions: Chronic or recurrent symptom lasting > 4 weeks; or from known cause, such as: bumped elbow, carpal tunnel, pinched nerve.)   Negative: Neck pain (with neurologic deficit)   Negative: Back pain (with neurologic deficit)   Negative: Patient wants to be seen   Negative: Loss of speech or garbled speech is a chronic symptom (recurrent or ongoing problem lasting > 4 weeks)    Protocols used: Neurologic Deficit-A-[place]

## 2024-12-05 NOTE — PROGRESS NOTES
Assessment & Plan   Problem List Items Addressed This Visit          Endocrine    Postoperative hypothyroidism - Primary       Other    Resting tremor     12/2024: Observation for approximately a year of resting tremor in the left hand.  No involvement on right side  -Symptom observation preceded stroke later in the year (presenting left-sided hemiparesis)  -Symptom features do not seem to be consistent with seizure activity  -No other essential tremor features  -Reassurance given to him.  No other features to suggest underlying Parkinson's disease.  Counseled on potential future signs and symptoms of underlying movement disorder/Parkinson's disease.  Encouraged him to discuss with his [place] neurologist at next appointment in the spring                      Subjective   Jarrod is a 74 year old, presenting for the following health issues: Returns to clinic for concerns about resting tremor observed in left hand.  He feels that symptoms predated his stroke presentation the spring.  He states his girlfriend primarily describes a resting tremor in left hand.  Describes some degree of clumsiness in his hand.  Feels like he will reach out for an object but have to redirect his hand.  Denies any speech changes.  No bradykinesia.  No changes in gait.  Primarily brought up concerns due to him leaving for [place] in the near future and his girlfriend encouraged him to address the topic.  Specifically his biggest concerns were if features could be consistent with Parkinson's onset  Multiple Sclerosis        12/5/2024     4:19 PM   Additional Questions   Roomed by Mai LUONG     History of Present Illness       Reason for visit:  Shaky left arm & hand  Symptom onset:  More than a month  Symptoms include:  At times, shaky left hand  Symptom intensity:  Mild  Symptom progression:  Staying the same  Had these symptoms before:  Yes  Has tried/received treatment for these symptoms:  No    He eats 0-1 servings of fruits and  "vegetables daily.He consumes 0 sweetened beverage(s) daily.He exercises with enough effort to increase his heart rate 9 or less minutes per day.  He exercises with enough effort to increase his heart rate 3 or less days per week.   He is taking medications regularly.           Objective    BP (!) 157/73   Pulse 59   Temp 98.4  F (36.9  C)   Resp 14   Ht 1.702 m (5' 7\")   Wt 65.8 kg (145 lb)   SpO2 98%   BMI 22.71 kg/m    Body mass index is 22.71 kg/m .  Physical Exam   GENERAL: alert and no distress  NECK: no adenopathy, no asymmetry, masses, or scars  RESP: lungs clear to auscultation - no rales, rhonchi or wheezes  CV: regular rate and rhythm, normal S1 S2, no S3 or S4, no murmur, click or rub, no peripheral edema  MS: no gross musculoskeletal defects noted, no edema  Neuro: Observed, mild resting tremor in left hand.  No past-pointing; absent asterixis.  5/5, symmetric motor strength observed in upper extremities.  No cogwheeling.            Signed Electronically by: Danyel Cortez MD    "

## 2024-12-06 NOTE — ASSESSMENT & PLAN NOTE
12/2024: Observation for approximately a year of resting tremor in the left hand.  No involvement on right side  -Symptom observation preceded stroke later in the year (presenting left-sided hemiparesis)  -Symptom features do not seem to be consistent with seizure activity  -No other essential tremor features  -Reassurance given to him.  No other features to suggest underlying Parkinson's disease.  Counseled on potential future signs and symptoms of underlying movement disorder/Parkinson's disease.  Encouraged him to discuss with his [place] neurologist at next appointment in the spring

## 2025-01-29 ENCOUNTER — TELEPHONE (OUTPATIENT)
Dept: CARDIOLOGY | Facility: CLINIC | Age: 75
End: 2025-01-29
Payer: COMMERCIAL

## 2025-01-29 NOTE — TELEPHONE ENCOUNTER
Pt called in- he filled Repatha at [facility] in [place] but was informed a PA would be needed for future refills. He will be returning to [place] in 3 months.-mechelle

## 2025-01-30 NOTE — TELEPHONE ENCOUNTER
Prior Authorization Approval    Authorization Effective Date: 1/30/2025  Authorization Expiration Date: 12/31/2025  Medication: Repatha 140mg/ml  Approved Dose/Quantity:   Reference #:     Insurance Company: [employer] - Phone [phone] Fax [phone]  Expected CoPay:       CoPay Card Available:      Foundation Assistance Needed:    Which Pharmacy is filling the prescription (Not needed for infusion/clinic administered[facility] [place] [facility] [address]

## 2025-01-30 NOTE — TELEPHONE ENCOUNTER
Central Prior Authorization Team   Phone: [phone]    PA Initiation    Medication:   Insurance Company: [employer] - Phone [phone] Fax [phone]  Pharmacy Filling the Rx: [facility] - [place], [place] - [facility] [address]  Filling Pharmacy Phone: [phone]  Filling Pharmacy Fax:    Start Date: 1/30/2025

## 2025-03-01 NOTE — PLAN OF CARE
Problem: Adult Inpatient Plan of Care  Goal: Plan of Care Review  Outcome: Adequate for Discharge  Goal: Patient-Specific Goal (Individualized)  Outcome: Adequate for Discharge  Goal: Absence of Hospital-Acquired Illness or Injury  Outcome: Adequate for Discharge  Intervention: Prevent Skin Injury  Recent Flowsheet Documentation  Taken 9/9/2021 1100 by Myesha Toure RN  Body Position: supine  Goal: Optimal Comfort and Wellbeing  Outcome: Adequate for Discharge  Goal: Readiness for Transition of Care  Outcome: Adequate for Discharge       Discharged ambulatory  
Removed 3 ml of air at 1255. Removed 2 ml of air at 1310, had slight oozing. Replaced the full 5 ml of air into the TR band. No further oozing. Will wait 1/2 hour, and remove air at or after 1340.  
pt will perform supine to sit SBA

## 2025-04-14 ENCOUNTER — TELEPHONE (OUTPATIENT)
Dept: CARDIOLOGY | Facility: CLINIC | Age: 75
End: 2025-04-14
Payer: COMMERCIAL

## 2025-04-18 ENCOUNTER — TELEPHONE (OUTPATIENT)
Dept: FAMILY MEDICINE | Facility: CLINIC | Age: 75
End: 2025-04-18
Payer: COMMERCIAL

## 2025-04-21 ENCOUNTER — LAB (OUTPATIENT)
Dept: LAB | Facility: CLINIC | Age: 75
End: 2025-04-21
Payer: COMMERCIAL

## 2025-04-21 DIAGNOSIS — E89.0 POSTOPERATIVE HYPOTHYROIDISM: Primary | ICD-10-CM

## 2025-04-21 DIAGNOSIS — E78.00 PURE HYPERCHOLESTEROLEMIA: ICD-10-CM

## 2025-04-21 DIAGNOSIS — I25.10 CORONARY ARTERY DISEASE INVOLVING NATIVE CORONARY ARTERY OF NATIVE HEART WITHOUT ANGINA PECTORIS: ICD-10-CM

## 2025-04-21 DIAGNOSIS — I63.9 STROKE (CEREBRUM) (H): ICD-10-CM

## 2025-04-21 LAB
CHOLEST SERPL-MCNC: 111 MG/DL
FASTING STATUS PATIENT QL REPORTED: NORMAL
HDLC SERPL-MCNC: 59 MG/DL
LDLC SERPL CALC-MCNC: 35 MG/DL
NONHDLC SERPL-MCNC: 52 MG/DL
T4 FREE SERPL-MCNC: 1.41 NG/DL (ref 0.9–1.7)
TRIGL SERPL-MCNC: 83 MG/DL
TSH SERPL DL<=0.005 MIU/L-ACNC: 5.8 UIU/ML (ref 0.3–4.2)

## 2025-04-21 PROCEDURE — 84439 ASSAY OF FREE THYROXINE: CPT | Performed by: INTERNAL MEDICINE

## 2025-04-21 PROCEDURE — 82465 ASSAY BLD/SERUM CHOLESTEROL: CPT | Performed by: INTERNAL MEDICINE

## 2025-04-21 PROCEDURE — 84443 ASSAY THYROID STIM HORMONE: CPT | Performed by: INTERNAL MEDICINE

## 2025-04-23 ENCOUNTER — ANCILLARY PROCEDURE (OUTPATIENT)
Dept: CARDIOLOGY | Facility: CLINIC | Age: 75
End: 2025-04-23
Attending: INTERNAL MEDICINE
Payer: COMMERCIAL

## 2025-04-23 ENCOUNTER — OFFICE VISIT (OUTPATIENT)
Dept: CARDIOLOGY | Facility: CLINIC | Age: 75
End: 2025-04-23
Payer: COMMERCIAL

## 2025-04-23 VITALS
HEART RATE: 60 BPM | HEIGHT: 67 IN | DIASTOLIC BLOOD PRESSURE: 72 MMHG | SYSTOLIC BLOOD PRESSURE: 139 MMHG | RESPIRATION RATE: 17 BRPM | WEIGHT: 144 LBS | BODY MASS INDEX: 22.6 KG/M2

## 2025-04-23 DIAGNOSIS — R01.1 HEART MURMUR: ICD-10-CM

## 2025-04-23 DIAGNOSIS — R01.1 HEART MURMUR: Primary | ICD-10-CM

## 2025-04-23 DIAGNOSIS — I65.23 BILATERAL CAROTID ARTERY STENOSIS: ICD-10-CM

## 2025-04-23 LAB — LVEF ECHO: NORMAL

## 2025-04-23 PROCEDURE — 3075F SYST BP GE 130 - 139MM HG: CPT | Performed by: INTERNAL MEDICINE

## 2025-04-23 PROCEDURE — 99214 OFFICE O/P EST MOD 30 MIN: CPT | Performed by: INTERNAL MEDICINE

## 2025-04-23 PROCEDURE — 3078F DIAST BP <80 MM HG: CPT | Performed by: INTERNAL MEDICINE

## 2025-04-23 PROCEDURE — G2211 COMPLEX E/M VISIT ADD ON: HCPCS | Performed by: INTERNAL MEDICINE

## 2025-04-23 PROCEDURE — 93306 TTE W/DOPPLER COMPLETE: CPT | Performed by: INTERNAL MEDICINE

## 2025-04-23 RX ORDER — ZINC GLUCONATE 50 MG
50 TABLET ORAL PRN
COMMUNITY

## 2025-04-23 NOTE — PROGRESS NOTES
[facility]  [phone]          Assessment/Recommendations   Patient with known coronary artery disease, known carotid artery disease who is stable at this time but has been losing weight for unexplained reasons.  He works out regularly and feels like he is losing muscle mass as well.  No evidence of angina.  His LDL cholesterol is excellent, he takes an antiplatelet agent and his blood pressure is at goal.    He does have a new systolic murmur which I suspect is mitral insufficiency and we will get an echocardiogram.  Would plan on a repeat carotid ultrasound in October of this year.  Will get back to him with the results of both of these studies.    I have asked him to check in with his primary care clinic as he is interested in some dietary coaching/consultation regarding protein, weight loss, and muscle mass.  Would also asked that he discuss weight loss with his primary care physician in case any other evaluation would be warranted.    It has been my honor to care for Jarrod Mancera.    The longitudinal plan of care for the diagnosis(es)/condition(s) as documented were addressed during this visit. Due to the added complexity in care, I will continue to support Jarrod in the subsequent management and with ongoing continuity of care.        History of Present Illness/Subjective    Mr. Jarrod Mancera is a 74 year old male with known coronary artery disease and carotid artery disease.  He has had a good winter and has been exercising on a regular basis.  He denies chest discomfort, palpitations, orthopnea, paroxysmal nocturnal dyspnea, peripheral edema, syncope or near syncopal episodes.  He is concerned about some weight loss and muscle mass loss.  He still lifts weights and exercises every day.  He is not sure why he is lost weight.         Physical Examination Review of Systems   /72 (BP Location: Left arm, Patient Position: Sitting, Cuff Size: Adult Regular)   Pulse 60   Resp 17    "Ht 1.702 m (5' 7\")   Wt 65.3 kg (144 lb)   BMI 22.55 kg/m    Body mass index is 22.55 kg/m .  Wt Readings from Last 3 Encounters:   04/23/25 65.3 kg (144 lb)   12/05/24 65.8 kg (145 lb)   11/15/24 65.8 kg (145 lb)     General Appearance:   Alert, cooperative and in no acute distress.   ENT/Mouth: Pink/moist oral mucosa   EYES:  no scleral icterus, normal conjunctivae   Neck: JVP normal. No Hepatojugular reflux. Thyroid not visualized.   Chest/Lungs:   Lungs are clear to auscultation, equal chest wall expansion.   Cardiovascular:   S1, S2 with 2 or 6 systolic murmur , no clicks or rubs. Brachial, radial and posterior tibial pulses are intact and symetric. No carotid bruits noted   Abdomen:  Nontender. B   Extremities: No cyanosis, clubbing or edema   Skin: no xanthelasma, warm.    Neurologic: normal arm movement bilateral, no tremors     Psychiatric: Appropriate affect.      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;    Cerebral infarction (H)     Depressive disorder     Family history of myocardial infarction     High cholesterol     Hypertension     Hypertension     MS (multiple sclerosis) (H)     Multiple sclerosis (H)     Past Surgical History:   Procedure Laterality Date    ARTHROSCOPY SHOULDER  01/01/1990    L Shoulder    COLONOSCOPY      2/2008; 3/25/2009; 10/30/2019--normal, no further screenings needed d/t adv age 77 when due    CV CORONARY ANGIOGRAM N/A 09/09/2021    Procedure: Coronary Angiogram;  Surgeon: Placido Ang MD;  Location: [facility] CV    CV FRACTIONAL FLOW RATIO WIRE N/A 09/09/2021    Procedure: Fractional Flow Ratio Wire;  Surgeon: Placido Ang MD;  Location: [facility] CV    CV " LEFT VENTRICULOGRAM N/A 2021    Procedure: Left Ventriculogram;  Surgeon: Placido Ang MD;  Location: [facility] LAB CV    FLEXIBLE SIGMOIDOSCOPY  2004    HEAD & NECK SURGERY      Robotic Surgery @ [place]     HERNIA REPAIR  2006    KNEE SURGERY  1970    KNEE SURGERY Left 1970    LAPAROSCOPY  2005    POLYSOMNOGRAPHY  2018    SHOULDER SURGERY  1990    STRESS TEST      2007,2010    TONSILLECTOMY & ADENOIDECTOMY      no date    Family History   Problem Relation Age of Onset    Acute Myocardial Infarction Mother          at age: 70 years    Hypertension Mother     Heart Disease Mother     Acute Myocardial Infarction Father          at age: 52 years Cause of death: heart attack    Heart Disease Father     Myocardial Infarction Father     Coronary Artery Disease Father         Mother & Father    Hypertension Father         Mother & Father    CABG Sister     No Known Problems Sister         age: 66 years    Ovarian Cancer Sister         +angioplaty=51years   at age: 60 years    Heart Disease Sister     Heart Disease Sister         ASHD, + Angioplasty=54 years  age: 76 years    Breast Cancer Other     Migraines Other     Multiple Sclerosis Other     Cancer No family hx of         no skin cancer    Social History     Socioeconomic History    Marital status:      Spouse name: Not on file    Number of children: Not on file    Years of education: Not on file    Highest education level: Not on file   Occupational History    Occupation: Retired    Tobacco Use    Smoking status: Former     Current packs/day: 0.00     Average packs/day: 0.5 packs/day for 8.6 years (4.3 ttl pk-yrs)     Types: Cigarettes     Start date: 1969     Quit date: 1977     Years since quittin.7     Passive exposure: Past    Smokeless tobacco: Former     Quit date: 7/15/1975   Vaping Use    Vaping status: Never Used   Substance and Sexual  Activity    Alcohol use: Yes     Comment: 1 beer almost daily    Drug use: Yes     Types: Marijuana     Comment: on/off    Sexual activity: Yes     Partners: Female     Birth control/protection: Condom   Other Topics Concern    Parent/sibling w/ CABG, MI or angioplasty before 65F 55M? Yes   Social History Narrative    Not on file     Social Drivers of Health     Financial Resource Strain: Low Risk  (11/10/2024)    Financial Resource Strain     Within the past 12 months, have you or your family members you live with been unable to get utilities (heat, electricity) when it was really needed?: No   Food Insecurity: No Food Insecurity (11/22/2024)    Received from [facility]    Hunger Vital Sign     Worried About Running Out of Food in the Last Year: Never true     Ran Out of Food in the Last Year: Never true   Transportation Needs: No Transportation Needs (11/22/2024)    Received from [facility]    PRAPARE - Transportation     Lack of Transportation (Medical): No     Lack of Transportation (Non-Medical): No   Physical Activity: Sufficiently Active (11/22/2024)    Received from [facility]    Exercise Vital Sign     Days of Exercise per Week: 6 days     Minutes of Exercise per Session: 40 min   Stress: No Stress Concern Present (11/10/2024)    [employer] - Occupational Stress Questionnaire     Feeling of Stress : Only a little   Social Connections: Unknown (11/10/2024)    Social Connection and Isolation Panel [NHANES]     Frequency of Communication with Friends and Family: Not on file     Frequency of Social Gatherings with Friends and Family: Three times a week     Attends Denominational Services: Not on file     Active Member of Clubs or Organizations: Not on file     Attends Club or Organization Meetings: Not on file     Marital Status: Not on file   Interpersonal Safety: Low Risk  (11/15/2024)    Interpersonal Safety     Do you feel physically and emotionally safe where you currently  live?: Yes     Within the past 12 months, have you been hit, slapped, kicked or otherwise physically hurt by someone?: No     Within the past 12 months, have you been humiliated or emotionally abused in other ways by your partner or ex-partner?: No   Housing Stability: Low Risk  (11/22/2024)    Received from [facility]    Housing Stability     What is your living situation today?: I have a steady place to live          Medications  Allergies   Current Outpatient Medications   Medication Sig Dispense Refill    amLODIPine (NORVASC) 10 MG tablet Take 1 tablet (10 mg) by mouth daily. 90 tablet 3    atorvastatin (LIPITOR) 80 MG tablet Take 1 tablet (80 mg) by mouth daily. 90 tablet 3    baclofen (LIORESAL) 10 MG tablet Take 2 tablets (20 mg) by mouth 3 times daily. 540 tablet 3    cholecalciferol (VITAMIN-D) 1000 UNIT capsule Take 3 capsules by mouth daily 90 capsule 3    clopidogrel (PLAVIX) 75 MG tablet TAKE ONE TABLET (75 MG) BY MOUTH DAILY 90 tablet 3    diphenhydrAMINE-acetaminophen (TYLENOL PM)  MG tablet Take 1 tablet by mouth nightly as needed for sleep      evolocumab (REPATHA SURECLICK) 140 MG/ML prefilled autoinjector Inject 1 mL (140 mg) subcutaneously every 14 days 6 mL 3    gabapentin (NEURONTIN) 300 MG capsule Take 3 capsules (900 mg) by mouth 3 times daily. 810 capsule 3    levothyroxine (SYNTHROID/LEVOTHROID) 112 MCG tablet Take 1 tablet (112 mcg) by mouth daily. 90 tablet 3    losartan (COZAAR) 50 MG tablet TAKE 1 AND 1/2 TABLETS (75 MG) BY MOUTH DAILY 135 tablet 3    Multiple Vitamin (DAILY-VITAMIN) TABS Take 1 tablet by mouth daily      tadalafil (CIALIS) 5 MG tablet Take 1-2 tabs every 48 hrs as needed for sexual activity 30 tablet 3    zinc gluconate 50 MG tablet Take 50 mg by mouth as needed (TAKES WHEN FEELING A COLD IS COMING ON).      Allergies   Allergen Reactions    Codeine Sulfate Rash         Lab Results    Chemistry/lipid CBC Cardiac Enzymes/BNP/TSH/INR   Lab Results   Component  Value Date    CHOL 111 04/21/2025    HDL 59 04/21/2025    TRIG 83 04/21/2025    BUN 23.7 (H) 11/08/2024     11/08/2024    CO2 27 11/08/2024    Lab Results   Component Value Date    WBC 5.0 11/08/2024    HGB 13.1 (L) 11/08/2024    HCT 40.8 11/08/2024    MCV 99 11/08/2024     11/08/2024    Lab Results   Component Value Date    TSH 5.80 (H) 04/21/2025

## 2025-04-23 NOTE — LETTER
4/23/2025    Danyel Cortez MD  [address] [address]    RE: Jarrod Mancera       Dear Colleague,     I had the pleasure of seeing Jarrod Mancera in the [facility].      [facility]  [phone]          Assessment/Recommendations   Patient with known coronary artery disease, known carotid artery disease who is stable at this time but has been losing weight for unexplained reasons.  He works out regularly and feels like he is losing muscle mass as well.  No evidence of angina.  His LDL cholesterol is excellent, he takes an antiplatelet agent and his blood pressure is at goal.    He does have a new systolic murmur which I suspect is mitral insufficiency and we will get an echocardiogram.  Would plan on a repeat carotid ultrasound in October of this year.  Will get back to him with the results of both of these studies.    I have asked him to check in with his primary care clinic as he is interested in some dietary coaching/consultation regarding protein, weight loss, and muscle mass.  Would also asked that he discuss weight loss with his primary care physician in case any other evaluation would be warranted.    It has been my honor to care for Jarrod Mancera.    The longitudinal plan of care for the diagnosis(es)/condition(s) as documented were addressed during this visit. Due to the added complexity in care, I will continue to support Jarrod in the subsequent management and with ongoing continuity of care.        History of Present Illness/Subjective    Mr. Jarrod Mancera is a 74 year old male with known coronary artery disease and carotid artery disease.  He has had a good winter and has been exercising on a regular basis.  He denies chest discomfort, palpitations, orthopnea, paroxysmal nocturnal dyspnea, peripheral edema, syncope or near syncopal episodes.  He is concerned about some weight loss and muscle mass loss.  He still lifts weights and exercises every day.  He  "is not sure why he is lost weight.         Physical Examination Review of Systems   /72 (BP Location: Left arm, Patient Position: Sitting, Cuff Size: Adult Regular)   Pulse 60   Resp 17   Ht 1.702 m (5' 7\")   Wt 65.3 kg (144 lb)   BMI 22.55 kg/m    Body mass index is 22.55 kg/m .  Wt Readings from Last 3 Encounters:   04/23/25 65.3 kg (144 lb)   12/05/24 65.8 kg (145 lb)   11/15/24 65.8 kg (145 lb)     General Appearance:   Alert, cooperative and in no acute distress.   ENT/Mouth: Pink/moist oral mucosa   EYES:  no scleral icterus, normal conjunctivae   Neck: JVP normal. No Hepatojugular reflux. Thyroid not visualized.   Chest/Lungs:   Lungs are clear to auscultation, equal chest wall expansion.   Cardiovascular:   S1, S2 with 2 or 6 systolic murmur , no clicks or rubs. Brachial, radial and posterior tibial pulses are intact and symetric. No carotid bruits noted   Abdomen:  Nontender. B   Extremities: No cyanosis, clubbing or edema   Skin: no xanthelasma, warm.    Neurologic: normal arm movement bilateral, no tremors     Psychiatric: Appropriate affect.      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;     Cerebral infarction (H)      Depressive disorder      Family history of myocardial infarction      High cholesterol      Hypertension      Hypertension      MS (multiple sclerosis) (H)      Multiple sclerosis (H)     Past Surgical History:   Procedure Laterality Date     ARTHROSCOPY SHOULDER  01/01/1990    L Shoulder     COLONOSCOPY      2/2008; 3/25/2009; 10/30/2019--normal, no further screenings needed d/t adv age 77 when due     CV CORONARY ANGIOGRAM N/A 09/09/2021    Procedure: Coronary Angiogram;  Surgeon: Placido Ang " MD El;  Location: [facility] CATH LAB CV     CV FRACTIONAL FLOW RATIO WIRE N/A 2021    Procedure: Fractional Flow Ratio Wire;  Surgeon: Placido Ang MD;  Location: [facility] CATH LAB CV     CV LEFT VENTRICULOGRAM N/A 2021    Procedure: Left Ventriculogram;  Surgeon: Placido Ang MD;  Location: [facility] CATH LAB CV     FLEXIBLE SIGMOIDOSCOPY  2004     HEAD & NECK SURGERY      Robotic Surgery @ [place]      HERNIA REPAIR  2006     KNEE SURGERY  1970     KNEE SURGERY Left 1970     LAPAROSCOPY  2005     POLYSOMNOGRAPHY  2018     SHOULDER SURGERY       STRESS TEST      2007,2010     TONSILLECTOMY & ADENOIDECTOMY      no date    Family History   Problem Relation Age of Onset     Acute Myocardial Infarction Mother          at age: 70 years     Hypertension Mother      Heart Disease Mother      Acute Myocardial Infarction Father          at age: 52 years Cause of death: heart attack     Heart Disease Father      Myocardial Infarction Father      Coronary Artery Disease Father         Mother & Father     Hypertension Father         Mother & Father     CABG Sister      No Known Problems Sister         age: 66 years     Ovarian Cancer Sister         +angioplaty=51years   at age: 60 years     Heart Disease Sister      Heart Disease Sister         ASHD, + Angioplasty=54 years  age: 76 years     Breast Cancer Other      Migraines Other      Multiple Sclerosis Other      Cancer No family hx of         no skin cancer    Social History     Socioeconomic History     Marital status:      Spouse name: Not on file     Number of children: Not on file     Years of education: Not on file     Highest education level: Not on file   Occupational History     Occupation: Retired    Tobacco Use     Smoking status: Former     Current packs/day: 0.00     Average packs/day: 0.5 packs/day for 8.6 years (4.3 ttl pk-yrs)      Types: Cigarettes     Start date: 1969     Quit date: 1977     Years since quittin.7     Passive exposure: Past     Smokeless tobacco: Former     Quit date: 7/15/1975   Vaping Use     Vaping status: Never Used   Substance and Sexual Activity     Alcohol use: Yes     Comment: 1 beer almost daily     Drug use: Yes     Types: Marijuana     Comment: on/off     Sexual activity: Yes     Partners: Female     Birth control/protection: Condom   Other Topics Concern     Parent/sibling w/ CABG, MI or angioplasty before 65F 55M? Yes   Social History Narrative     Not on file     Social Drivers of Health     Financial Resource Strain: Low Risk  (11/10/2024)    Financial Resource Strain      Within the past 12 months, have you or your family members you live with been unable to get utilities (heat, electricity) when it was really needed?: No   Food Insecurity: No Food Insecurity (2024)    Received from [facility]    Hunger Vital Sign      Worried About Running Out of Food in the Last Year: Never true      Ran Out of Food in the Last Year: Never true   Transportation Needs: No Transportation Needs (2024)    Received from [facility]    PRAPARE - Transportation      Lack of Transportation (Medical): No      Lack of Transportation (Non-Medical): No   Physical Activity: Sufficiently Active (2024)    Received from [facility]    Exercise Vital Sign      Days of Exercise per Week: 6 days      Minutes of Exercise per Session: 40 min   Stress: No Stress Concern Present (11/10/2024)    [employer] [employer] - Occupational Stress Questionnaire      Feeling of Stress : Only a little   Social Connections: Unknown (11/10/2024)    Social Connection and Isolation Panel [NHANES]      Frequency of Communication with Friends and Family: Not on file      Frequency of Social Gatherings with Friends and Family: Three times a week      Attends Yazidism Services: Not on file      Active Member of  Clubs or Organizations: Not on file      Attends Club or Organization Meetings: Not on file      Marital Status: Not on file   Interpersonal Safety: Low Risk  (11/15/2024)    Interpersonal Safety      Do you feel physically and emotionally safe where you currently live?: Yes      Within the past 12 months, have you been hit, slapped, kicked or otherwise physically hurt by someone?: No      Within the past 12 months, have you been humiliated or emotionally abused in other ways by your partner or ex-partner?: No   Housing Stability: Low Risk  (11/22/2024)    [facility]    Housing Stability      What is your living situation today?: I have a steady place to live          Medications  Allergies   Current Outpatient Medications   Medication Sig Dispense Refill     amLODIPine (NORVASC) 10 MG tablet Take 1 tablet (10 mg) by mouth daily. 90 tablet 3     atorvastatin (LIPITOR) 80 MG tablet Take 1 tablet (80 mg) by mouth daily. 90 tablet 3     baclofen (LIORESAL) 10 MG tablet Take 2 tablets (20 mg) by mouth 3 times daily. 540 tablet 3     cholecalciferol (VITAMIN-D) 1000 UNIT capsule Take 3 capsules by mouth daily 90 capsule 3     clopidogrel (PLAVIX) 75 MG tablet TAKE ONE TABLET (75 MG) BY MOUTH DAILY 90 tablet 3     diphenhydrAMINE-acetaminophen (TYLENOL PM)  MG tablet Take 1 tablet by mouth nightly as needed for sleep       evolocumab (REPATHA SURECLICK) 140 MG/ML prefilled autoinjector Inject 1 mL (140 mg) subcutaneously every 14 days 6 mL 3     gabapentin (NEURONTIN) 300 MG capsule Take 3 capsules (900 mg) by mouth 3 times daily. 810 capsule 3     levothyroxine (SYNTHROID/LEVOTHROID) 112 MCG tablet Take 1 tablet (112 mcg) by mouth daily. 90 tablet 3     losartan (COZAAR) 50 MG tablet TAKE 1 AND 1/2 TABLETS (75 MG) BY MOUTH DAILY 135 tablet 3     Multiple Vitamin (DAILY-VITAMIN) TABS Take 1 tablet by mouth daily       tadalafil (CIALIS) 5 MG tablet Take 1-2 tabs every 48 hrs as needed for sexual  activity 30 tablet 3     zinc gluconate 50 MG tablet Take 50 mg by mouth as needed (TAKES WHEN FEELING A COLD IS COMING ON).      Allergies   Allergen Reactions     Codeine Sulfate Rash         Lab Results    Chemistry/lipid CBC Cardiac Enzymes/BNP/TSH/INR   Lab Results   Component Value Date    CHOL 111 04/21/2025    HDL 59 04/21/2025    TRIG 83 04/21/2025    BUN 23.7 (H) 11/08/2024     11/08/2024    CO2 27 11/08/2024    Lab Results   Component Value Date    WBC 5.0 11/08/2024    HGB 13.1 (L) 11/08/2024    HCT 40.8 11/08/2024    MCV 99 11/08/2024     11/08/2024    Lab Results   Component Value Date    TSH 5.80 (H) 04/21/2025                                                Thank you for allowing me to participate in the care of your patient.      Sincerely,     Tramaine Drummond MD     [facility]  cc:   Tramaine Drummond MD  [address]  [place] [address]

## 2025-07-09 DIAGNOSIS — I63.9 CEREBROVASCULAR ACCIDENT (CVA), UNSPECIFIED MECHANISM (H): ICD-10-CM

## 2025-07-09 RX ORDER — CLOPIDOGREL BISULFATE 75 MG/1
75 TABLET ORAL DAILY
Qty: 90 TABLET | Refills: 3 | Status: SHIPPED | OUTPATIENT
Start: 2025-07-09

## 2025-08-04 DIAGNOSIS — I25.10 CORONARY ARTERY DISEASE INVOLVING NATIVE CORONARY ARTERY OF NATIVE HEART WITHOUT ANGINA PECTORIS: ICD-10-CM

## 2025-08-04 DIAGNOSIS — E78.00 PURE HYPERCHOLESTEROLEMIA: ICD-10-CM

## 2025-08-05 RX ORDER — EVOLOCUMAB 140 MG/ML
INJECTION, SOLUTION SUBCUTANEOUS
Qty: 6 ML | Refills: 1 | Status: SHIPPED | OUTPATIENT
Start: 2025-08-05

## 2025-08-29 ENCOUNTER — TELEPHONE (OUTPATIENT)
Dept: FAMILY MEDICINE | Facility: CLINIC | Age: 75
End: 2025-08-29
Payer: COMMERCIAL

## 2025-08-29 DIAGNOSIS — E89.0 POSTOPERATIVE HYPOTHYROIDISM: Primary | ICD-10-CM

## 2025-09-02 RX ORDER — LEVOTHYROXINE SODIUM 125 UG/1
125 TABLET ORAL
Qty: 90 TABLET | Refills: 3 | Status: SHIPPED | OUTPATIENT
Start: 2025-09-02

## (undated) DEVICE — KIT HAND CONTROL ACIST 014644 AR-P54

## (undated) DEVICE — CATH DIAG 4FR ANG PIG 538453S

## (undated) DEVICE — SYR ANGIOGRAPHY MULTIUSE KIT ACIST 014612

## (undated) DEVICE — ELECTRODE ADULT PACING MULTI P-211-M1

## (undated) DEVICE — CUSTOM PACK CORONARY SAN5BCRHEA

## (undated) DEVICE — GUIDEWIRE VASCULAR 0.035IN DIA 145CML 15CML/6CML STAINLESS

## (undated) DEVICE — MANIFOLD KIT ANGIO AUTOMATED 014613

## (undated) DEVICE — CATH ANGIO INFINITI JR4 4FRX100CM 538421

## (undated) DEVICE — SHEATH EXT 10CM STAND TALL W/ SECURE CLASP ST0010L

## (undated) DEVICE — GUIDEWIRE VASCULAR COMET II 185CML PRESSURE H74939359110

## (undated) DEVICE — CATH ANGIO INFINITI JL4 4FRX100CM 538420

## (undated) DEVICE — SHEATH GLIDE RADIAL 4FR 25CM 0.021

## (undated) DEVICE — SLEEVE TR BAND RADIAL COMPRESSION DEVICE 24CM TRB24-REG

## (undated) RX ORDER — DIAZEPAM 5 MG
TABLET ORAL
Status: DISPENSED
Start: 2021-09-09

## (undated) RX ORDER — ADENOSINE 3 MG/ML
INJECTION, SOLUTION INTRAVENOUS
Status: DISPENSED
Start: 2021-09-09

## (undated) RX ORDER — FENTANYL CITRATE 50 UG/ML
INJECTION, SOLUTION INTRAMUSCULAR; INTRAVENOUS
Status: DISPENSED
Start: 2021-09-09